# Patient Record
Sex: FEMALE | Race: WHITE | NOT HISPANIC OR LATINO | Employment: UNEMPLOYED | URBAN - METROPOLITAN AREA
[De-identification: names, ages, dates, MRNs, and addresses within clinical notes are randomized per-mention and may not be internally consistent; named-entity substitution may affect disease eponyms.]

---

## 2021-08-02 PROBLEM — U07.1 COVID-19 AFFECTING PREGNANCY IN THIRD TRIMESTER: Status: ACTIVE | Noted: 2021-08-02

## 2021-08-02 PROBLEM — O98.513 COVID-19 AFFECTING PREGNANCY IN THIRD TRIMESTER: Status: ACTIVE | Noted: 2021-08-02

## 2021-08-18 PROBLEM — O16.3 HTN COMPLICATING PERIPREGNANCY, ANTEPARTUM, THIRD TRIMESTER: Status: ACTIVE | Noted: 2021-08-18

## 2021-08-20 PROBLEM — O22.10: Status: ACTIVE | Noted: 2021-08-20

## 2021-08-20 PROBLEM — Z87.19 HX OF CROHN'S DISEASE: Status: ACTIVE | Noted: 2021-08-20

## 2021-09-08 ENCOUNTER — PATIENT MESSAGE (OUTPATIENT)
Dept: ADMINISTRATIVE | Facility: OTHER | Age: 21
End: 2021-09-08

## 2022-08-19 ENCOUNTER — TELEPHONE (OUTPATIENT)
Dept: GASTROENTEROLOGY | Facility: CLINIC | Age: 22
End: 2022-08-19
Payer: COMMERCIAL

## 2022-08-22 ENCOUNTER — PATIENT MESSAGE (OUTPATIENT)
Dept: GASTROENTEROLOGY | Facility: CLINIC | Age: 22
End: 2022-08-22
Payer: COMMERCIAL

## 2022-09-01 ENCOUNTER — LAB VISIT (OUTPATIENT)
Dept: LAB | Facility: HOSPITAL | Age: 22
End: 2022-09-01
Attending: INTERNAL MEDICINE
Payer: COMMERCIAL

## 2022-09-01 ENCOUNTER — OFFICE VISIT (OUTPATIENT)
Dept: GASTROENTEROLOGY | Facility: CLINIC | Age: 22
End: 2022-09-01
Payer: COMMERCIAL

## 2022-09-01 VITALS — HEIGHT: 67 IN | BODY MASS INDEX: 31.63 KG/M2 | WEIGHT: 201.5 LBS

## 2022-09-01 DIAGNOSIS — K50.818 CROHN'S DISEASE OF BOTH SMALL AND LARGE INTESTINE WITH OTHER COMPLICATION: Primary | ICD-10-CM

## 2022-09-01 DIAGNOSIS — K50.818 CROHN'S DISEASE OF BOTH SMALL AND LARGE INTESTINE WITH OTHER COMPLICATION: ICD-10-CM

## 2022-09-01 PROCEDURE — 86480 TB TEST CELL IMMUN MEASURE: CPT | Performed by: INTERNAL MEDICINE

## 2022-09-01 PROCEDURE — 99205 PR OFFICE/OUTPT VISIT, NEW, LEVL V, 60-74 MIN: ICD-10-PCS | Mod: S$GLB,,, | Performed by: INTERNAL MEDICINE

## 2022-09-01 PROCEDURE — 99205 OFFICE O/P NEW HI 60 MIN: CPT | Mod: S$GLB,,, | Performed by: INTERNAL MEDICINE

## 2022-09-01 PROCEDURE — 3008F PR BODY MASS INDEX (BMI) DOCUMENTED: ICD-10-PCS | Mod: CPTII,S$GLB,, | Performed by: INTERNAL MEDICINE

## 2022-09-01 PROCEDURE — 3008F BODY MASS INDEX DOCD: CPT | Mod: CPTII,S$GLB,, | Performed by: INTERNAL MEDICINE

## 2022-09-01 PROCEDURE — 99999 PR PBB SHADOW E&M-EST. PATIENT-LVL II: CPT | Mod: PBBFAC,,, | Performed by: INTERNAL MEDICINE

## 2022-09-01 PROCEDURE — 1159F PR MEDICATION LIST DOCUMENTED IN MEDICAL RECORD: ICD-10-PCS | Mod: CPTII,S$GLB,, | Performed by: INTERNAL MEDICINE

## 2022-09-01 PROCEDURE — 1159F MED LIST DOCD IN RCRD: CPT | Mod: CPTII,S$GLB,, | Performed by: INTERNAL MEDICINE

## 2022-09-01 PROCEDURE — 99999 PR PBB SHADOW E&M-EST. PATIENT-LVL II: ICD-10-PCS | Mod: PBBFAC,,, | Performed by: INTERNAL MEDICINE

## 2022-09-01 RX ORDER — USTEKINUMAB 90 MG/ML
90 INJECTION, SOLUTION SUBCUTANEOUS
Qty: 1 ML | Refills: 12 | Status: SHIPPED | OUTPATIENT
Start: 2022-09-01 | End: 2022-09-19 | Stop reason: SDUPTHER

## 2022-09-01 RX ORDER — SERTRALINE HYDROCHLORIDE 25 MG/1
25 TABLET, FILM COATED ORAL DAILY
COMMUNITY
Start: 2022-08-30 | End: 2023-03-23 | Stop reason: ALTCHOICE

## 2022-09-01 RX ORDER — PANTOPRAZOLE SODIUM 40 MG/1
40 TABLET, DELAYED RELEASE ORAL DAILY
Qty: 90 TABLET | Refills: 1 | Status: SHIPPED | OUTPATIENT
Start: 2022-09-01 | End: 2023-03-02

## 2022-09-01 RX ORDER — NORETHINDRONE ACETATE AND ETHINYL ESTRADIOL, AND FERROUS FUMARATE 1MG-20(24)
1 KIT ORAL DAILY PRN
COMMUNITY
Start: 2022-07-08 | End: 2023-03-23

## 2022-09-01 RX ORDER — USTEKINUMAB 90 MG/ML
INJECTION, SOLUTION SUBCUTANEOUS
COMMUNITY
Start: 2022-08-08 | End: 2022-09-01 | Stop reason: SDUPTHER

## 2022-09-01 RX ORDER — OMEPRAZOLE 20 MG/1
20 CAPSULE, DELAYED RELEASE ORAL
COMMUNITY
End: 2023-03-23

## 2022-09-01 RX ORDER — DICYCLOMINE HYDROCHLORIDE 10 MG/1
10 CAPSULE ORAL EVERY 6 HOURS PRN
COMMUNITY
Start: 2022-08-10 | End: 2024-03-28

## 2022-09-01 RX ORDER — ERGOCALCIFEROL (VITAMIN D2) 10 MCG
5000 TABLET ORAL
COMMUNITY
End: 2024-01-09

## 2022-09-01 RX ORDER — ERGOCALCIFEROL 1.25 MG/1
50000 CAPSULE ORAL
Qty: 12 CAPSULE | Refills: 3 | Status: SHIPPED | OUTPATIENT
Start: 2022-09-01 | End: 2022-11-30

## 2022-09-01 RX ORDER — CYANOCOBALAMIN (VITAMIN B-12) 2000 MCG
1 TABLET ORAL
COMMUNITY
Start: 2022-04-22 | End: 2023-04-22

## 2022-09-01 NOTE — PROGRESS NOTES
Chief Complaint: Crohn's disease     HPI: 22 y.o. female h/o upper GI (gastric and duodenal) and ileocolonic Crohn's disease, diagnosed 2013, complicated by ileal and ascending colon stricture, status post ileal resection and right hemicolectomy (May 2020) currently on Stelara 90 mg every 4 week monotherapy who presents as a referral from [facility] to establish care.    Harper Spivey has overall been doing very well from a GI perspective on her current regimen. She typically has regular bowel movements without any mucous in stool or blood in stool. She intermittently has episodes where she has abdominal cramping with diarrhea, nausea and vomiting. This is usually triggered by stress and has been considered to be related to irritable bowel syndrome. The next day symptoms improve. She last had endoscopy     Denies any active extraintestinal manifestations involving eyes, mouth, skin, joints, and perianal area.    She has been adherent with her Stelara 90 mg every 4 weeks     Medical records reviewed. Additional history supplemented by nursing.     Review of Systems:   General ROS: negative for chills, fever, or weight loss  Ophthalmic ROS: negative for blurry vision, photophobia, or eye pain  ENT ROS: negative for oral ulcers, sore throat or rhinorrhea  Respiratory ROS: no cough, shortness of breath, or wheezing  Cardiovascular ROS: no chest pain, palpitations, or dyspnea on exertion  Gastrointestinal ROS: per HPI  Musculoskeletal ROS: no arthralgias, myalgias, joint swelling or erythema  Dermatological ROS: negative for pruritis, rash, ulcers, nodules    Past Medical History:  Crohn's disease-Upper GI (duodenal and gastric Crohn's disease) and ileocolonic Crohn's disease     Past Surgical History: ileal resection and right hemicolectomy (May 2020)     Social History:  Tobacco use: none  NSAIDS use: none  Narcotic use: none  Alcohol use: none  Illicit drug use: none    Family  History:  "  There is no known family history of IBD, CRC, or celiac disease.     Allergies:   No known drug allergies.    Medications: reviewed    Physical Examination:  Ht 5' 7" (1.702 m)   Wt 91.4 kg (201 lb 8 oz)   LMP 08/29/2022   BMI 31.56 kg/m²   General appearance: alert, cooperative, no distress  HENT: Normocephalic, atraumatic, neck symmetrical  Eyes: conjunctivae clear  Lungs: No labored breathing  Skin: No jaundice  Neurologic: Alert and oriented X 3      Most recent Labs/Stool studies:  Lab Results   Component Value Date    WBC 12.96 (H) 08/19/2021    HGB 10.9 (L) 08/19/2021     Lab Results   Component Value Date    CREATININE 0.43 (L) 08/19/2021     Lab Results   Component Value Date    ALT 17 08/19/2021    AST 38 (H) 08/19/2021    ALKPHOS 116 08/19/2021    BILITOT 0.2 08/19/2021     Most recent Endoscopy:   EGD 10/2021 with LA Grade A esophagitis    Colonoscopy:10/2021: Large skin tags on perianal exam, evidence of prior side to side ileocolonic anastomosis in TC which was patent, two small ulcers on anastomotic lines, traversed. Teodoro-TI appeared normal Rutgeerts i0    Assessment and Plan: 22 y.o. female with  1. Upper GI (duodenal and gastric Crohn's disease) and ileocolonic Crohn's disease   2. Complicated by ileal and ascending colon stricture, status post ileal and right hemicolectomy (5/2020)  3. Currently on Stelara 90 mg every 4 weeks monotherapy (dose optimized following elevated fecal calprotectin and CRP)   4. Previously on Remicade and Humira (secondary loss of response)  5. Mucosal healing achieved on Stelara 90 mg every 4 weeks (10/2021)  6. Up to date with Prevnar 13, Pneumovax 23  7. Hepatitis B immune  8. Osteopenia (DEXA 2021)    Harper is doing well from a GI perspective while on current regimen and should continue Stelara 90 mg every 4 weeks.     I have reviewed records from [facility] and will scan in to chart the procedure notes from [facility] as well as [facility].    Will place " order to try to obtain authorization for dosing of Stelara. May require an additional IV dose if able to be approved by insurance given that the fecal calprotectin has been slowly increasing and she is symptomatic. Fcal remains under 200 (181). Will repeat again in 3 months. I discussed that her most recent colonoscopy showed mucosal healing in 10/2021. If Fcal continues to rise we will consider repeat colonoscopy.    Osteopenia noted on last DEXA scan. She will continue calcium and vitamin D supplementation. Started her on high dose vitamin D 50,000 weekly.     Prescribed protonix 40 mg daily due to symptoms of reflux.     She is up to date with most vaccinations except Shingrix. Will update hepatitis and TB serology.    Healthcare Maintenance:  -COVID: refused  Prevnar 13: 2/26/2020  -Pneumovax 23: 8/4/2020  -Flu Shot: 10/25/2020  -Shingrix: recommend  -Tdap: UTD  -Hepatitis B: UTD  -Cervical cancer screening: follows with Dr. Lundberg  -Skin cancer screening: recommend  -Colon cancer screening: dx 2013, last colonoscopy 2021  -TB/Hep B screening: UTD  -Bone density: DEXA w/ osteopenia  -Tobacco: avoids   -Should avoid NSAIDs and narcotics, use only Tylenol for pain relief.     RTC 6 months     60 minutes of total time spent on the encounter, which includes face to face time and non-face to face time preparing to see the patient (eg, review of tests), obtaining and/or reviewing separately obtained history, documenting clinical information in the electronic or other health record, Independently interpreting results (not separately reported) and communicating results to the patient/family/caregiver, or care coordination (not separately reported).            John Conklin MD  [facility]  [address] [place] [address]  Office: [phone]  Fax: [phone]

## 2022-09-03 LAB
GAMMA INTERFERON BACKGROUND BLD IA-ACNC: 0.08 IU/ML
M TB IFN-G CD4+ BCKGRND COR BLD-ACNC: 0 IU/ML
MITOGEN IGNF BCKGRD COR BLD-ACNC: 9.43 IU/ML
TB GOLD PLUS: NEGATIVE
TB2 - NIL: 0.02 IU/ML

## 2022-09-08 ENCOUNTER — SPECIALTY PHARMACY (OUTPATIENT)
Dept: PHARMACY | Facility: CLINIC | Age: 22
End: 2022-09-08
Payer: COMMERCIAL

## 2022-09-08 ENCOUNTER — PATIENT MESSAGE (OUTPATIENT)
Dept: GASTROENTEROLOGY | Facility: CLINIC | Age: 22
End: 2022-09-08
Payer: COMMERCIAL

## 2022-09-08 NOTE — TELEPHONE ENCOUNTER
Incoming call from pt regarding status of Stelara.  Welcome call completed. Explained [facility] services.  Pt states she is due to inject on 9/22.  Will route to team.    Beulah, this is Shannan Salmon with [facility].  We are working on your prescription that your doctor has sent us. We will be working with your insurance to get this approved for you. We will be calling you along the way with updates on your medication.  If you have any questions, you can reach us at [phone].    Welcome call outcome: Patient/caregiver reached

## 2022-09-09 ENCOUNTER — SPECIALTY PHARMACY (OUTPATIENT)
Dept: PHARMACY | Facility: CLINIC | Age: 22
End: 2022-09-09
Payer: COMMERCIAL

## 2022-09-09 ENCOUNTER — PATIENT MESSAGE (OUTPATIENT)
Dept: GASTROENTEROLOGY | Facility: CLINIC | Age: 22
End: 2022-09-09
Payer: COMMERCIAL

## 2022-09-09 DIAGNOSIS — Z87.19 HX OF CROHN'S DISEASE: Primary | ICD-10-CM

## 2022-09-09 NOTE — TELEPHONE ENCOUNTER
Spoke with patient, patient has been on every 4 week dosing for about a year. She got new insurance and therefore needs a new PA. Will finalize PA once chart notes are signed.

## 2022-09-09 NOTE — TELEPHONE ENCOUNTER
Elli Riojas, this is Heidy Lundberg with [facility].  We are working on your prescription that your doctor has sent us. We will be working with your insurance to get this approved for you. We will be calling you along the way with updates on your medication.  If you have any questions, you can reach us at [phone].    Welcome call outcome: Patient/caregiver reached      Spoke with patient, patient has been on every 4 week dosing for about a year. She got new insurance and therefore needs a new PA. Will finalize PA once chart notes are signed.

## 2022-09-10 PROBLEM — K50.818 CROHN'S DISEASE OF BOTH SMALL AND LARGE INTESTINE WITH OTHER COMPLICATION: Status: ACTIVE | Noted: 2022-09-10

## 2022-09-10 RX ORDER — EPINEPHRINE 1 MG/ML
0.3 INJECTION, SOLUTION, CONCENTRATE INTRAVENOUS
Status: CANCELLED | OUTPATIENT
Start: 2022-09-10

## 2022-09-10 RX ORDER — IPRATROPIUM BROMIDE AND ALBUTEROL SULFATE 2.5; .5 MG/3ML; MG/3ML
3 SOLUTION RESPIRATORY (INHALATION)
Status: CANCELLED | OUTPATIENT
Start: 2022-09-10

## 2022-09-10 RX ORDER — HEPARIN 100 UNIT/ML
500 SYRINGE INTRAVENOUS
Status: CANCELLED | OUTPATIENT
Start: 2022-09-10

## 2022-09-10 RX ORDER — DIPHENHYDRAMINE HYDROCHLORIDE 50 MG/ML
25 INJECTION INTRAMUSCULAR; INTRAVENOUS
Status: CANCELLED | OUTPATIENT
Start: 2022-09-10

## 2022-09-10 RX ORDER — ACETAMINOPHEN 325 MG/1
650 TABLET ORAL
Status: CANCELLED | OUTPATIENT
Start: 2022-09-10

## 2022-09-10 RX ORDER — SODIUM CHLORIDE 0.9 % (FLUSH) 0.9 %
10 SYRINGE (ML) INJECTION
Status: CANCELLED | OUTPATIENT
Start: 2022-09-10

## 2022-09-10 RX ORDER — METHYLPREDNISOLONE SOD SUCC 125 MG
40 VIAL (EA) INJECTION
Status: CANCELLED | OUTPATIENT
Start: 2022-09-10

## 2022-09-15 NOTE — TELEPHONE ENCOUNTER
Called to check status on the appeal, it is in expedited status with a 72 hour turn around, therefore a dcision should be made by end of the day Friday 9/16/22

## 2022-09-19 DIAGNOSIS — K50.818 CROHN'S DISEASE OF BOTH SMALL AND LARGE INTESTINE WITH OTHER COMPLICATION: Primary | ICD-10-CM

## 2022-09-19 RX ORDER — USTEKINUMAB 90 MG/ML
90 INJECTION, SOLUTION SUBCUTANEOUS
Qty: 1 ML | Refills: 12 | Status: SHIPPED | OUTPATIENT
Start: 2022-09-19 | End: 2022-09-20 | Stop reason: DRUGHIGH

## 2022-09-19 NOTE — TELEPHONE ENCOUNTER
1st level appeal denied, sent fax for 2nd level appeal, asked for urgency, can take up to 3 to 5 business days. As of now, insurance will pay for every 6 week dosing.    Provider sent a script for every 6 week dosing to [facility] so patient will not be late on her dose and gives [facility] more time to work on appeal. Patient aware.

## 2022-09-20 NOTE — TELEPHONE ENCOUNTER
-PA approved from 09/19/2022 to 09/19/2023  For every 4 week dosing     [facility] O[facility]. Patient must fill at [facility]     -Patient informed via telephone ([phone])  -Will transfer Rx and close out at [facility].

## 2022-09-23 ENCOUNTER — PATIENT MESSAGE (OUTPATIENT)
Dept: GASTROENTEROLOGY | Facility: CLINIC | Age: 22
End: 2022-09-23
Payer: COMMERCIAL

## 2022-09-23 ENCOUNTER — TELEPHONE (OUTPATIENT)
Dept: GASTROENTEROLOGY | Facility: CLINIC | Age: 22
End: 2022-09-23
Payer: COMMERCIAL

## 2022-09-23 NOTE — TELEPHONE ENCOUNTER
Patient states that there is an issue with her stelara rx that was sent to [facility] pharmacy. She says that they told her they have the rx, but it is not signed

## 2022-09-23 NOTE — TELEPHONE ENCOUNTER
----- Message from Jessica Monterroso sent at 9/23/2022  3:09 PM CDT -----  Contact: pt at [phone]  Type: Needs Medical Advice  Who Called:  pt   Best Call Back Number: [phone]    Additional Information: pt called in saying she has an important question to ask Nurse Chelly please call back to advise pt she says she has been calling for a while

## 2022-09-23 NOTE — TELEPHONE ENCOUNTER
----- Message from Arleen Monk sent at 9/23/2022  2:34 PM CDT -----  Contact: pt  Type: Needs Medical Advice  Who Called:  pt   Best Call Back Number: [phone]    Additional Information: calling the office to speak to the team.. please call and adv-

## 2022-09-27 ENCOUNTER — TELEPHONE (OUTPATIENT)
Dept: GASTROENTEROLOGY | Facility: CLINIC | Age: 22
End: 2022-09-27
Payer: COMMERCIAL

## 2022-09-27 ENCOUNTER — PATIENT MESSAGE (OUTPATIENT)
Dept: GASTROENTEROLOGY | Facility: CLINIC | Age: 22
End: 2022-09-27
Payer: COMMERCIAL

## 2022-09-27 NOTE — TELEPHONE ENCOUNTER
----- Message from Jessy Baltazar sent at 9/27/2022 11:37 AM CDT -----   Pt states she doesn't need the infusion she getting injection. She doesn't know why the Stelara was ordered.

## 2022-09-28 NOTE — TELEPHONE ENCOUNTER
John Conklin MD  You 14 hours ago (6:50 PM)     Because she is having symptoms and mildly elevated fecal calprotectin. It is documented in my note that I am going to try to get an additional IV dose for her to have to see if her fecal calprotectin improves.

## 2022-10-03 ENCOUNTER — PATIENT MESSAGE (OUTPATIENT)
Dept: GASTROENTEROLOGY | Facility: CLINIC | Age: 22
End: 2022-10-03
Payer: COMMERCIAL

## 2022-10-04 ENCOUNTER — PATIENT MESSAGE (OUTPATIENT)
Dept: GASTROENTEROLOGY | Facility: CLINIC | Age: 22
End: 2022-10-04
Payer: COMMERCIAL

## 2022-10-07 ENCOUNTER — INFUSION (OUTPATIENT)
Dept: INFUSION THERAPY | Facility: HOSPITAL | Age: 22
End: 2022-10-07
Attending: INTERNAL MEDICINE
Payer: COMMERCIAL

## 2022-10-07 VITALS
BODY MASS INDEX: 31.18 KG/M2 | SYSTOLIC BLOOD PRESSURE: 122 MMHG | DIASTOLIC BLOOD PRESSURE: 70 MMHG | RESPIRATION RATE: 18 BRPM | HEART RATE: 78 BPM | TEMPERATURE: 98 F | HEIGHT: 67 IN | WEIGHT: 198.63 LBS

## 2022-10-07 DIAGNOSIS — K50.818 CROHN'S DISEASE OF BOTH SMALL AND LARGE INTESTINE WITH OTHER COMPLICATION: Primary | ICD-10-CM

## 2022-10-07 PROCEDURE — 96365 THER/PROPH/DIAG IV INF INIT: CPT | Mod: PN

## 2022-10-07 PROCEDURE — 63600175 PHARM REV CODE 636 W HCPCS: Mod: JG,PN | Performed by: INTERNAL MEDICINE

## 2022-10-07 PROCEDURE — 25000003 PHARM REV CODE 250: Mod: PN | Performed by: INTERNAL MEDICINE

## 2022-10-07 RX ORDER — IPRATROPIUM BROMIDE AND ALBUTEROL SULFATE 2.5; .5 MG/3ML; MG/3ML
3 SOLUTION RESPIRATORY (INHALATION)
Status: CANCELLED | OUTPATIENT
Start: 2022-10-07

## 2022-10-07 RX ORDER — SODIUM CHLORIDE 0.9 % (FLUSH) 0.9 %
10 SYRINGE (ML) INJECTION
Status: DISCONTINUED | OUTPATIENT
Start: 2022-10-07 | End: 2022-10-07 | Stop reason: HOSPADM

## 2022-10-07 RX ORDER — SODIUM CHLORIDE 0.9 % (FLUSH) 0.9 %
10 SYRINGE (ML) INJECTION
Status: CANCELLED | OUTPATIENT
Start: 2022-10-07

## 2022-10-07 RX ORDER — HEPARIN 100 UNIT/ML
500 SYRINGE INTRAVENOUS
Status: CANCELLED | OUTPATIENT
Start: 2022-10-07

## 2022-10-07 RX ORDER — HEPARIN 100 UNIT/ML
500 SYRINGE INTRAVENOUS
Status: DISCONTINUED | OUTPATIENT
Start: 2022-10-07 | End: 2022-10-07 | Stop reason: HOSPADM

## 2022-10-07 RX ORDER — ACETAMINOPHEN 325 MG/1
650 TABLET ORAL
Status: CANCELLED | OUTPATIENT
Start: 2022-10-07

## 2022-10-07 RX ORDER — EPINEPHRINE 1 MG/ML
0.3 INJECTION, SOLUTION, CONCENTRATE INTRAVENOUS
Status: CANCELLED | OUTPATIENT
Start: 2022-10-07

## 2022-10-07 RX ORDER — METHYLPREDNISOLONE SOD SUCC 125 MG
40 VIAL (EA) INJECTION
Status: CANCELLED | OUTPATIENT
Start: 2022-10-07

## 2022-10-07 RX ORDER — DIPHENHYDRAMINE HYDROCHLORIDE 50 MG/ML
25 INJECTION INTRAMUSCULAR; INTRAVENOUS
Status: CANCELLED | OUTPATIENT
Start: 2022-10-07

## 2022-10-07 RX ADMIN — USTEKINUMAB 520 MG: 130 SOLUTION INTRAVENOUS at 03:10

## 2022-10-07 RX ADMIN — SODIUM CHLORIDE: 0.9 INJECTION, SOLUTION INTRAVENOUS at 02:10

## 2022-10-07 NOTE — PLAN OF CARE
Tolerated stelara well.  No reactions noted.  No questions or concerns at this time.  Ambulated off unit in NAD.

## 2023-01-05 ENCOUNTER — PATIENT MESSAGE (OUTPATIENT)
Dept: GASTROENTEROLOGY | Facility: CLINIC | Age: 23
End: 2023-01-05
Payer: COMMERCIAL

## 2023-01-06 ENCOUNTER — PATIENT MESSAGE (OUTPATIENT)
Dept: GASTROENTEROLOGY | Facility: CLINIC | Age: 23
End: 2023-01-06
Payer: COMMERCIAL

## 2023-03-23 ENCOUNTER — OFFICE VISIT (OUTPATIENT)
Dept: GASTROENTEROLOGY | Facility: CLINIC | Age: 23
End: 2023-03-23
Payer: COMMERCIAL

## 2023-03-23 ENCOUNTER — LAB VISIT (OUTPATIENT)
Dept: LAB | Facility: HOSPITAL | Age: 23
End: 2023-03-23
Attending: INTERNAL MEDICINE
Payer: COMMERCIAL

## 2023-03-23 VITALS — HEIGHT: 67 IN | WEIGHT: 192.44 LBS | BODY MASS INDEX: 30.2 KG/M2

## 2023-03-23 DIAGNOSIS — K50.818 CROHN'S DISEASE OF BOTH SMALL AND LARGE INTESTINE WITH OTHER COMPLICATION: Primary | ICD-10-CM

## 2023-03-23 DIAGNOSIS — K50.818 CROHN'S DISEASE OF BOTH SMALL AND LARGE INTESTINE WITH OTHER COMPLICATION: ICD-10-CM

## 2023-03-23 LAB
ALBUMIN SERPL BCP-MCNC: 3.8 G/DL (ref 3.5–5.2)
ALP SERPL-CCNC: 78 U/L (ref 55–135)
ALT SERPL W/O P-5'-P-CCNC: 22 U/L (ref 10–44)
ANION GAP SERPL CALC-SCNC: 11 MMOL/L (ref 8–16)
AST SERPL-CCNC: 17 U/L (ref 10–40)
BASOPHILS # BLD AUTO: 0.06 K/UL (ref 0–0.2)
BASOPHILS NFR BLD: 0.4 % (ref 0–1.9)
BILIRUB SERPL-MCNC: 0.3 MG/DL (ref 0.1–1)
BUN SERPL-MCNC: 7 MG/DL (ref 6–20)
CALCIUM SERPL-MCNC: 10 MG/DL (ref 8.7–10.5)
CHLORIDE SERPL-SCNC: 104 MMOL/L (ref 95–110)
CO2 SERPL-SCNC: 23 MMOL/L (ref 23–29)
CREAT SERPL-MCNC: 0.7 MG/DL (ref 0.5–1.4)
CRP SERPL-MCNC: 21.1 MG/L (ref 0–8.2)
DIFFERENTIAL METHOD: ABNORMAL
EOSINOPHIL # BLD AUTO: 0.1 K/UL (ref 0–0.5)
EOSINOPHIL NFR BLD: 1 % (ref 0–8)
ERYTHROCYTE [DISTWIDTH] IN BLOOD BY AUTOMATED COUNT: 15 % (ref 11.5–14.5)
EST. GFR  (NO RACE VARIABLE): >60 ML/MIN/1.73 M^2
FERRITIN SERPL-MCNC: 36 NG/ML (ref 20–300)
GLUCOSE SERPL-MCNC: 76 MG/DL (ref 70–110)
HCT VFR BLD AUTO: 38.7 % (ref 37–48.5)
HGB BLD-MCNC: 12.4 G/DL (ref 12–16)
IMM GRANULOCYTES # BLD AUTO: 0.05 K/UL (ref 0–0.04)
IMM GRANULOCYTES NFR BLD AUTO: 0.4 % (ref 0–0.5)
LYMPHOCYTES # BLD AUTO: 3.1 K/UL (ref 1–4.8)
LYMPHOCYTES NFR BLD: 22.6 % (ref 18–48)
MCH RBC QN AUTO: 27.1 PG (ref 27–31)
MCHC RBC AUTO-ENTMCNC: 32 G/DL (ref 32–36)
MCV RBC AUTO: 85 FL (ref 82–98)
MONOCYTES # BLD AUTO: 1.1 K/UL (ref 0.3–1)
MONOCYTES NFR BLD: 7.9 % (ref 4–15)
NEUTROPHILS # BLD AUTO: 9.3 K/UL (ref 1.8–7.7)
NEUTROPHILS NFR BLD: 67.7 % (ref 38–73)
NRBC BLD-RTO: 0 /100 WBC
PLATELET # BLD AUTO: 482 K/UL (ref 150–450)
PMV BLD AUTO: 10.3 FL (ref 9.2–12.9)
POTASSIUM SERPL-SCNC: 4.1 MMOL/L (ref 3.5–5.1)
PROT SERPL-MCNC: 8 G/DL (ref 6–8.4)
RBC # BLD AUTO: 4.57 M/UL (ref 4–5.4)
SODIUM SERPL-SCNC: 138 MMOL/L (ref 136–145)
WBC # BLD AUTO: 13.76 K/UL (ref 3.9–12.7)

## 2023-03-23 PROCEDURE — 99215 PR OFFICE/OUTPT VISIT, EST, LEVL V, 40-54 MIN: ICD-10-PCS | Mod: S$GLB,,, | Performed by: INTERNAL MEDICINE

## 2023-03-23 PROCEDURE — 82728 ASSAY OF FERRITIN: CPT | Performed by: INTERNAL MEDICINE

## 2023-03-23 PROCEDURE — 83993 ASSAY FOR CALPROTECTIN FECAL: CPT | Performed by: INTERNAL MEDICINE

## 2023-03-23 PROCEDURE — 80053 COMPREHEN METABOLIC PANEL: CPT | Performed by: INTERNAL MEDICINE

## 2023-03-23 PROCEDURE — 3008F PR BODY MASS INDEX (BMI) DOCUMENTED: ICD-10-PCS | Mod: CPTII,S$GLB,, | Performed by: INTERNAL MEDICINE

## 2023-03-23 PROCEDURE — 3008F BODY MASS INDEX DOCD: CPT | Mod: CPTII,S$GLB,, | Performed by: INTERNAL MEDICINE

## 2023-03-23 PROCEDURE — 1159F MED LIST DOCD IN RCRD: CPT | Mod: CPTII,S$GLB,, | Performed by: INTERNAL MEDICINE

## 2023-03-23 PROCEDURE — 82607 VITAMIN B-12: CPT | Performed by: INTERNAL MEDICINE

## 2023-03-23 PROCEDURE — 99999 PR PBB SHADOW E&M-EST. PATIENT-LVL III: CPT | Mod: PBBFAC,,, | Performed by: INTERNAL MEDICINE

## 2023-03-23 PROCEDURE — 1159F PR MEDICATION LIST DOCUMENTED IN MEDICAL RECORD: ICD-10-PCS | Mod: CPTII,S$GLB,, | Performed by: INTERNAL MEDICINE

## 2023-03-23 PROCEDURE — 99999 PR PBB SHADOW E&M-EST. PATIENT-LVL III: ICD-10-PCS | Mod: PBBFAC,,, | Performed by: INTERNAL MEDICINE

## 2023-03-23 PROCEDURE — 36415 COLL VENOUS BLD VENIPUNCTURE: CPT | Mod: PO | Performed by: INTERNAL MEDICINE

## 2023-03-23 PROCEDURE — 82306 VITAMIN D 25 HYDROXY: CPT | Performed by: INTERNAL MEDICINE

## 2023-03-23 PROCEDURE — 85025 COMPLETE CBC W/AUTO DIFF WBC: CPT | Performed by: INTERNAL MEDICINE

## 2023-03-23 PROCEDURE — 99215 OFFICE O/P EST HI 40 MIN: CPT | Mod: S$GLB,,, | Performed by: INTERNAL MEDICINE

## 2023-03-23 PROCEDURE — 86140 C-REACTIVE PROTEIN: CPT | Performed by: INTERNAL MEDICINE

## 2023-03-23 RX ORDER — ESCITALOPRAM OXALATE 20 MG/1
20 TABLET ORAL DAILY
COMMUNITY
End: 2024-01-09

## 2023-03-23 RX ORDER — ONDANSETRON 4 MG/1
4 TABLET, FILM COATED ORAL EVERY 6 HOURS PRN
Qty: 10 TABLET | Refills: 1 | Status: SHIPPED | OUTPATIENT
Start: 2023-03-23 | End: 2023-07-24 | Stop reason: SDUPTHER

## 2023-03-23 RX ORDER — METFORMIN HYDROCHLORIDE 500 MG/1
500 TABLET ORAL 2 TIMES DAILY WITH MEALS
COMMUNITY
End: 2024-01-09

## 2023-03-23 NOTE — PROGRESS NOTES
"Chief Complaint: Crohn's disease     HPI: 22 y.o. female h/o upper GI (gastric and duodenal) and ileocolonic Crohn's disease, diagnosed 2013, complicated by ileal and ascending colon stricture, status post ileal resection and right hemicolectomy (May 2020) currently on Stelara 90 mg every 4 week monotherapy here for follow up.    Reports that she usually has regular bowel movements without any mucous or blood in stool. She overall states she has been doing well. Denies any active extraintestinal manifestations involving eyes, mouth, skin, joints, and perianal area.    She has been adherent with her Stelara 90 mg every 4 weeks. Received one additional IV dose Stelara due to increased symptoms and increased fecal calprotectin.      IBD History:   IBD disease: Crohn's disease  Disease location: Upper GI (duodenal and gastric Crohn's disease) and ileocolonic Crohn's disease   Disease behavior: Inflammatory  Current therapy:  Stelara 90 mg every 4 weeks  Prior therapy: infliximab, adalimumab, budesonide, mercaptopurine, steroids  Surgeries: ileal resection and right hemicolectomy (May 2020)   Complications:none  Extraintestinal manifestations: none    Allergies and medications reviewed    Review of Systems:   General ROS: negative for chills, fever, or weight loss  Ophthalmic ROS: negative for blurry vision, photophobia, or eye pain  ENT ROS: negative for oral ulcers, sore throat or rhinorrhea  Respiratory ROS: no cough, shortness of breath, or wheezing  Cardiovascular ROS: no chest pain, palpitations, or dyspnea on exertion  Gastrointestinal ROS: per HPI  Musculoskeletal ROS: no arthralgias, myalgias, joint swelling or erythema  Dermatological ROS: negative for pruritis, rash, ulcers, nodules      Physical Examination:  Ht 5' 7" (1.702 m)   Wt 87.3 kg (192 lb 7.4 oz)   LMP 02/24/2023   BMI 30.14 kg/m²   General appearance: alert, cooperative, no distress  HENT: Normocephalic, atraumatic, neck symmetrical  Eyes: " conjunctivae clear  Lungs: No labored breathing  Skin: No jaundice  Neurologic: Alert and oriented X 3      Most recent Labs/Stool studies:  Lab Results   Component Value Date    WBC 13.76 (H) 03/23/2023    HGB 12.4 03/23/2023    HCT 38.7 03/23/2023    MCV 85 03/23/2023     (H) 03/23/2023       CMP  Sodium   Date Value Ref Range Status   03/23/2023 138 136 - 145 mmol/L Final     Potassium   Date Value Ref Range Status   03/23/2023 4.1 3.5 - 5.1 mmol/L Final     Chloride   Date Value Ref Range Status   03/23/2023 104 95 - 110 mmol/L Final     CO2   Date Value Ref Range Status   03/23/2023 23 23 - 29 mmol/L Final     Glucose   Date Value Ref Range Status   03/23/2023 76 70 - 110 mg/dL Final     BUN   Date Value Ref Range Status   03/23/2023 7 6 - 20 mg/dL Final     Creatinine   Date Value Ref Range Status   03/23/2023 0.7 0.5 - 1.4 mg/dL Final     Calcium   Date Value Ref Range Status   03/23/2023 10.0 8.7 - 10.5 mg/dL Final     Total Protein   Date Value Ref Range Status   03/23/2023 8.0 6.0 - 8.4 g/dL Final     Albumin   Date Value Ref Range Status   03/23/2023 3.8 3.5 - 5.2 g/dL Final     Total Bilirubin   Date Value Ref Range Status   03/23/2023 0.3 0.1 - 1.0 mg/dL Final     Comment:     For infants and newborns, interpretation of results should be based  on gestational age, weight and in agreement with clinical  observations.    Premature Infant recommended reference ranges:  Up to 24 hours.............<8.0 mg/dL  Up to 48 hours............<12.0 mg/dL  3-5 days..................<15.0 mg/dL  6-29 days.................<15.0 mg/dL       Alkaline Phosphatase   Date Value Ref Range Status   03/23/2023 78 55 - 135 U/L Final     AST   Date Value Ref Range Status   03/23/2023 17 10 - 40 U/L Final     ALT   Date Value Ref Range Status   03/23/2023 22 10 - 44 U/L Final     Anion Gap   Date Value Ref Range Status   03/23/2023 11 8 - 16 mmol/L Final     eGFR   Date Value Ref Range Status   03/23/2023 >60.0 >60  mL/min/1.73 m^2 Final       Most recent Endoscopy:   EGD 10/2021 with LA Grade A esophagitis    Colonoscopy:10/2021: Large skin tags on perianal exam, evidence of prior side to side ileocolonic anastomosis in TC which was patent, two small ulcers on anastomotic lines, traversed. Teodoro-TI appeared normal Rutgeerts i0    Assessment and Plan: 22 y.o. female with    Upper GI (duodenal and gastric Crohn's disease) and ileocolonic Crohn's disease   Complicated by ileal and ascending colon stricture, status post ileal and right hemicolectomy (5/2020)  Currently on Stelara 90 mg every 4 weeks monotherapy (dose optimized following elevated fecal calprotectin and CRP)   Previously on Remicade and Humira (secondary loss of response)  5. Mucosal healing achieved on Stelara 90 mg every 4 weeks (10/2021)  Health maintenance    Harper states she has been feeling fairly well on Stelara 90 mg every 4 weeks. She did receive one additional IV dose due to increased fecal calprotectin.    Will plan to repeat fecal yuliana again as well as CRP. If elevated will plan to repeat endoscopy.    Most recent colonoscopy showed mucosal healing in 10/2021.     Will check vitamin levels and replete as necessary.    Discussed vaccinations and routine health maintenance.    Follow up: RTC 3 months to discuss results of endoscopy    Healthcare Maintenance:  -COVID: refused  Prevnar 13: 2/26/2020  -Pneumovax 23: 8/4/2020  -Flu Shot: 10/25/2020  -Shingrix: recommend  -Tdap: UTD  -Hepatitis B: UTD  -Cervical cancer screening: follows with Dr. Lundberg  -Skin cancer screening: recommend  -Colon cancer screening: dx 2013, last colonoscopy 2021  -TB/Hep B screening: UTD  -Bone density: DEXA w/ osteopenia  -Tobacco: avoids   -Should avoid NSAIDs and narcotics, use only Tylenol for pain relief.       45 minutes of total time spent on the encounter, which includes face to face time and non-face to face time preparing to see the patient (eg, review of tests),  obtaining and/or reviewing separately obtained history, documenting clinical information in the electronic or other health record, Independently interpreting results (not separately reported) and communicating results to the patient/family/caregiver, or care coordination (not separately reported).          John Conklin MD  [facility]  [address] [place] [address]  Office: [phone]  Fax: [phone]

## 2023-03-24 LAB
25(OH)D3+25(OH)D2 SERPL-MCNC: 41 NG/ML (ref 30–96)
VIT B12 SERPL-MCNC: >2000 PG/ML (ref 210–950)

## 2023-03-29 LAB — CALPROTECTIN STL-MCNT: 242.8 MCG/G

## 2023-05-12 ENCOUNTER — ANESTHESIA (OUTPATIENT)
Dept: ENDOSCOPY | Facility: HOSPITAL | Age: 23
End: 2023-05-12
Payer: COMMERCIAL

## 2023-05-12 ENCOUNTER — ANESTHESIA EVENT (OUTPATIENT)
Dept: ENDOSCOPY | Facility: HOSPITAL | Age: 23
End: 2023-05-12
Payer: COMMERCIAL

## 2023-05-12 ENCOUNTER — HOSPITAL ENCOUNTER (OUTPATIENT)
Facility: HOSPITAL | Age: 23
Discharge: HOME | End: 2023-05-12
Attending: INTERNAL MEDICINE | Admitting: INTERNAL MEDICINE
Payer: COMMERCIAL

## 2023-05-12 VITALS
BODY MASS INDEX: 29.82 KG/M2 | OXYGEN SATURATION: 100 % | RESPIRATION RATE: 20 BRPM | SYSTOLIC BLOOD PRESSURE: 122 MMHG | WEIGHT: 190 LBS | DIASTOLIC BLOOD PRESSURE: 62 MMHG | HEART RATE: 90 BPM | HEIGHT: 67 IN | TEMPERATURE: 98 F

## 2023-05-12 DIAGNOSIS — K50.818 CROHN'S DISEASE OF BOTH SMALL AND LARGE INTESTINE WITH OTHER COMPLICATION: Primary | ICD-10-CM

## 2023-05-12 DIAGNOSIS — K50.80 CROHN'S DISEASE OF BOTH SMALL AND LARGE INTESTINE: ICD-10-CM

## 2023-05-12 LAB
B-HCG UR QL: NEGATIVE
CTP QC/QA: YES

## 2023-05-12 PROCEDURE — 43239 EGD BIOPSY SINGLE/MULTIPLE: CPT | Mod: PO | Performed by: INTERNAL MEDICINE

## 2023-05-12 PROCEDURE — 43239 EGD BIOPSY SINGLE/MULTIPLE: CPT | Mod: 51,,, | Performed by: INTERNAL MEDICINE

## 2023-05-12 PROCEDURE — 37000009 HC ANESTHESIA EA ADD 15 MINS: Mod: PO | Performed by: INTERNAL MEDICINE

## 2023-05-12 PROCEDURE — D9220A PRA ANESTHESIA: ICD-10-PCS | Mod: ANES,,, | Performed by: ANESTHESIOLOGY

## 2023-05-12 PROCEDURE — 45380 COLONOSCOPY AND BIOPSY: CPT | Mod: PO | Performed by: INTERNAL MEDICINE

## 2023-05-12 PROCEDURE — 88305 TISSUE EXAM BY PATHOLOGIST: CPT | Mod: PO | Performed by: PATHOLOGY

## 2023-05-12 PROCEDURE — 88305 TISSUE EXAM BY PATHOLOGIST: ICD-10-PCS | Mod: 26,,, | Performed by: PATHOLOGY

## 2023-05-12 PROCEDURE — 63600175 PHARM REV CODE 636 W HCPCS: Mod: PO | Performed by: INTERNAL MEDICINE

## 2023-05-12 PROCEDURE — 88312 SPECIAL STAINS GROUP 1: CPT | Mod: 59,PO | Performed by: PATHOLOGY

## 2023-05-12 PROCEDURE — 88342 IMHCHEM/IMCYTCHM 1ST ANTB: CPT | Mod: 26,,, | Performed by: PATHOLOGY

## 2023-05-12 PROCEDURE — 88342 CHG IMMUNOCYTOCHEMISTRY: ICD-10-PCS | Mod: 26,,, | Performed by: PATHOLOGY

## 2023-05-12 PROCEDURE — D9220A PRA ANESTHESIA: ICD-10-PCS | Mod: CRNA,,, | Performed by: NURSE ANESTHETIST, CERTIFIED REGISTERED

## 2023-05-12 PROCEDURE — 25000003 PHARM REV CODE 250: Mod: PO | Performed by: NURSE ANESTHETIST, CERTIFIED REGISTERED

## 2023-05-12 PROCEDURE — 81025 URINE PREGNANCY TEST: CPT | Mod: PO | Performed by: INTERNAL MEDICINE

## 2023-05-12 PROCEDURE — D9220A PRA ANESTHESIA: Mod: CRNA,,, | Performed by: NURSE ANESTHETIST, CERTIFIED REGISTERED

## 2023-05-12 PROCEDURE — 27201012 HC FORCEPS, HOT/COLD, DISP: Mod: PO | Performed by: INTERNAL MEDICINE

## 2023-05-12 PROCEDURE — 88312 PR  SPECIAL STAINS,GROUP I: ICD-10-PCS | Mod: 26,,, | Performed by: PATHOLOGY

## 2023-05-12 PROCEDURE — 63600175 PHARM REV CODE 636 W HCPCS: Mod: PO | Performed by: NURSE ANESTHETIST, CERTIFIED REGISTERED

## 2023-05-12 PROCEDURE — 37000008 HC ANESTHESIA 1ST 15 MINUTES: Mod: PO | Performed by: INTERNAL MEDICINE

## 2023-05-12 PROCEDURE — 88312 SPECIAL STAINS GROUP 1: CPT | Mod: 26,,, | Performed by: PATHOLOGY

## 2023-05-12 PROCEDURE — D9220A PRA ANESTHESIA: Mod: ANES,,, | Performed by: ANESTHESIOLOGY

## 2023-05-12 PROCEDURE — 88342 IMHCHEM/IMCYTCHM 1ST ANTB: CPT | Mod: 59,PO | Performed by: PATHOLOGY

## 2023-05-12 PROCEDURE — 43239 PR EGD, FLEX, W/BIOPSY, SGL/MULTI: ICD-10-PCS | Mod: 51,,, | Performed by: INTERNAL MEDICINE

## 2023-05-12 PROCEDURE — 45380 COLONOSCOPY AND BIOPSY: CPT | Mod: ,,, | Performed by: INTERNAL MEDICINE

## 2023-05-12 PROCEDURE — 88305 TISSUE EXAM BY PATHOLOGIST: CPT | Mod: 26,,, | Performed by: PATHOLOGY

## 2023-05-12 PROCEDURE — 45380 PR COLONOSCOPY,BIOPSY: ICD-10-PCS | Mod: ,,, | Performed by: INTERNAL MEDICINE

## 2023-05-12 RX ORDER — SODIUM CHLORIDE, SODIUM LACTATE, POTASSIUM CHLORIDE, CALCIUM CHLORIDE 600; 310; 30; 20 MG/100ML; MG/100ML; MG/100ML; MG/100ML
INJECTION, SOLUTION INTRAVENOUS CONTINUOUS
Status: DISCONTINUED | OUTPATIENT
Start: 2023-05-12 | End: 2023-05-12 | Stop reason: HOSPADM

## 2023-05-12 RX ORDER — LIDOCAINE HYDROCHLORIDE 20 MG/ML
INJECTION INTRAVENOUS
Status: DISCONTINUED | OUTPATIENT
Start: 2023-05-12 | End: 2023-05-12

## 2023-05-12 RX ORDER — SODIUM CHLORIDE 0.9 % (FLUSH) 0.9 %
10 SYRINGE (ML) INJECTION EVERY 6 HOURS PRN
Status: DISCONTINUED | OUTPATIENT
Start: 2023-05-12 | End: 2023-05-12 | Stop reason: HOSPADM

## 2023-05-12 RX ORDER — PROPOFOL 10 MG/ML
VIAL (ML) INTRAVENOUS
Status: DISCONTINUED | OUTPATIENT
Start: 2023-05-12 | End: 2023-05-12

## 2023-05-12 RX ADMIN — PROPOFOL 75 MG: 10 INJECTION, EMULSION INTRAVENOUS at 10:05

## 2023-05-12 RX ADMIN — PROPOFOL 50 MG: 10 INJECTION, EMULSION INTRAVENOUS at 10:05

## 2023-05-12 RX ADMIN — LIDOCAINE HYDROCHLORIDE 100 MG: 20 INJECTION INTRAVENOUS at 10:05

## 2023-05-12 RX ADMIN — SODIUM CHLORIDE, POTASSIUM CHLORIDE, SODIUM LACTATE AND CALCIUM CHLORIDE: 600; 310; 30; 20 INJECTION, SOLUTION INTRAVENOUS at 09:05

## 2023-05-12 RX ADMIN — PROPOFOL 150 MG: 10 INJECTION, EMULSION INTRAVENOUS at 10:05

## 2023-05-12 NOTE — ANESTHESIA PREPROCEDURE EVALUATION
05/12/2023  Harper CAMPA is a 22 y.o., female.      Pre-op Assessment    I have reviewed the NPO Status.   I have reviewed the Medications.     Review of Systems  Anesthesia Hx:  No problems with previous Anesthesia    Social:  Non-Smoker    Cardiovascular:   Exercise tolerance: good Hypertension    Pulmonary:  Pulmonary Normal    Neurological:  Neurology Normal    Endocrine:  Obesity / BMI > 30      Physical Exam  General: Well nourished    Airway:  Mallampati: II   Mouth Opening: Normal  TM Distance: Normal  Tongue: Normal  Neck ROM: Normal ROM    Dental:  Intact    Chest/Lungs:  Clear to auscultation, Normal Respiratory Rate        Anesthesia Plan  Type of Anesthesia, risks & benefits discussed:    Anesthesia Type: Gen Natural Airway  Intra-op Monitoring Plan: Standard ASA Monitors  Induction:  IV  Informed Consent: Informed consent signed with the Patient and all parties understand the risks and agree with anesthesia plan.  All questions answered. Patient consented to blood products? No  ASA Score: 2    Ready For Surgery From Anesthesia Perspective.     .

## 2023-05-12 NOTE — PROVATION PATIENT INSTRUCTIONS
Discharge Summary/Instructions after an Endoscopic Procedure  Patient Name: Harper Mallory  Patient MRN: [id number]  Patient YOB: 2000  Friday, May 12, 2023  John Conklin MD  Dear patient,  As a result of recent federal legislation (The Federal Cures Act), you may   receive lab or pathology results from your procedure in your [facility]   account before your physician is able to contact you. Your physician or   their representative will relay the results to you with their   recommendations at their soonest availability.  Thank you,  RESTRICTIONS:  During your procedure today, you received medications for sedation.  These   medications may affect your judgment, balance and coordination.  Therefore,   for 24 hours, you have the following restrictions:   - DO NOT drive a car, operate machinery, make legal/financial decisions,   sign important papers or drink alcohol.    ACTIVITY:  Today: no heavy lifting, straining or running due to procedural   sedation/anesthesia.  The following day: return to full activity including work.  DIET:  Eat and drink normally unless instructed otherwise.     TREATMENT FOR COMMON SIDE EFFECTS:  - Mild abdominal pain, nausea, belching, bloating or excessive gas:  rest,   eat lightly and use a heating pad.  - Sore Throat: treat with throat lozenges and/or gargle with warm salt   water.  - Because air was used during the procedure, expelling large amounts of air   from your rectum or belching is normal.  - If a bowel prep was taken, you may not have a bowel movement for 1-3 days.    This is normal.  SYMPTOMS TO WATCH FOR AND REPORT TO YOUR PHYSICIAN:  1. Abdominal pain or bloating, other than gas cramps.  2. Chest pain.  3. Back pain.  4. Signs of infection such as: chills or fever occurring within 24 hours   after the procedure.  5. Rectal bleeding, which would show as bright red, maroon, or black stools.   (A tablespoon of blood from the rectum is not serious, especially if    hemorrhoids are present.)  6. Vomiting.  7. Weakness or dizziness.  GO DIRECTLY TO THE NEAREST EMERGENCY ROOM IF YOU HAVE ANY OF THE FOLLOWING:      Difficulty breathing              Chills and/or fever over 101 F   Persistent vomiting and/or vomiting blood   Severe abdominal pain   Severe chest pain   Black, tarry stools   Bleeding- more than one tablespoon   Any other symptom or condition that you feel may need urgent attention  Your doctor recommends these additional instructions:  If any biopsies were taken, your doctors clinic will contact you in 1 to 2   weeks with any results.  You are being discharged to home.   Advance your diet as tolerated.   Continue your present medications.   We are waiting for your pathology results.  For questions, problems or results please call your physician - John Conklin MD at Work:  [phone].  EMERGENCY PHONE NUMBER: [phone], LAB RESULTS: [phone]  IF A COMPLICATION OR EMERGENCY SITUATION ARISES AND YOU ARE UNABLE TO REACH   YOUR PHYSICIAN - GO DIRECTLY TO THE EMERGENCY ROOM.  ___________________________________________  Nurse Signature  ___________________________________________  Patient/Designated Responsible Party Signature  John Conklin MD  5/12/2023 10:34:32 AM  This report has been verified and signed electronically.  Dear patient,  As a result of recent federal legislation (The Federal Cures Act), you may   receive lab or pathology results from your procedure in your [facility]   account before your physician is able to contact you. Your physician or   their representative will relay the results to you with their   recommendations at their soonest availability.  Thank you.  PROVATION

## 2023-05-12 NOTE — PROVATION PATIENT INSTRUCTIONS
Discharge Summary/Instructions after an Endoscopic Procedure  Patient Name: Harper Mallory  Patient MRN: [id number]  Patient YOB: 2000  Friday, May 12, 2023  John Conklin MD  Dear patient,  As a result of recent federal legislation (The Federal Cures Act), you may   receive lab or pathology results from your procedure in your [facility]   account before your physician is able to contact you. Your physician or   their representative will relay the results to you with their   recommendations at their soonest availability.  Thank you,  RESTRICTIONS:  During your procedure today, you received medications for sedation.  These   medications may affect your judgment, balance and coordination.  Therefore,   for 24 hours, you have the following restrictions:   - DO NOT drive a car, operate machinery, make legal/financial decisions,   sign important papers or drink alcohol.    ACTIVITY:  Today: no heavy lifting, straining or running due to procedural   sedation/anesthesia.  The following day: return to full activity including work.  DIET:  Eat and drink normally unless instructed otherwise.     TREATMENT FOR COMMON SIDE EFFECTS:  - Mild abdominal pain, nausea, belching, bloating or excessive gas:  rest,   eat lightly and use a heating pad.  - Sore Throat: treat with throat lozenges and/or gargle with warm salt   water.  - Because air was used during the procedure, expelling large amounts of air   from your rectum or belching is normal.  - If a bowel prep was taken, you may not have a bowel movement for 1-3 days.    This is normal.  SYMPTOMS TO WATCH FOR AND REPORT TO YOUR PHYSICIAN:  1. Abdominal pain or bloating, other than gas cramps.  2. Chest pain.  3. Back pain.  4. Signs of infection such as: chills or fever occurring within 24 hours   after the procedure.  5. Rectal bleeding, which would show as bright red, maroon, or black stools.   (A tablespoon of blood from the rectum is not serious, especially if    hemorrhoids are present.)  6. Vomiting.  7. Weakness or dizziness.  GO DIRECTLY TO THE NEAREST EMERGENCY ROOM IF YOU HAVE ANY OF THE FOLLOWING:      Difficulty breathing              Chills and/or fever over 101 F   Persistent vomiting and/or vomiting blood   Severe abdominal pain   Severe chest pain   Black, tarry stools   Bleeding- more than one tablespoon   Any other symptom or condition that you feel may need urgent attention  Your doctor recommends these additional instructions:  If any biopsies were taken, your doctors clinic will contact you in 1 to 2   weeks with any results.  You are being discharged to home.   Advance your diet as tolerated.   Continue your present medications.   We are waiting for your pathology results.   Your physician has recommended a repeat colonoscopy (date to be determined   after pending pathology results are reviewed) for surveillance based on   pathology results.   - Return to GI clinic to discuss switch of therapy  For questions, problems or results please call your physician - John Conklin MD at Work:  [phone].  EMERGENCY PHONE NUMBER: [phone], LAB RESULTS: [phone]  IF A COMPLICATION OR EMERGENCY SITUATION ARISES AND YOU ARE UNABLE TO REACH   YOUR PHYSICIAN - GO DIRECTLY TO THE EMERGENCY ROOM.  ___________________________________________  Nurse Signature  ___________________________________________  Patient/Designated Responsible Party Signature  John Conklin MD  5/12/2023 10:42:05 AM  This report has been verified and signed electronically.  Dear patient,  As a result of recent federal legislation (The Federal Cures Act), you may   receive lab or pathology results from your procedure in your [facility]   account before your physician is able to contact you. Your physician or   their representative will relay the results to you with their   recommendations at their soonest availability.  Thank you.  PROVATION

## 2023-05-12 NOTE — H&P
History & Physical - Short Stay  Gastroenterology    SUBJECTIVE:     Procedure: Colonoscopy and EGD    Chief Complaint/Indication for Procedure: upper GI (duodenal and gastric Crohn's disease) and ileocolonic Crohn's disease complicated by ileal and ascending colon stricture, status post ileal and right hemicolectomy (2020). She is currently on Stelara 90 mg every 4 weeks   PTA Medications   Medication Sig    dicyclomine (BENTYL) 10 MG capsule Take 10 mg by mouth every 6 (six) hours as needed.    ergocalciferol, vitamin D2, 10 mcg (400 unit) Tab Take 5,000 Units by mouth.    EScitalopram oxalate (LEXAPRO) 20 MG tablet Take 20 mg by mouth once daily.    metFORMIN (GLUCOPHAGE) 500 MG tablet Take 500 mg by mouth 2 (two) times daily with meals.    pantoprazole (PROTONIX) 40 MG tablet TAKE 1 TABLET BY MOUTH EVERY DAY    ustekinumab (STELARA) 90 mg/mL Syrg syringe Inject 1 mL (90 mg total) into the skin every 28 days.    ondansetron (ZOFRAN) 4 MG tablet Take 1 tablet (4 mg total) by mouth every 6 (six) hours as needed for Nausea (nausea).     Review of patient's allergies indicates:  No Known Allergies     Past Medical History:   Diagnosis Date    COVID-19     Crohn's colitis     Hypertension     Proteinuria      Past Surgical History:   Procedure Laterality Date    BOWEL RESECTION  2020    portion of large intestine removed     SECTION N/A 2021    Procedure:  SECTION;  Surgeon: Sonny Lundberg MD;  Location: [facility];  Service: OB/GYN;  Laterality: N/A;     History reviewed. No pertinent family history.  Social History     Tobacco Use    Smoking status: Never    Smokeless tobacco: Never   Substance Use Topics    Alcohol use: Never    Drug use: Never     OBJECTIVE:     Physical Exam:                                                       GENERAL:  Comfortable, in no acute distress.                                 HEENT EXAM:  Nonicteric.  No adenopathy.  Oropharynx is clear.               NECK:   Supple.                                                               LUNGS:  Clear.                                                               CARDIAC:  Regular rate and rhythm.  S1, S2.  No murmur.                      ABDOMEN:  Soft, positive bowel sounds, nontender.  No hepatosplenomegaly or masses.  No rebound or guarding.                                             EXTREMITIES:  No edema.     MENTAL STATUS:  Normal, alert and oriented.      ASSESSMENT/PLAN:     Assessment: upper GI (duodenal and gastric Crohn's disease) and ileocolonic Crohn's disease complicated by ileal and ascending colon stricture, status post ileal and right hemicolectomy (5/2020). She is currently on Stelara 90 mg every 4 weeks     Plan: Colonoscopy and EGD

## 2023-05-12 NOTE — ANESTHESIA POSTPROCEDURE EVALUATION
Anesthesia Post Evaluation    Patient: Harper CAMPA    Procedure(s) Performed: Procedure(s) (LRB):  EGD (ESOPHAGOGASTRODUODENOSCOPY) (N/A)  COLONOSCOPY (N/A)    Final Anesthesia Type: general      Patient location during evaluation: PACU  Patient participation: Yes- Able to Participate  Level of consciousness: awake and alert and oriented  Post-procedure vital signs: reviewed and stable  Pain management: adequate  Airway patency: patent    PONV status at discharge: No PONV  Anesthetic complications: no      Cardiovascular status: blood pressure returned to baseline  Respiratory status: unassisted, spontaneous ventilation and room air  Hydration status: euvolemic  Follow-up not needed.          Vitals Value Taken Time   /62 05/12/23 1057   Temp 36.4 °C (97.5 °F) 05/12/23 1057   Pulse 90 05/12/23 1057   Resp 20 05/12/23 1057   SpO2 100 % 05/12/23 1057         Event Time   Out of Recovery 10:59:00         Pain/Bhavik Score: Bhavik Score: 10 (5/12/2023 10:56 AM)

## 2023-05-12 NOTE — TRANSFER OF CARE
"Anesthesia Transfer of Care Note    Patient: Harper CAMPA    Procedure(s) Performed: Procedure(s) (LRB):  EGD (ESOPHAGOGASTRODUODENOSCOPY) (N/A)  COLONOSCOPY (N/A)    Patient location: PACU    Anesthesia Type: general    Transport from OR: Transported from OR on room air with adequate spontaneous ventilation    Post pain: adequate analgesia    Post assessment: no apparent anesthetic complications and tolerated procedure well    Post vital signs: stable    Level of consciousness: sedated and awake    Nausea/Vomiting: no nausea/vomiting    Complications: none    Transfer of care protocol was followed      Last vitals:   Visit Vitals  /80 (BP Location: Right arm, Patient Position: Sitting)   Pulse 88   Temp 36.5 °C (97.7 °F) (Skin)   Resp 16   Ht 5' 7" (1.702 m)   Wt 86.2 kg (190 lb)   SpO2 99%   Breastfeeding No   BMI 29.76 kg/m²     "

## 2023-05-18 LAB
FINAL PATHOLOGIC DIAGNOSIS: NORMAL
GROSS: NORMAL
Lab: NORMAL
MICROSCOPIC EXAM: NORMAL

## 2023-06-01 ENCOUNTER — PATIENT MESSAGE (OUTPATIENT)
Dept: GASTROENTEROLOGY | Facility: CLINIC | Age: 23
End: 2023-06-01
Payer: COMMERCIAL

## 2023-06-01 ENCOUNTER — TELEPHONE (OUTPATIENT)
Dept: GASTROENTEROLOGY | Facility: CLINIC | Age: 23
End: 2023-06-01
Payer: COMMERCIAL

## 2023-06-01 NOTE — TELEPHONE ENCOUNTER
----- Message from John Conklin MD sent at 6/1/2023  9:46 AM CDT -----  Please add on for an appt soon

## 2023-06-08 ENCOUNTER — OFFICE VISIT (OUTPATIENT)
Dept: GASTROENTEROLOGY | Facility: CLINIC | Age: 23
End: 2023-06-08
Payer: COMMERCIAL

## 2023-06-08 ENCOUNTER — PATIENT MESSAGE (OUTPATIENT)
Dept: GASTROENTEROLOGY | Facility: CLINIC | Age: 23
End: 2023-06-08
Payer: COMMERCIAL

## 2023-06-08 VITALS — WEIGHT: 196.88 LBS | BODY MASS INDEX: 30.9 KG/M2 | HEIGHT: 67 IN

## 2023-06-08 DIAGNOSIS — K50.818 CROHN'S DISEASE OF BOTH SMALL AND LARGE INTESTINE WITH OTHER COMPLICATION: Primary | ICD-10-CM

## 2023-06-08 PROCEDURE — 3008F BODY MASS INDEX DOCD: CPT | Mod: CPTII,S$GLB,, | Performed by: INTERNAL MEDICINE

## 2023-06-08 PROCEDURE — 99999 PR PBB SHADOW E&M-EST. PATIENT-LVL III: ICD-10-PCS | Mod: PBBFAC,,, | Performed by: INTERNAL MEDICINE

## 2023-06-08 PROCEDURE — 1159F MED LIST DOCD IN RCRD: CPT | Mod: CPTII,S$GLB,, | Performed by: INTERNAL MEDICINE

## 2023-06-08 PROCEDURE — 1159F PR MEDICATION LIST DOCUMENTED IN MEDICAL RECORD: ICD-10-PCS | Mod: CPTII,S$GLB,, | Performed by: INTERNAL MEDICINE

## 2023-06-08 PROCEDURE — 99215 PR OFFICE/OUTPT VISIT, EST, LEVL V, 40-54 MIN: ICD-10-PCS | Mod: S$GLB,,, | Performed by: INTERNAL MEDICINE

## 2023-06-08 PROCEDURE — 99999 PR PBB SHADOW E&M-EST. PATIENT-LVL III: CPT | Mod: PBBFAC,,, | Performed by: INTERNAL MEDICINE

## 2023-06-08 PROCEDURE — 99215 OFFICE O/P EST HI 40 MIN: CPT | Mod: S$GLB,,, | Performed by: INTERNAL MEDICINE

## 2023-06-08 PROCEDURE — 3008F PR BODY MASS INDEX (BMI) DOCUMENTED: ICD-10-PCS | Mod: CPTII,S$GLB,, | Performed by: INTERNAL MEDICINE

## 2023-06-08 RX ORDER — HEPARIN 100 UNIT/ML
500 SYRINGE INTRAVENOUS
Status: CANCELLED | OUTPATIENT
Start: 2023-06-08

## 2023-06-08 RX ORDER — IPRATROPIUM BROMIDE AND ALBUTEROL SULFATE 2.5; .5 MG/3ML; MG/3ML
3 SOLUTION RESPIRATORY (INHALATION)
Status: CANCELLED | OUTPATIENT
Start: 2023-06-08

## 2023-06-08 RX ORDER — ACETAMINOPHEN 325 MG/1
650 TABLET ORAL
Status: CANCELLED | OUTPATIENT
Start: 2023-06-08

## 2023-06-08 RX ORDER — BUPROPION HYDROCHLORIDE 150 MG/1
150 TABLET, EXTENDED RELEASE ORAL 2 TIMES DAILY
COMMUNITY
End: 2023-11-16

## 2023-06-08 RX ORDER — SODIUM CHLORIDE 0.9 % (FLUSH) 0.9 %
10 SYRINGE (ML) INJECTION
Status: CANCELLED | OUTPATIENT
Start: 2023-06-08

## 2023-06-08 RX ORDER — EPINEPHRINE 1 MG/ML
0.3 INJECTION, SOLUTION, CONCENTRATE INTRAVENOUS
Status: CANCELLED | OUTPATIENT
Start: 2023-06-08

## 2023-06-08 RX ORDER — METHYLPREDNISOLONE SOD SUCC 125 MG
40 VIAL (EA) INJECTION
Status: CANCELLED | OUTPATIENT
Start: 2023-06-08

## 2023-06-08 RX ORDER — DIPHENHYDRAMINE HYDROCHLORIDE 50 MG/ML
25 INJECTION INTRAMUSCULAR; INTRAVENOUS
Status: CANCELLED | OUTPATIENT
Start: 2023-06-08

## 2023-06-08 NOTE — PROGRESS NOTES
Chief Complaint: Crohn's disease     HPI: 22 y.o. female h/o upper GI (gastric and duodenal) and ileocolonic Crohn's disease, diagnosed 2013, complicated by ileal and ascending colon stricture, status post ileal resection and right hemicolectomy (May 2020) currently on Stelara 90 mg every 4 week monotherapy here for follow up.    She has been adherent with her Stelara 90 mg every 4 weeks. Received one additional IV dose Stelara due to increased symptoms and increased fecal calprotectin.    Unfortunately CRP was markedly elevated at 21. EGD with normal esophagus, gastritis, erythematous mucosa in antrum, normal duodenal bulb, first portion of the duodenum and second portion of the duodenum. Colonoscopy showed enhdoscopic disease activity with ulcerated mucosa in DC, erythematous, hemorrhagic, inflamed, nodular and ulcerated mucosa in the rectum and in the sigmoid colon. Biopsied. Path from EGD negative for h.pylori, positive for rare non-necrotizing granuloma in stomach. Path from colonoscopy with mild active colitis in DC, SC, rectum but no granulomas.     IBD History:   IBD disease: Crohn's disease  Disease location: Upper GI (duodenal and gastric Crohn's disease) and ileocolonic Crohn's disease   Disease behavior: Inflammatory  Current therapy:  Stelara 90 mg every 4 weeks  Prior therapy: infliximab, adalimumab, budesonide, mercaptopurine, steroids  Surgeries: ileal resection and right hemicolectomy (May 2020)   Complications:none  Extraintestinal manifestations: none    Allergies and medications reviewed    Review of Systems:   General ROS: negative for chills, fever, or weight loss  Ophthalmic ROS: negative for blurry vision, photophobia, or eye pain  ENT ROS: negative for oral ulcers, sore throat or rhinorrhea  Respiratory ROS: no cough, shortness of breath, or wheezing  Cardiovascular ROS: no chest pain, palpitations, or dyspnea on exertion  Gastrointestinal ROS: per HPI  Musculoskeletal ROS: no arthralgias,  "myalgias, joint swelling or erythema  Dermatological ROS: negative for pruritis, rash, ulcers, nodules      Physical Examination:  Ht 5' 7" (1.702 m)   Wt 89.3 kg (196 lb 13.9 oz)   LMP 06/05/2023   BMI 30.83 kg/m²   General appearance: alert, cooperative, no distress  HENT: Normocephalic, atraumatic, neck symmetrical  Eyes: conjunctivae clear  Lungs: No labored breathing  Skin: No jaundice  Neurologic: Alert and oriented X 3      Most recent Labs/Stool studies:  Lab Results   Component Value Date    WBC 13.76 (H) 03/23/2023    HGB 12.4 03/23/2023    HCT 38.7 03/23/2023    MCV 85 03/23/2023     (H) 03/23/2023     CMP  Sodium   Date Value Ref Range Status   03/23/2023 138 136 - 145 mmol/L Final     Potassium   Date Value Ref Range Status   03/23/2023 4.1 3.5 - 5.1 mmol/L Final     Chloride   Date Value Ref Range Status   03/23/2023 104 95 - 110 mmol/L Final     CO2   Date Value Ref Range Status   03/23/2023 23 23 - 29 mmol/L Final     Glucose   Date Value Ref Range Status   03/23/2023 76 70 - 110 mg/dL Final     BUN   Date Value Ref Range Status   03/23/2023 7 6 - 20 mg/dL Final     Creatinine   Date Value Ref Range Status   03/23/2023 0.7 0.5 - 1.4 mg/dL Final     Calcium   Date Value Ref Range Status   03/23/2023 10.0 8.7 - 10.5 mg/dL Final     Total Protein   Date Value Ref Range Status   03/23/2023 8.0 6.0 - 8.4 g/dL Final     Albumin   Date Value Ref Range Status   03/23/2023 3.8 3.5 - 5.2 g/dL Final     Total Bilirubin   Date Value Ref Range Status   03/23/2023 0.3 0.1 - 1.0 mg/dL Final     Comment:     For infants and newborns, interpretation of results should be based  on gestational age, weight and in agreement with clinical  observations.    Premature Infant recommended reference ranges:  Up to 24 hours.............<8.0 mg/dL  Up to 48 hours............<12.0 mg/dL  3-5 days..................<15.0 mg/dL  6-29 days.................<15.0 mg/dL       Alkaline Phosphatase   Date Value Ref Range Status "   03/23/2023 78 55 - 135 U/L Final     AST   Date Value Ref Range Status   03/23/2023 17 10 - 40 U/L Final     ALT   Date Value Ref Range Status   03/23/2023 22 10 - 44 U/L Final     Anion Gap   Date Value Ref Range Status   03/23/2023 11 8 - 16 mmol/L Final     eGFR   Date Value Ref Range Status   03/23/2023 >60.0 >60 mL/min/1.73 m^2 Final     Assessment and Plan: 22 y.o. female with    Upper GI (duodenal and gastric Crohn's disease) and ileocolonic Crohn's disease   Complicated by ileal and ascending colon stricture, status post ileal and right hemicolectomy (5/2020)  Previously on Remicade and Humira (secondary loss of response)  Currently on Stelara 90 mg every 4 weeks monotherapy (dose optimized following elevated fecal calprotectin and CRP)   Persistent endoscopic activity despite optimization of Stelara   Health maintenance    Harper states she has been feeling fairly well on Stelara 90 mg every 4 weeks. She did receive one additional IV dose due to increased fecal calprotectin. Unfortunately due to persistent endoscopic disease activity we will need to switch therapy. She is planning to get on Entyvio as next medication. She is looking to establish in [place] which is close to her home. Will plan to discuss with those providers.     Follow up: Will need follow up with [facility]     Healthcare Maintenance:  -COVID: refused  Prevnar 13: 2/26/2020  -Pneumovax 23: 8/4/2020  -Flu Shot: 10/25/2020  -Shingrix: recommend  -Tdap: UTD  -Hepatitis B: UTD  -Cervical cancer screening: follows with Dr. Lundberg  -Skin cancer screening: recommend  -Colon cancer screening: dx 2013, last colonoscopy 2021  -TB/Hep B screening: UTD  -Bone density: DEXA w/ osteopenia  -Tobacco: avoids   -Should avoid NSAIDs and narcotics, use only Tylenol for pain relief.       40 minutes of total time spent on the encounter, which includes face to face time and non-face to face time preparing to see the patient (eg, review of  tests), obtaining and/or reviewing separately obtained history, documenting clinical information in the electronic or other health record, Independently interpreting results (not separately reported) and communicating results to the patient/family/caregiver, or care coordination (not separately reported).          John Conklin MD  [facility]  [address] [facility] [place] [address]  Office: [phone]  Fax: [phone]

## 2023-06-21 ENCOUNTER — INFUSION (OUTPATIENT)
Dept: INFUSION THERAPY | Facility: HOSPITAL | Age: 23
End: 2023-06-21
Attending: INTERNAL MEDICINE
Payer: COMMERCIAL

## 2023-06-21 VITALS
HEIGHT: 67 IN | WEIGHT: 197.56 LBS | SYSTOLIC BLOOD PRESSURE: 113 MMHG | RESPIRATION RATE: 18 BRPM | DIASTOLIC BLOOD PRESSURE: 74 MMHG | BODY MASS INDEX: 31.01 KG/M2 | HEART RATE: 89 BPM | TEMPERATURE: 98 F

## 2023-06-21 DIAGNOSIS — K50.818 CROHN'S DISEASE OF BOTH SMALL AND LARGE INTESTINE WITH OTHER COMPLICATION: Primary | ICD-10-CM

## 2023-06-21 LAB
ALBUMIN SERPL BCP-MCNC: 3.9 G/DL (ref 3.5–5.2)
ALP SERPL-CCNC: 78 U/L (ref 55–135)
ALT SERPL W/O P-5'-P-CCNC: 21 U/L (ref 10–44)
ANION GAP SERPL CALC-SCNC: 12 MMOL/L (ref 8–16)
AST SERPL-CCNC: 15 U/L (ref 10–40)
BASOPHILS # BLD AUTO: 0.05 K/UL (ref 0–0.2)
BASOPHILS NFR BLD: 0.5 % (ref 0–1.9)
BILIRUB SERPL-MCNC: 0.2 MG/DL (ref 0.1–1)
BUN SERPL-MCNC: 8 MG/DL (ref 6–20)
CALCIUM SERPL-MCNC: 9.9 MG/DL (ref 8.7–10.5)
CHLORIDE SERPL-SCNC: 103 MMOL/L (ref 95–110)
CO2 SERPL-SCNC: 23 MMOL/L (ref 23–29)
CREAT SERPL-MCNC: 0.8 MG/DL (ref 0.5–1.4)
DIFFERENTIAL METHOD: ABNORMAL
EOSINOPHIL # BLD AUTO: 0.1 K/UL (ref 0–0.5)
EOSINOPHIL NFR BLD: 1 % (ref 0–8)
ERYTHROCYTE [DISTWIDTH] IN BLOOD BY AUTOMATED COUNT: 15.4 % (ref 11.5–14.5)
EST. GFR  (NO RACE VARIABLE): >60 ML/MIN/1.73 M^2
GLUCOSE SERPL-MCNC: 102 MG/DL (ref 70–110)
HCT VFR BLD AUTO: 37.3 % (ref 37–48.5)
HGB BLD-MCNC: 12.3 G/DL (ref 12–16)
IMM GRANULOCYTES # BLD AUTO: 0.05 K/UL (ref 0–0.04)
IMM GRANULOCYTES NFR BLD AUTO: 0.5 % (ref 0–0.5)
LYMPHOCYTES # BLD AUTO: 2.8 K/UL (ref 1–4.8)
LYMPHOCYTES NFR BLD: 26.7 % (ref 18–48)
MCH RBC QN AUTO: 27.9 PG (ref 27–31)
MCHC RBC AUTO-ENTMCNC: 33 G/DL (ref 32–36)
MCV RBC AUTO: 85 FL (ref 82–98)
MONOCYTES # BLD AUTO: 0.8 K/UL (ref 0.3–1)
MONOCYTES NFR BLD: 7.7 % (ref 4–15)
NEUTROPHILS # BLD AUTO: 6.7 K/UL (ref 1.8–7.7)
NEUTROPHILS NFR BLD: 63.6 % (ref 38–73)
NRBC BLD-RTO: 0 /100 WBC
PLATELET # BLD AUTO: 430 K/UL (ref 150–450)
PMV BLD AUTO: 9.8 FL (ref 9.2–12.9)
POTASSIUM SERPL-SCNC: 3.7 MMOL/L (ref 3.5–5.1)
PROT SERPL-MCNC: 7.9 G/DL (ref 6–8.4)
RBC # BLD AUTO: 4.41 M/UL (ref 4–5.4)
SODIUM SERPL-SCNC: 138 MMOL/L (ref 136–145)
WBC # BLD AUTO: 10.5 K/UL (ref 3.9–12.7)

## 2023-06-21 PROCEDURE — 25000003 PHARM REV CODE 250: Mod: PN | Performed by: INTERNAL MEDICINE

## 2023-06-21 PROCEDURE — 63600175 PHARM REV CODE 636 W HCPCS: Mod: JZ,JG,PN | Performed by: INTERNAL MEDICINE

## 2023-06-21 PROCEDURE — 85025 COMPLETE CBC W/AUTO DIFF WBC: CPT | Mod: PN | Performed by: INTERNAL MEDICINE

## 2023-06-21 PROCEDURE — 80053 COMPREHEN METABOLIC PANEL: CPT | Mod: PN | Performed by: INTERNAL MEDICINE

## 2023-06-21 PROCEDURE — 96365 THER/PROPH/DIAG IV INF INIT: CPT | Mod: PN

## 2023-06-21 RX ORDER — EPINEPHRINE 1 MG/ML
0.3 INJECTION, SOLUTION, CONCENTRATE INTRAVENOUS
Status: CANCELLED | OUTPATIENT
Start: 2023-08-10

## 2023-06-21 RX ORDER — DIPHENHYDRAMINE HYDROCHLORIDE 50 MG/ML
25 INJECTION INTRAMUSCULAR; INTRAVENOUS
Status: CANCELLED | OUTPATIENT
Start: 2023-08-10

## 2023-06-21 RX ORDER — HEPARIN 100 UNIT/ML
500 SYRINGE INTRAVENOUS
Status: DISCONTINUED | OUTPATIENT
Start: 2023-06-21 | End: 2023-06-21 | Stop reason: HOSPADM

## 2023-06-21 RX ORDER — SODIUM CHLORIDE 0.9 % (FLUSH) 0.9 %
10 SYRINGE (ML) INJECTION
Status: DISCONTINUED | OUTPATIENT
Start: 2023-06-21 | End: 2023-06-21 | Stop reason: HOSPADM

## 2023-06-21 RX ORDER — SODIUM CHLORIDE 0.9 % (FLUSH) 0.9 %
10 SYRINGE (ML) INJECTION
Status: CANCELLED | OUTPATIENT
Start: 2023-08-10

## 2023-06-21 RX ORDER — HEPARIN 100 UNIT/ML
500 SYRINGE INTRAVENOUS
Status: CANCELLED | OUTPATIENT
Start: 2023-08-10

## 2023-06-21 RX ORDER — IPRATROPIUM BROMIDE AND ALBUTEROL SULFATE 2.5; .5 MG/3ML; MG/3ML
3 SOLUTION RESPIRATORY (INHALATION)
Status: CANCELLED | OUTPATIENT
Start: 2023-08-10

## 2023-06-21 RX ORDER — ACETAMINOPHEN 325 MG/1
650 TABLET ORAL
Status: CANCELLED | OUTPATIENT
Start: 2023-08-10

## 2023-06-21 RX ORDER — METHYLPREDNISOLONE SOD SUCC 125 MG
40 VIAL (EA) INJECTION
Status: CANCELLED | OUTPATIENT
Start: 2023-08-10

## 2023-06-21 RX ADMIN — SODIUM CHLORIDE: 9 INJECTION, SOLUTION INTRAVENOUS at 02:06

## 2023-06-21 RX ADMIN — VEDOLIZUMAB 300 MG: 300 INJECTION, POWDER, LYOPHILIZED, FOR SOLUTION INTRAVENOUS at 02:06

## 2023-06-21 NOTE — PLAN OF CARE
Tolerated entyvio well.  No reactions noted.  No questions or concerns at this time.  Ambulated off unit in NAD.

## 2023-06-26 RX ORDER — PANTOPRAZOLE SODIUM 40 MG/1
TABLET, DELAYED RELEASE ORAL
Qty: 90 TABLET | Refills: 1 | Status: SHIPPED | OUTPATIENT
Start: 2023-06-26 | End: 2023-11-13 | Stop reason: SDUPTHER

## 2023-07-05 ENCOUNTER — INFUSION (OUTPATIENT)
Dept: INFUSION THERAPY | Facility: HOSPITAL | Age: 23
End: 2023-07-05
Attending: INTERNAL MEDICINE
Payer: COMMERCIAL

## 2023-07-05 VITALS
HEIGHT: 67 IN | TEMPERATURE: 99 F | DIASTOLIC BLOOD PRESSURE: 73 MMHG | HEART RATE: 85 BPM | BODY MASS INDEX: 31.01 KG/M2 | WEIGHT: 197.56 LBS | SYSTOLIC BLOOD PRESSURE: 123 MMHG | RESPIRATION RATE: 18 BRPM

## 2023-07-05 DIAGNOSIS — K50.818 CROHN'S DISEASE OF BOTH SMALL AND LARGE INTESTINE WITH OTHER COMPLICATION: Primary | ICD-10-CM

## 2023-07-05 LAB
ALBUMIN SERPL BCP-MCNC: 3.7 G/DL (ref 3.5–5.2)
ALP SERPL-CCNC: 76 U/L (ref 55–135)
ALT SERPL W/O P-5'-P-CCNC: 20 U/L (ref 10–44)
ANION GAP SERPL CALC-SCNC: 10 MMOL/L (ref 8–16)
AST SERPL-CCNC: 12 U/L (ref 10–40)
BASOPHILS # BLD AUTO: 0.03 K/UL (ref 0–0.2)
BASOPHILS NFR BLD: 0.3 % (ref 0–1.9)
BILIRUB SERPL-MCNC: 0.2 MG/DL (ref 0.1–1)
BUN SERPL-MCNC: 9 MG/DL (ref 6–20)
CALCIUM SERPL-MCNC: 9.1 MG/DL (ref 8.7–10.5)
CHLORIDE SERPL-SCNC: 104 MMOL/L (ref 95–110)
CO2 SERPL-SCNC: 23 MMOL/L (ref 23–29)
CREAT SERPL-MCNC: 0.7 MG/DL (ref 0.5–1.4)
DIFFERENTIAL METHOD: ABNORMAL
EOSINOPHIL # BLD AUTO: 0.1 K/UL (ref 0–0.5)
EOSINOPHIL NFR BLD: 0.8 % (ref 0–8)
ERYTHROCYTE [DISTWIDTH] IN BLOOD BY AUTOMATED COUNT: 15.1 % (ref 11.5–14.5)
EST. GFR  (NO RACE VARIABLE): >60 ML/MIN/1.73 M^2
GLUCOSE SERPL-MCNC: 87 MG/DL (ref 70–110)
HCT VFR BLD AUTO: 36.5 % (ref 37–48.5)
HGB BLD-MCNC: 11.8 G/DL (ref 12–16)
IMM GRANULOCYTES # BLD AUTO: 0.03 K/UL (ref 0–0.04)
IMM GRANULOCYTES NFR BLD AUTO: 0.3 % (ref 0–0.5)
LYMPHOCYTES # BLD AUTO: 2.9 K/UL (ref 1–4.8)
LYMPHOCYTES NFR BLD: 30.5 % (ref 18–48)
MCH RBC QN AUTO: 27.3 PG (ref 27–31)
MCHC RBC AUTO-ENTMCNC: 32.3 G/DL (ref 32–36)
MCV RBC AUTO: 84 FL (ref 82–98)
MONOCYTES # BLD AUTO: 0.7 K/UL (ref 0.3–1)
MONOCYTES NFR BLD: 7.8 % (ref 4–15)
NEUTROPHILS # BLD AUTO: 5.7 K/UL (ref 1.8–7.7)
NEUTROPHILS NFR BLD: 60.3 % (ref 38–73)
NRBC BLD-RTO: 0 /100 WBC
PLATELET # BLD AUTO: 416 K/UL (ref 150–450)
PMV BLD AUTO: 9.7 FL (ref 9.2–12.9)
POTASSIUM SERPL-SCNC: 4.2 MMOL/L (ref 3.5–5.1)
PROT SERPL-MCNC: 7.4 G/DL (ref 6–8.4)
RBC # BLD AUTO: 4.33 M/UL (ref 4–5.4)
SODIUM SERPL-SCNC: 137 MMOL/L (ref 136–145)
WBC # BLD AUTO: 9.44 K/UL (ref 3.9–12.7)

## 2023-07-05 PROCEDURE — 63600175 PHARM REV CODE 636 W HCPCS: Mod: JZ,JG,PN | Performed by: INTERNAL MEDICINE

## 2023-07-05 PROCEDURE — 96365 THER/PROPH/DIAG IV INF INIT: CPT | Mod: PN

## 2023-07-05 PROCEDURE — 85025 COMPLETE CBC W/AUTO DIFF WBC: CPT | Mod: PN | Performed by: INTERNAL MEDICINE

## 2023-07-05 PROCEDURE — 25000003 PHARM REV CODE 250: Mod: PN | Performed by: INTERNAL MEDICINE

## 2023-07-05 PROCEDURE — A4216 STERILE WATER/SALINE, 10 ML: HCPCS | Mod: PN | Performed by: INTERNAL MEDICINE

## 2023-07-05 PROCEDURE — 80053 COMPREHEN METABOLIC PANEL: CPT | Mod: PN | Performed by: INTERNAL MEDICINE

## 2023-07-05 RX ORDER — DIPHENHYDRAMINE HYDROCHLORIDE 50 MG/ML
25 INJECTION INTRAMUSCULAR; INTRAVENOUS
Status: DISCONTINUED | OUTPATIENT
Start: 2023-07-05 | End: 2023-07-05 | Stop reason: HOSPADM

## 2023-07-05 RX ORDER — DIPHENHYDRAMINE HYDROCHLORIDE 50 MG/ML
25 INJECTION INTRAMUSCULAR; INTRAVENOUS
Status: CANCELLED | OUTPATIENT
Start: 2023-08-10

## 2023-07-05 RX ORDER — EPINEPHRINE 1 MG/ML
0.3 INJECTION, SOLUTION, CONCENTRATE INTRAVENOUS
Status: DISPENSED | OUTPATIENT
Start: 2023-07-05 | End: 2023-07-05

## 2023-07-05 RX ORDER — IPRATROPIUM BROMIDE AND ALBUTEROL SULFATE 2.5; .5 MG/3ML; MG/3ML
3 SOLUTION RESPIRATORY (INHALATION)
Status: DISCONTINUED | OUTPATIENT
Start: 2023-07-05 | End: 2023-07-05

## 2023-07-05 RX ORDER — SODIUM CHLORIDE 0.9 % (FLUSH) 0.9 %
10 SYRINGE (ML) INJECTION
Status: CANCELLED | OUTPATIENT
Start: 2023-08-10

## 2023-07-05 RX ORDER — ACETAMINOPHEN 325 MG/1
650 TABLET ORAL
Status: CANCELLED | OUTPATIENT
Start: 2023-08-10

## 2023-07-05 RX ORDER — METHYLPREDNISOLONE SOD SUCC 125 MG
40 VIAL (EA) INJECTION
Status: CANCELLED | OUTPATIENT
Start: 2023-08-10

## 2023-07-05 RX ORDER — ACETAMINOPHEN 325 MG/1
650 TABLET ORAL
Status: ACTIVE | OUTPATIENT
Start: 2023-07-05 | End: 2023-07-05

## 2023-07-05 RX ORDER — SODIUM CHLORIDE 0.9 % (FLUSH) 0.9 %
10 SYRINGE (ML) INJECTION
Status: DISCONTINUED | OUTPATIENT
Start: 2023-07-05 | End: 2023-07-05 | Stop reason: HOSPADM

## 2023-07-05 RX ORDER — EPINEPHRINE 1 MG/ML
0.3 INJECTION, SOLUTION, CONCENTRATE INTRAVENOUS
Status: CANCELLED | OUTPATIENT
Start: 2023-08-10

## 2023-07-05 RX ORDER — IPRATROPIUM BROMIDE AND ALBUTEROL SULFATE 2.5; .5 MG/3ML; MG/3ML
3 SOLUTION RESPIRATORY (INHALATION)
Status: CANCELLED | OUTPATIENT
Start: 2023-08-10

## 2023-07-05 RX ORDER — HEPARIN 100 UNIT/ML
500 SYRINGE INTRAVENOUS
Status: CANCELLED | OUTPATIENT
Start: 2023-08-10

## 2023-07-05 RX ADMIN — SODIUM CHLORIDE: 9 INJECTION, SOLUTION INTRAVENOUS at 01:07

## 2023-07-05 RX ADMIN — VEDOLIZUMAB 300 MG: 300 INJECTION, POWDER, LYOPHILIZED, FOR SOLUTION INTRAVENOUS at 02:07

## 2023-07-05 RX ADMIN — Medication 10 ML: at 02:07

## 2023-07-05 NOTE — PLAN OF CARE
Problem: Adult Inpatient Plan of Care  Goal: Plan of Care Review  Outcome: Ongoing, Progressing  Flowsheets (Taken 7/5/2023 1423)  Plan of Care Reviewed With: patient  Goal: Patient-Specific Goal (Individualized)  Outcome: Ongoing, Progressing  Flowsheets (Taken 7/5/2023 1423)  Anxieties, Fears or Concerns: none  Individualized Care Needs: recliner, conversation  Goal: Absence of Hospital-Acquired Illness or Injury  Outcome: Ongoing, Progressing  Goal: Optimal Comfort and Wellbeing  Outcome: Ongoing, Progressing  Goal: Readiness for Transition of Care  Outcome: Ongoing, Progressing     Problem: Fatigue  Goal: Improved Activity Tolerance  Outcome: Ongoing, Progressing  Intervention: Promote Improved Energy  Flowsheets (Taken 7/5/2023 1423)  Fatigue Management:   activity schedule adjusted   paced activity encouraged   frequent rest breaks encouraged  Sleep/Rest Enhancement:   natural light exposure provided   noise level reduced   reading promoted   regular sleep/rest pattern promoted   room darkened  Activity Management:   Ambulated -L4   Ambulated to bathroom - L4   Ambulated in srinivasan - L4   Up in stretcher chair - L1   Pt tolerated Entyvio well today. NAD/no concerns voiced. Reviewed follow-up appointments. All questions were answered, ambulated independently at d/c.

## 2023-07-12 ENCOUNTER — PATIENT MESSAGE (OUTPATIENT)
Dept: GASTROENTEROLOGY | Facility: CLINIC | Age: 23
End: 2023-07-12
Payer: COMMERCIAL

## 2023-07-12 DIAGNOSIS — K50.80 CROHN'S DISEASE OF BOTH SMALL AND LARGE INTESTINE WITHOUT COMPLICATION: Primary | ICD-10-CM

## 2023-07-13 ENCOUNTER — LAB VISIT (OUTPATIENT)
Dept: LAB | Facility: HOSPITAL | Age: 23
End: 2023-07-13
Attending: INTERNAL MEDICINE
Payer: COMMERCIAL

## 2023-07-13 DIAGNOSIS — K50.80 CROHN'S DISEASE OF BOTH SMALL AND LARGE INTESTINE WITHOUT COMPLICATION: ICD-10-CM

## 2023-07-13 LAB
ALBUMIN SERPL BCP-MCNC: 3.8 G/DL (ref 3.5–5.2)
ALP SERPL-CCNC: 68 U/L (ref 55–135)
ALT SERPL W/O P-5'-P-CCNC: 12 U/L (ref 10–44)
ANION GAP SERPL CALC-SCNC: 9 MMOL/L (ref 8–16)
AST SERPL-CCNC: 14 U/L (ref 10–40)
BASOPHILS # BLD AUTO: 0.05 K/UL (ref 0–0.2)
BASOPHILS NFR BLD: 0.5 % (ref 0–1.9)
BILIRUB SERPL-MCNC: 0.2 MG/DL (ref 0.1–1)
BUN SERPL-MCNC: 9 MG/DL (ref 6–20)
CALCIUM SERPL-MCNC: 9.9 MG/DL (ref 8.7–10.5)
CHLORIDE SERPL-SCNC: 104 MMOL/L (ref 95–110)
CO2 SERPL-SCNC: 26 MMOL/L (ref 23–29)
CREAT SERPL-MCNC: 0.7 MG/DL (ref 0.5–1.4)
DIFFERENTIAL METHOD: ABNORMAL
EOSINOPHIL # BLD AUTO: 0.1 K/UL (ref 0–0.5)
EOSINOPHIL NFR BLD: 1 % (ref 0–8)
ERYTHROCYTE [DISTWIDTH] IN BLOOD BY AUTOMATED COUNT: 15.5 % (ref 11.5–14.5)
EST. GFR  (NO RACE VARIABLE): >60 ML/MIN/1.73 M^2
GLUCOSE SERPL-MCNC: 109 MG/DL (ref 70–110)
HCT VFR BLD AUTO: 38.9 % (ref 37–48.5)
HGB BLD-MCNC: 11.9 G/DL (ref 12–16)
IMM GRANULOCYTES # BLD AUTO: 0.03 K/UL (ref 0–0.04)
IMM GRANULOCYTES NFR BLD AUTO: 0.3 % (ref 0–0.5)
IRON SERPL-MCNC: 45 UG/DL (ref 30–160)
LYMPHOCYTES # BLD AUTO: 2.8 K/UL (ref 1–4.8)
LYMPHOCYTES NFR BLD: 31 % (ref 18–48)
MCH RBC QN AUTO: 27.2 PG (ref 27–31)
MCHC RBC AUTO-ENTMCNC: 30.6 G/DL (ref 32–36)
MCV RBC AUTO: 89 FL (ref 82–98)
MONOCYTES # BLD AUTO: 0.6 K/UL (ref 0.3–1)
MONOCYTES NFR BLD: 6.7 % (ref 4–15)
NEUTROPHILS # BLD AUTO: 5.5 K/UL (ref 1.8–7.7)
NEUTROPHILS NFR BLD: 60.5 % (ref 38–73)
NRBC BLD-RTO: 0 /100 WBC
PLATELET # BLD AUTO: 444 K/UL (ref 150–450)
PMV BLD AUTO: 9.8 FL (ref 9.2–12.9)
POTASSIUM SERPL-SCNC: 3.9 MMOL/L (ref 3.5–5.1)
PROT SERPL-MCNC: 7.6 G/DL (ref 6–8.4)
RBC # BLD AUTO: 4.37 M/UL (ref 4–5.4)
SATURATED IRON: 10 % (ref 20–50)
SODIUM SERPL-SCNC: 139 MMOL/L (ref 136–145)
TOTAL IRON BINDING CAPACITY: 448 UG/DL (ref 250–450)
TRANSFERRIN SERPL-MCNC: 303 MG/DL (ref 200–375)
WBC # BLD AUTO: 9.16 K/UL (ref 3.9–12.7)

## 2023-07-13 PROCEDURE — 84466 ASSAY OF TRANSFERRIN: CPT | Performed by: INTERNAL MEDICINE

## 2023-07-13 PROCEDURE — 82728 ASSAY OF FERRITIN: CPT | Performed by: INTERNAL MEDICINE

## 2023-07-13 PROCEDURE — 80053 COMPREHEN METABOLIC PANEL: CPT | Performed by: INTERNAL MEDICINE

## 2023-07-13 PROCEDURE — 85025 COMPLETE CBC W/AUTO DIFF WBC: CPT | Performed by: INTERNAL MEDICINE

## 2023-07-13 PROCEDURE — 36415 COLL VENOUS BLD VENIPUNCTURE: CPT | Mod: PO | Performed by: INTERNAL MEDICINE

## 2023-07-14 LAB — FERRITIN SERPL-MCNC: 21 NG/ML (ref 20–300)

## 2023-07-19 ENCOUNTER — LAB VISIT (OUTPATIENT)
Dept: LAB | Facility: HOSPITAL | Age: 23
End: 2023-07-19
Attending: INTERNAL MEDICINE
Payer: COMMERCIAL

## 2023-07-19 DIAGNOSIS — K50.80 CROHN'S DISEASE OF BOTH SMALL AND LARGE INTESTINE WITHOUT COMPLICATION: ICD-10-CM

## 2023-07-19 PROCEDURE — 83993 ASSAY FOR CALPROTECTIN FECAL: CPT | Performed by: INTERNAL MEDICINE

## 2023-07-24 ENCOUNTER — OFFICE VISIT (OUTPATIENT)
Dept: GASTROENTEROLOGY | Facility: CLINIC | Age: 23
End: 2023-07-24
Payer: COMMERCIAL

## 2023-07-24 VITALS
DIASTOLIC BLOOD PRESSURE: 74 MMHG | OXYGEN SATURATION: 98 % | HEIGHT: 67 IN | SYSTOLIC BLOOD PRESSURE: 110 MMHG | HEART RATE: 80 BPM | BODY MASS INDEX: 31.59 KG/M2 | WEIGHT: 201.25 LBS

## 2023-07-24 DIAGNOSIS — K50.818 CROHN'S DISEASE OF BOTH SMALL AND LARGE INTESTINE WITH OTHER COMPLICATION: Primary | ICD-10-CM

## 2023-07-24 DIAGNOSIS — D84.9 IMMUNOSUPPRESSED STATUS: ICD-10-CM

## 2023-07-24 PROCEDURE — 99215 OFFICE O/P EST HI 40 MIN: CPT | Mod: S$GLB,,, | Performed by: INTERNAL MEDICINE

## 2023-07-24 PROCEDURE — 99215 PR OFFICE/OUTPT VISIT, EST, LEVL V, 40-54 MIN: ICD-10-PCS | Mod: S$GLB,,, | Performed by: INTERNAL MEDICINE

## 2023-07-24 PROCEDURE — 3008F BODY MASS INDEX DOCD: CPT | Mod: CPTII,S$GLB,, | Performed by: INTERNAL MEDICINE

## 2023-07-24 PROCEDURE — 1159F PR MEDICATION LIST DOCUMENTED IN MEDICAL RECORD: ICD-10-PCS | Mod: CPTII,S$GLB,, | Performed by: INTERNAL MEDICINE

## 2023-07-24 PROCEDURE — 1159F MED LIST DOCD IN RCRD: CPT | Mod: CPTII,S$GLB,, | Performed by: INTERNAL MEDICINE

## 2023-07-24 PROCEDURE — 99999 PR PBB SHADOW E&M-EST. PATIENT-LVL III: CPT | Mod: PBBFAC,,, | Performed by: INTERNAL MEDICINE

## 2023-07-24 PROCEDURE — 3074F SYST BP LT 130 MM HG: CPT | Mod: CPTII,S$GLB,, | Performed by: INTERNAL MEDICINE

## 2023-07-24 PROCEDURE — 99999 PR PBB SHADOW E&M-EST. PATIENT-LVL III: ICD-10-PCS | Mod: PBBFAC,,, | Performed by: INTERNAL MEDICINE

## 2023-07-24 PROCEDURE — 3074F PR MOST RECENT SYSTOLIC BLOOD PRESSURE < 130 MM HG: ICD-10-PCS | Mod: CPTII,S$GLB,, | Performed by: INTERNAL MEDICINE

## 2023-07-24 PROCEDURE — 3008F PR BODY MASS INDEX (BMI) DOCUMENTED: ICD-10-PCS | Mod: CPTII,S$GLB,, | Performed by: INTERNAL MEDICINE

## 2023-07-24 PROCEDURE — 3078F PR MOST RECENT DIASTOLIC BLOOD PRESSURE < 80 MM HG: ICD-10-PCS | Mod: CPTII,S$GLB,, | Performed by: INTERNAL MEDICINE

## 2023-07-24 PROCEDURE — 3078F DIAST BP <80 MM HG: CPT | Mod: CPTII,S$GLB,, | Performed by: INTERNAL MEDICINE

## 2023-07-24 RX ORDER — ONDANSETRON 4 MG/1
4 TABLET, FILM COATED ORAL EVERY 6 HOURS PRN
Qty: 10 TABLET | Refills: 1 | Status: SHIPPED | OUTPATIENT
Start: 2023-07-24 | End: 2023-09-18

## 2023-07-24 NOTE — PROGRESS NOTES
Clinic Consult:  [facility] Consultation Note    Reason for Consult:  The primary encounter diagnosis was Crohn's disease of both small and large intestine with other complication. A diagnosis of Immunosuppressed status was also pertinent to this visit.    PCP: SLOAN Baxter       HPI:  This is a 22 y.o. female here for evaluation of CD    IBD History  - Type: crohn's disease  - Disease Location: small bowel and colon and stomach   - Phenotype: stricture   - Diagnosed: age 12; 2013   - Surgeries related to IBD:   2020 Ileocolonic resection (at [facility]) -- was on humira at that time  - Extra-intestinal Manifestations: oral aphthous ulcers; joint pain; erythema nodosum   - pregnancy 2020 (on stelara, did well with pregnancy and delivery); c section     Current Medications  Entyvio (still in induction)    Past Medications  Stelara (started 2020 - 2023): was bumped to every 4 weeks   Humira (she thinks antibodies)   Remicade (ineffective)   Prednisone (during initial years)    Drug and Antibody Levels   ?     Endoscopy Reports  5/2023: christy-terminal ileum normal, ulcerated anastomosis, ulceration in descending; patchy inflammation in sigmoid    Pertinent Imaging  none    Interval History  Had wisdom teeth in March 2023, was on NSAIDs twice a day for 7-10 days  Was scoped for follow up for dysplasia screening in spring 2023, ulcers in left colon and then switched to entyvio from stelara.  Says she is doing well now.  She had a successful pregnancy on stelara and did not want to switch to entyvio but did.  She is now wanting to get pregnant again.  Denies pain, nausea, vomiting, weight loss, change in appetite, diarrhea, frequency, urgency.      Preventative Medicine    Immunizations  - Influenza: utd  - Pnemococcal: PCV-13: 2020; PSV-23 2020  - Hepatitis A/B: 2020  - Herpes Zoster: not done  - COVID-19: not done     Cancer Prevention   - Date of last pap smear: Jan 2023   - Date of last skin cancer  screenin      Bone Health  - Vitamin D level: 41  - Date of last DEXA     Therapy Related Testing  - Date of last TB testing: ?  - TPMT status: ?    Miscellaneous  - Vitamin B 12 level (if ileal disease or resection):   - Smoking status: no  - NSAID use: no  - History of C. Diff: yes - prior to surgery; once, hospitalized at [facility] and treated   - Family planning: would like to try this year    Recent Labs  Lab Results   Component Value Date    WBC 9.16 2023    HGB 11.9 (L) 2023    HCT 38.9 2023     2023    ALT 12 2023    AST 14 2023    BUN 9 2023    CREATININE 0.7 2023     2023    K 3.9 2023     2023     Lab Results   Component Value Date    CRP 21.1 (H) 2023    CALPROTECTIN 145.0 (H) 2023     Lab Results   Component Value Date    HEPBSAG Non-reactive 2022     Lab Results   Component Value Date    MROLRQHI58ZO 41 2023    LRVWYVVP85 >2000 (H) 2023     Lab Results   Component Value Date    TBGOLDPLUS Negative 2022          ROS:  CONSTITUTIONAL: Denies weight change,  fatigue, fevers, chills, night sweats.  EYES: No changes in vision.   ENT: No oral lesions or sore throat.  HEMATOLOGICAL/Lymph: Denies bleeding tendency, bruising tendency. No swellings or enlarged lymph nodes.  CARDIOVASCULAR: Denies chest pain, shortness of breath, orthopnea and edema.  RESPIRATORY: Denies cough, hemoptysis, dyspnea, and wheezing.  GI: See HPI.  : Denies dysuria and hematuria  MUSCULOSKELETAL: Denies joint pain or swelling, back pain and muscle pain.  SKIN: Denies rashes.  NEUROLOGIC: Denies headaches, seizures and numbness.  PSYCHIATRIC: Denies depression or anxiety.  ENDOCRINE: Denies heat or cold intolerance and excessive thirst or urination.    Medical History:   Past Medical History:   Diagnosis Date    COVID-19     Crohn's colitis     Hypertension     Proteinuria        Surgical  History:  Past Surgical History:   Procedure Laterality Date    BOWEL RESECTION  2020    portion of large intestine removed     SECTION N/A 2021    Procedure:  SECTION;  Surgeon: Sonny Lundberg MD;  Location: [facility] L&D;  Service: OB/GYN;  Laterality: N/A;    COLONOSCOPY N/A 2023    Procedure: COLONOSCOPY;  Surgeon: John Conklin MD;  Location: [facility] ENDO;  Service: Endoscopy;  Laterality: N/A;    ESOPHAGOGASTRODUODENOSCOPY N/A 2023    Procedure: EGD (ESOPHAGOGASTRODUODENOSCOPY);  Surgeon: John Conklin MD;  Location: [facility] ENDO;  Service: Endoscopy;  Laterality: N/A;       Family History:   No family history on file.    Social History:   Social History     Tobacco Use    Smoking status: Never    Smokeless tobacco: Never   Substance Use Topics    Alcohol use: Never    Drug use: Never       Allergies: Reviewed    Home Medications:   Medication List with Changes/Refills   Current Medications    BUPROPION (WELLBUTRIN SR) 150 MG TBSR 12 HR TABLET    Take 150 mg by mouth 2 (two) times daily.    DICYCLOMINE (BENTYL) 10 MG CAPSULE    Take 10 mg by mouth every 6 (six) hours as needed.    ERGOCALCIFEROL, VITAMIN D2, 10 MCG (400 UNIT) TAB    Take 5,000 Units by mouth.    ESCITALOPRAM OXALATE (LEXAPRO) 20 MG TABLET    Take 20 mg by mouth once daily.    METFORMIN (GLUCOPHAGE) 500 MG TABLET    Take 500 mg by mouth 2 (two) times daily with meals.    PANTOPRAZOLE (PROTONIX) 40 MG TABLET    TAKE 1 TABLET BY MOUTH EVERY DAY   Changed and/or Refilled Medications    Modified Medication Previous Medication    ONDANSETRON (ZOFRAN) 4 MG TABLET ondansetron (ZOFRAN) 4 MG tablet       Take 1 tablet (4 mg total) by mouth every 6 (six) hours as needed for Nausea (nausea).    Take 1 tablet (4 mg total) by mouth every 6 (six) hours as needed for Nausea (nausea).   Discontinued Medications    USTEKINUMAB (STELARA) 90 MG/ML SYRG SYRINGE    Inject 1 mL (90 mg total) into the skin every 28 days.  "        Physical Exam:  Vital Signs:  /74   Pulse 80   Ht 5' 7" (1.702 m)   Wt 91.3 kg (201 lb 4.5 oz)   LMP 06/01/2023   SpO2 98%   BMI 31.52 kg/m²   Body mass index is 31.52 kg/m².      GENERAL: No acute distress, A&Ox3  EYES: Anicteric, no pallor noted.  ENT: OP clear  NECK: Supple, no masses, no thyromegally.  CHEST: Equal breath sounds bilaterally, no wheezing.  CARDIOVASCULAR: Regular rate and rhythm. Murmurs, rubs and gallops absent.  ABDOMEN: soft, non-tender, non-distended, normal bowel sounds, no hepatosplenomegaly   EXTREMITIES: No clubbing, cyanosis or edema.  SKIN: Without lesion or erythema.  LYMPH: No cervical, axillary lymphadenopathy palpable.   NEUROLOGICAL: Grossly normal, no asterixis present.    Labs: Pertinent labs reviewed.    Assessment and Plan:  Crohn's disease of both small and large intestine with other complication  Pt with h/o Crohn's s/p ileocolonic resection for stricture.  Was on stelara every 4 weeks until the spring when she had evidence of active disease on colonoscopy (although she says she was taking nsaids at the time). She was switched to entyvio at that time.  She is doing well clinically.  We discussed entyvio vs stelara.  We discussed repeat colonoscopy by the end of the year to determine if she is in remission on entyvio.  She is wanting to become pregnant -- we discuss how important it is to be in remission prior to that.  I recommend colonoscopy sooner if she is wishing to become pregnant at the end of the year.  Ideally, I would like her to have been on entyvio for 6 months.        Immunosuppressed status  Drug monitoring labs with infusion every 8 weeks     Other orders  -     ondansetron (ZOFRAN) 4 MG tablet; Take 1 tablet (4 mg total) by mouth every 6 (six) hours as needed for Nausea (nausea).  Dispense: 10 tablet; Refill: 1      60 minutes spent with both face to face time and record review.  Records from [facility] reviewed including notes, labs, colonoscopies " and procedures.  [facility].      Follow up in about 3 months (around 10/24/2023).        Thank you so much for allowing me to participate in the care of Harper Grier MD

## 2023-07-25 LAB — CALPROTECTIN STL-MCNT: 145 MCG/G

## 2023-08-02 ENCOUNTER — INFUSION (OUTPATIENT)
Dept: INFUSION THERAPY | Facility: HOSPITAL | Age: 23
End: 2023-08-02
Attending: INTERNAL MEDICINE
Payer: COMMERCIAL

## 2023-08-02 VITALS
DIASTOLIC BLOOD PRESSURE: 76 MMHG | SYSTOLIC BLOOD PRESSURE: 120 MMHG | WEIGHT: 203.94 LBS | HEART RATE: 82 BPM | RESPIRATION RATE: 18 BRPM | HEIGHT: 67 IN | BODY MASS INDEX: 32.01 KG/M2 | TEMPERATURE: 98 F

## 2023-08-02 DIAGNOSIS — K50.818 CROHN'S DISEASE OF BOTH SMALL AND LARGE INTESTINE WITH OTHER COMPLICATION: Primary | ICD-10-CM

## 2023-08-02 LAB
ALBUMIN SERPL BCP-MCNC: 3.8 G/DL (ref 3.5–5.2)
ALP SERPL-CCNC: 71 U/L (ref 55–135)
ALT SERPL W/O P-5'-P-CCNC: 16 U/L (ref 10–44)
ANION GAP SERPL CALC-SCNC: 8 MMOL/L (ref 8–16)
AST SERPL-CCNC: 11 U/L (ref 10–40)
BASOPHILS # BLD AUTO: 0.03 K/UL (ref 0–0.2)
BASOPHILS NFR BLD: 0.3 % (ref 0–1.9)
BILIRUB SERPL-MCNC: 0.1 MG/DL (ref 0.1–1)
BUN SERPL-MCNC: 9 MG/DL (ref 6–20)
CALCIUM SERPL-MCNC: 9.4 MG/DL (ref 8.7–10.5)
CHLORIDE SERPL-SCNC: 106 MMOL/L (ref 95–110)
CO2 SERPL-SCNC: 24 MMOL/L (ref 23–29)
CREAT SERPL-MCNC: 0.9 MG/DL (ref 0.5–1.4)
DIFFERENTIAL METHOD: ABNORMAL
EOSINOPHIL # BLD AUTO: 0.1 K/UL (ref 0–0.5)
EOSINOPHIL NFR BLD: 1.2 % (ref 0–8)
ERYTHROCYTE [DISTWIDTH] IN BLOOD BY AUTOMATED COUNT: 14.9 % (ref 11.5–14.5)
EST. GFR  (NO RACE VARIABLE): >60 ML/MIN/1.73 M^2
GLUCOSE SERPL-MCNC: 101 MG/DL (ref 70–110)
HCT VFR BLD AUTO: 37.4 % (ref 37–48.5)
HGB BLD-MCNC: 12.1 G/DL (ref 12–16)
IMM GRANULOCYTES # BLD AUTO: 0.04 K/UL (ref 0–0.04)
IMM GRANULOCYTES NFR BLD AUTO: 0.4 % (ref 0–0.5)
LYMPHOCYTES # BLD AUTO: 2.8 K/UL (ref 1–4.8)
LYMPHOCYTES NFR BLD: 29.5 % (ref 18–48)
MCH RBC QN AUTO: 27.8 PG (ref 27–31)
MCHC RBC AUTO-ENTMCNC: 32.4 G/DL (ref 32–36)
MCV RBC AUTO: 86 FL (ref 82–98)
MONOCYTES # BLD AUTO: 0.8 K/UL (ref 0.3–1)
MONOCYTES NFR BLD: 8 % (ref 4–15)
NEUTROPHILS # BLD AUTO: 5.8 K/UL (ref 1.8–7.7)
NEUTROPHILS NFR BLD: 60.6 % (ref 38–73)
NRBC BLD-RTO: 0 /100 WBC
PLATELET # BLD AUTO: 408 K/UL (ref 150–450)
PMV BLD AUTO: 9.6 FL (ref 9.2–12.9)
POTASSIUM SERPL-SCNC: 3.8 MMOL/L (ref 3.5–5.1)
PROT SERPL-MCNC: 7.6 G/DL (ref 6–8.4)
RBC # BLD AUTO: 4.36 M/UL (ref 4–5.4)
SODIUM SERPL-SCNC: 138 MMOL/L (ref 136–145)
WBC # BLD AUTO: 9.51 K/UL (ref 3.9–12.7)

## 2023-08-02 PROCEDURE — A4216 STERILE WATER/SALINE, 10 ML: HCPCS | Mod: PN | Performed by: INTERNAL MEDICINE

## 2023-08-02 PROCEDURE — 96365 THER/PROPH/DIAG IV INF INIT: CPT | Mod: PN

## 2023-08-02 PROCEDURE — 85025 COMPLETE CBC W/AUTO DIFF WBC: CPT | Mod: PN | Performed by: INTERNAL MEDICINE

## 2023-08-02 PROCEDURE — 63600175 PHARM REV CODE 636 W HCPCS: Mod: JZ,JG,PN | Performed by: INTERNAL MEDICINE

## 2023-08-02 PROCEDURE — 25000003 PHARM REV CODE 250: Mod: PN | Performed by: INTERNAL MEDICINE

## 2023-08-02 PROCEDURE — 80053 COMPREHEN METABOLIC PANEL: CPT | Mod: PN | Performed by: INTERNAL MEDICINE

## 2023-08-02 RX ORDER — IPRATROPIUM BROMIDE AND ALBUTEROL SULFATE 2.5; .5 MG/3ML; MG/3ML
3 SOLUTION RESPIRATORY (INHALATION)
OUTPATIENT
Start: 2023-08-10

## 2023-08-02 RX ORDER — DIPHENHYDRAMINE HYDROCHLORIDE 50 MG/ML
25 INJECTION INTRAMUSCULAR; INTRAVENOUS
OUTPATIENT
Start: 2023-08-10

## 2023-08-02 RX ORDER — EPINEPHRINE 1 MG/ML
0.3 INJECTION, SOLUTION, CONCENTRATE INTRAVENOUS
OUTPATIENT
Start: 2023-08-10

## 2023-08-02 RX ORDER — SODIUM CHLORIDE 0.9 % (FLUSH) 0.9 %
10 SYRINGE (ML) INJECTION
OUTPATIENT
Start: 2023-08-10

## 2023-08-02 RX ORDER — METHYLPREDNISOLONE SOD SUCC 125 MG
40 VIAL (EA) INJECTION
OUTPATIENT
Start: 2023-08-10

## 2023-08-02 RX ORDER — ACETAMINOPHEN 325 MG/1
650 TABLET ORAL
OUTPATIENT
Start: 2023-08-10

## 2023-08-02 RX ORDER — HEPARIN 100 UNIT/ML
500 SYRINGE INTRAVENOUS
OUTPATIENT
Start: 2023-08-10

## 2023-08-02 RX ORDER — SODIUM CHLORIDE 0.9 % (FLUSH) 0.9 %
10 SYRINGE (ML) INJECTION
Status: DISCONTINUED | OUTPATIENT
Start: 2023-08-02 | End: 2023-08-02 | Stop reason: HOSPADM

## 2023-08-02 RX ADMIN — VEDOLIZUMAB 300 MG: 300 INJECTION, POWDER, LYOPHILIZED, FOR SOLUTION INTRAVENOUS at 03:08

## 2023-08-02 RX ADMIN — Medication 10 ML: at 02:08

## 2023-08-02 RX ADMIN — SODIUM CHLORIDE: 9 INJECTION, SOLUTION INTRAVENOUS at 02:08

## 2023-08-02 NOTE — PLAN OF CARE
Problem: Adult Inpatient Plan of Care  Goal: Plan of Care Review  8/2/2023 1700 by Rehana Deutsch RN  Outcome: Ongoing, Progressing  Flowsheets (Taken 8/2/2023 1600)  Plan of Care Reviewed With: patient   Pt tolerated her Entyvio infusion well, NAD. No new c/o voiced. Pt given a schedule and reviewed, pt verbalized understanding. Pt ambulated out of the clinic without difficulty.

## 2023-08-02 NOTE — PLAN OF CARE
Problem: Fatigue  Goal: Improved Activity Tolerance  Intervention: Promote Improved Energy  Flowsheets (Taken 8/2/2023 1505)  Fatigue Management:   activity schedule adjusted   frequent rest breaks encouraged   paced activity encouraged   fatigue-related activity identified  Sleep/Rest Enhancement:   relaxation techniques promoted   natural light exposure provided   regular sleep/rest pattern promoted  Activity Management:   Ambulated -L4   Ambulated in srinivasan - L4   Up in stretcher chair - L1     Problem: Adult Inpatient Plan of Care  Goal: Patient-Specific Goal (Individualized)  Outcome: Ongoing, Progressing  Flowsheets (Taken 8/2/2023 1505)  Anxieties, Fears or Concerns: none  Individualized Care Needs: recliner, pillow, warm blanket, dim lights

## 2023-08-24 ENCOUNTER — TELEPHONE (OUTPATIENT)
Dept: GASTROENTEROLOGY | Facility: CLINIC | Age: 23
End: 2023-08-24
Payer: COMMERCIAL

## 2023-08-24 NOTE — TELEPHONE ENCOUNTER
[facility]. Included supporting clinical documentation and insurance information . Attached referral to chart via media .    Alicia Bethea, PharmD , [facility]  Clinical Pharmacist Gastroenterology  [facility]

## 2023-09-05 ENCOUNTER — TELEPHONE (OUTPATIENT)
Dept: GASTROENTEROLOGY | Facility: CLINIC | Age: 23
End: 2023-09-05
Payer: COMMERCIAL

## 2023-09-05 DIAGNOSIS — K50.818 CROHN'S DISEASE OF BOTH SMALL AND LARGE INTESTINE WITH OTHER COMPLICATION: Primary | ICD-10-CM

## 2023-09-05 NOTE — TELEPHONE ENCOUNTER
Spoke to patient. She states that insurance is denying her Entyvio at [facility]. She is needing an outpatient infusion center. I informed her we referred her to [facility]. She asked for a closer facility. She agreed to be referred to [facility] in [place]. Referral faxed. Fax confirmation received.

## 2023-09-07 ENCOUNTER — TELEPHONE (OUTPATIENT)
Dept: GASTROENTEROLOGY | Facility: CLINIC | Age: 23
End: 2023-09-07
Payer: COMMERCIAL

## 2023-09-07 NOTE — TELEPHONE ENCOUNTER
Ariana from [facility] needs prescriber orders signed and the latest TB test. I faxed the signed prescriber order and lab results. Fax confirmation received.

## 2023-09-07 NOTE — TELEPHONE ENCOUNTER
----- Message from Ileana Benson MA sent at 9/7/2023  8:14 AM CDT -----  Contact: [id number]    ----- Message -----  From: Preeti Shepard  Sent: 9/7/2023   8:09 AM CDT  To: Marvel Andrew Staff    Type:  Patient Returning Call    Who Called:[facility]  Who Left Message for Patient:hang  Does the patient know what this is regarding?:pt   Would the patient rather a call back to a response via [facility]? Call back   Best Call Back Number:[phone]  Additional Information:

## 2023-09-12 ENCOUNTER — TELEPHONE (OUTPATIENT)
Dept: GASTROENTEROLOGY | Facility: CLINIC | Age: 23
End: 2023-09-12
Payer: COMMERCIAL

## 2023-09-18 ENCOUNTER — TELEPHONE (OUTPATIENT)
Dept: GASTROENTEROLOGY | Facility: CLINIC | Age: 23
End: 2023-09-18
Payer: COMMERCIAL

## 2023-09-18 RX ORDER — ERGOCALCIFEROL 1.25 MG/1
50000 CAPSULE ORAL
Qty: 4 CAPSULE | Refills: 20 | Status: SHIPPED | OUTPATIENT
Start: 2023-09-18 | End: 2024-01-09 | Stop reason: SDUPTHER

## 2023-09-18 RX ORDER — ONDANSETRON 4 MG/1
4 TABLET, FILM COATED ORAL EVERY 6 HOURS PRN
Qty: 10 TABLET | Refills: 1 | Status: SHIPPED | OUTPATIENT
Start: 2023-09-18 | End: 2024-01-09 | Stop reason: SDUPTHER

## 2023-09-18 NOTE — TELEPHONE ENCOUNTER
"Spoke to patient. She states that she has a history of esophagitis. She is starting to feel like there is a "scratchy golf ball" in her throat. She denies sick symptoms such as sore throat, running nose, congestion, and cough. She states she has been on Protonix 40 mg daily for 2 years and is unsure if it is working anymore. I informed her we may need to see her in clinic and/or do EGD. She verbalized understanding  "

## 2023-09-27 ENCOUNTER — PATIENT MESSAGE (OUTPATIENT)
Dept: GASTROENTEROLOGY | Facility: CLINIC | Age: 23
End: 2023-09-27
Payer: COMMERCIAL

## 2023-10-02 ENCOUNTER — PATIENT MESSAGE (OUTPATIENT)
Dept: GASTROENTEROLOGY | Facility: CLINIC | Age: 23
End: 2023-10-02
Payer: COMMERCIAL

## 2023-10-02 DIAGNOSIS — K21.9 GASTROESOPHAGEAL REFLUX DISEASE, UNSPECIFIED WHETHER ESOPHAGITIS PRESENT: Primary | ICD-10-CM

## 2023-10-05 ENCOUNTER — PATIENT MESSAGE (OUTPATIENT)
Dept: ENDOSCOPY | Facility: HOSPITAL | Age: 23
End: 2023-10-05
Payer: COMMERCIAL

## 2023-10-05 ENCOUNTER — PATIENT MESSAGE (OUTPATIENT)
Dept: GASTROENTEROLOGY | Facility: CLINIC | Age: 23
End: 2023-10-05
Payer: COMMERCIAL

## 2023-10-05 DIAGNOSIS — K50.818 CROHN'S DISEASE OF BOTH SMALL AND LARGE INTESTINE WITH OTHER COMPLICATION: Primary | ICD-10-CM

## 2023-10-05 NOTE — PROGRESS NOTES
Pt sent message saying that she thinks she is pregnant.  She is currently on entyvio, started in June 2023 because of active disease while on stelara (started by previous GI).      Recommendations:  - follow up with OB for confirmation of pregnancy  - check calprotectin now and can use to assess response to entyvio  - continue entyvio  - follow up in GI after seeing OB       Lorrie Grier MD  Gastroenterology

## 2023-10-16 ENCOUNTER — PATIENT MESSAGE (OUTPATIENT)
Dept: GASTROENTEROLOGY | Facility: CLINIC | Age: 23
End: 2023-10-16
Payer: COMMERCIAL

## 2023-10-17 ENCOUNTER — PATIENT MESSAGE (OUTPATIENT)
Dept: GASTROENTEROLOGY | Facility: CLINIC | Age: 23
End: 2023-10-17
Payer: COMMERCIAL

## 2023-10-23 ENCOUNTER — PATIENT MESSAGE (OUTPATIENT)
Dept: GASTROENTEROLOGY | Facility: CLINIC | Age: 23
End: 2023-10-23
Payer: COMMERCIAL

## 2023-11-13 ENCOUNTER — OFFICE VISIT (OUTPATIENT)
Dept: GASTROENTEROLOGY | Facility: CLINIC | Age: 23
End: 2023-11-13
Payer: COMMERCIAL

## 2023-11-13 VITALS
DIASTOLIC BLOOD PRESSURE: 77 MMHG | SYSTOLIC BLOOD PRESSURE: 115 MMHG | HEART RATE: 101 BPM | BODY MASS INDEX: 31.63 KG/M2 | HEIGHT: 67 IN | WEIGHT: 201.5 LBS

## 2023-11-13 DIAGNOSIS — K50.818 CROHN'S DISEASE OF BOTH SMALL AND LARGE INTESTINE WITH OTHER COMPLICATION: Primary | ICD-10-CM

## 2023-11-13 DIAGNOSIS — Z3A.10 10 WEEKS GESTATION OF PREGNANCY: ICD-10-CM

## 2023-11-13 PROCEDURE — 3078F PR MOST RECENT DIASTOLIC BLOOD PRESSURE < 80 MM HG: ICD-10-PCS | Mod: CPTII,S$GLB,, | Performed by: INTERNAL MEDICINE

## 2023-11-13 PROCEDURE — 3008F BODY MASS INDEX DOCD: CPT | Mod: CPTII,S$GLB,, | Performed by: INTERNAL MEDICINE

## 2023-11-13 PROCEDURE — 99999 PR PBB SHADOW E&M-EST. PATIENT-LVL III: CPT | Mod: PBBFAC,,, | Performed by: INTERNAL MEDICINE

## 2023-11-13 PROCEDURE — 3074F PR MOST RECENT SYSTOLIC BLOOD PRESSURE < 130 MM HG: ICD-10-PCS | Mod: CPTII,S$GLB,, | Performed by: INTERNAL MEDICINE

## 2023-11-13 PROCEDURE — 3078F DIAST BP <80 MM HG: CPT | Mod: CPTII,S$GLB,, | Performed by: INTERNAL MEDICINE

## 2023-11-13 PROCEDURE — 99214 OFFICE O/P EST MOD 30 MIN: CPT | Mod: S$GLB,,, | Performed by: INTERNAL MEDICINE

## 2023-11-13 PROCEDURE — 3074F SYST BP LT 130 MM HG: CPT | Mod: CPTII,S$GLB,, | Performed by: INTERNAL MEDICINE

## 2023-11-13 PROCEDURE — 3008F PR BODY MASS INDEX (BMI) DOCUMENTED: ICD-10-PCS | Mod: CPTII,S$GLB,, | Performed by: INTERNAL MEDICINE

## 2023-11-13 PROCEDURE — 99214 PR OFFICE/OUTPT VISIT, EST, LEVL IV, 30-39 MIN: ICD-10-PCS | Mod: S$GLB,,, | Performed by: INTERNAL MEDICINE

## 2023-11-13 PROCEDURE — 99999 PR PBB SHADOW E&M-EST. PATIENT-LVL III: ICD-10-PCS | Mod: PBBFAC,,, | Performed by: INTERNAL MEDICINE

## 2023-11-13 RX ORDER — PANTOPRAZOLE SODIUM 40 MG/1
40 TABLET, DELAYED RELEASE ORAL 2 TIMES DAILY
Qty: 90 TABLET | Refills: 3 | Status: SHIPPED | OUTPATIENT
Start: 2023-11-13 | End: 2024-01-09 | Stop reason: SDUPTHER

## 2023-11-13 NOTE — PROGRESS NOTES
Clinic Consult:  [facility] Consultation Note    Reason for Consult:  The primary encounter diagnosis was Crohn's disease of both small and large intestine with other complication. A diagnosis of 10 weeks gestation of pregnancy was also pertinent to this visit.    PCP: JUAN Baxter       HPI:  This is a 23 y.o. female here for follow up.      IBD History  - Type: crohn's disease  - Disease Location: small bowel and colon and stomach   - Phenotype: stricture   - Diagnosed: age 12;    - Surgeries related to IBD:    Ileocolonic resection (at [facility]) -- was on humira at that time  - Extra-intestinal Manifestations: oral aphthous ulcers; joint pain; erythema nodosum   - pregnancy  (on stelara, did well with pregnancy and delivery); c section      Current Medications  Entyvio (2023)     Past Medications  Stelara (started  - ): was bumped to every 4 weeks   Humira (she thinks antibodies)   Remicade (ineffective)   Prednisone (during initial years)     Drug and Antibody Levels   ?      Endoscopy Reports  2023: christy-terminal ileum normal, ulcerated anastomosis, ulceration in descending; patchy inflammation in sigmoid (at this time her GI switched her from stelara to entyvio)     Pertinent Imaging  none     Interval History  Pregnant -- 10 weeks     Has between 2-4 bms, most of the time watery but sometimes soft  Endorses urgency  Denies nocturnal bms, hematochezia.    Denies abdominal pain, except had one episode of intense ruq that radiated to back.  That resolved.   Denies nausea and vomiting, reports good appetite.      Has had acid reflux, on ppi bid and stopped carbonation.  This has helped.     Preventative Medicine     Immunizations  - Influenza: utd  - Pnemococcal: PCV-13: ; PSV-23   - Hepatitis A/B:   - Herpes Zoster: not done  - COVID-19: not done      Cancer Prevention   - Date of last pap smear: 2023   - Date of last skin cancer screenin         Bone Health  - Vitamin D level: 41  - Date of last DEXA      Therapy Related Testing  - Date of last TB testing: ?  - TPMT status: ?     Miscellaneous  - Vitamin B 12 level (if ileal disease or resection):   - Smoking status: no  - NSAID use: no  - History of C. Diff: yes - prior to surgery; once, hospitalized at [facility] and treated   - Family planning: would like to try this year      ROS:  CONSTITUTIONAL: Denies weight change,  fatigue, fevers, chills, night sweats.  EYES: No changes in vision.   ENT: No oral lesions or sore throat.  HEMATOLOGICAL/Lymph: Denies bleeding tendency, bruising tendency. No swellings or enlarged lymph nodes.  CARDIOVASCULAR: Denies chest pain, shortness of breath, orthopnea and edema.  RESPIRATORY: Denies cough, hemoptysis, dyspnea, and wheezing.  GI: See HPI.  : Denies dysuria and hematuria  MUSCULOSKELETAL: Denies joint pain or swelling, back pain and muscle pain.  SKIN: Denies rashes.  NEUROLOGIC: Denies headaches, seizures and numbness.  PSYCHIATRIC: Denies depression or anxiety.  ENDOCRINE: Denies heat or cold intolerance and excessive thirst or urination.    Medical History:   Past Medical History:   Diagnosis Date    COVID-19     Crohn's colitis     Hypertension     Proteinuria        Surgical History:  Past Surgical History:   Procedure Laterality Date    BOWEL RESECTION  2020    portion of large intestine removed     SECTION N/A 2021    Procedure:  SECTION;  Surgeon: Sonny Lundberg MD;  Location: [facility] L&D;  Service: OB/GYN;  Laterality: N/A;    COLONOSCOPY N/A 2023    Procedure: COLONOSCOPY;  Surgeon: John Conklin MD;  Location: [facility];  Service: Endoscopy;  Laterality: N/A;    ESOPHAGOGASTRODUODENOSCOPY N/A 2023    Procedure: EGD (ESOPHAGOGASTRODUODENOSCOPY);  Surgeon: John Conklin MD;  Location: [facility];  Service: Endoscopy;  Laterality: N/A;       Family History:   No family history on file.    Social History:  "  Social History     Tobacco Use    Smoking status: Never    Smokeless tobacco: Never   Substance Use Topics    Alcohol use: Never    Drug use: Never       Allergies: Reviewed    Home Medications:   Medication List with Changes/Refills   Current Medications    BUPROPION (WELLBUTRIN SR) 150 MG TBSR 12 HR TABLET    Take 150 mg by mouth 2 (two) times daily.    DICYCLOMINE (BENTYL) 10 MG CAPSULE    Take 10 mg by mouth every 6 (six) hours as needed.    ERGOCALCIFEROL (ERGOCALCIFEROL) 50,000 UNIT CAP    TAKE 1 CAPSULE BY MOUTH ONE TIME PER WEEK    ERGOCALCIFEROL, VITAMIN D2, 10 MCG (400 UNIT) TAB    Take 5,000 Units by mouth.    ESCITALOPRAM OXALATE (LEXAPRO) 20 MG TABLET    Take 20 mg by mouth once daily.    METFORMIN (GLUCOPHAGE) 500 MG TABLET    Take 500 mg by mouth 2 (two) times daily with meals.    ONDANSETRON (ZOFRAN) 4 MG TABLET    TAKE 1 TABLET (4 MG TOTAL) BY MOUTH EVERY 6 (SIX) HOURS AS NEEDED FOR NAUSEA (NAUSEA).   Changed and/or Refilled Medications    Modified Medication Previous Medication    PANTOPRAZOLE (PROTONIX) 40 MG TABLET pantoprazole (PROTONIX) 40 MG tablet       Take 1 tablet (40 mg total) by mouth 2 (two) times daily.    TAKE 1 TABLET BY MOUTH EVERY DAY         Physical Exam:  Vital Signs:  /77 (BP Location: Left arm, Patient Position: Sitting, BP Method: Medium (Automatic))   Pulse 101   Ht 5' 7" (1.702 m)   Wt 91.4 kg (201 lb 8 oz)   BMI 31.56 kg/m²   Body mass index is 31.56 kg/m².      GENERAL: No acute distress, A&Ox3  EYES: Anicteric, no pallor noted.  ENT: OP clear  NECK: Supple, no masses, no thyromegally.  CHEST: Equal breath sounds bilaterally, no wheezing.  CARDIOVASCULAR: Regular rate and rhythm. Murmurs, rubs and gallops absent.  ABDOMEN: soft, non-tender, non-distended, normal bowel sounds, no hepatosplenomegaly   EXTREMITIES: No clubbing, cyanosis or edema.  SKIN: Without lesion or erythema.  LYMPH: No cervical, axillary lymphadenopathy palpable.   NEUROLOGICAL: Grossly " normal, no asterixis present.    Labs: Pertinent labs reviewed.    Assessment and Plan:  Crohn's disease of both small and large intestine with other complication  She reports feeling well on entyvio, however, calprotectin elevated to 700s.  She is pregnant now.  Had a prior successful term pregnancy on stelara.  We had a long discussion about the importance of remission during pregnancy.  She was switched to entyvio by prior GI based on inflammation in colon on scope.  Pt says that she was taking NSAIDs at that time and was concerned about switching to entyvio.  She has not been rescoped since switch to entyvio (which was started June 2023).  We discussed different options today given her pregnancy.  She is feeling well but does have the elevated calprotecin.  We will continue with entyio and check calprotectin now.  It may be reasonable to follow calprotectin during pregnancy if she remains fairly asymptomatic.      -     Calprotectin, Stool; Future; Expected date: 11/13/2023    10 weeks gestation of pregnancy  She is followed by M    Other orders  -     pantoprazole (PROTONIX) 40 MG tablet; Take 1 tablet (40 mg total) by mouth 2 (two) times daily.  Dispense: 90 tablet; Refill: 3        Thank you so much for allowing me to participate in the care of Harper Grier MD

## 2023-11-15 ENCOUNTER — TELEPHONE (OUTPATIENT)
Dept: GASTROENTEROLOGY | Facility: CLINIC | Age: 23
End: 2023-11-15
Payer: COMMERCIAL

## 2023-11-15 ENCOUNTER — LAB VISIT (OUTPATIENT)
Dept: LAB | Facility: HOSPITAL | Age: 23
End: 2023-11-15
Attending: INTERNAL MEDICINE
Payer: COMMERCIAL

## 2023-11-15 DIAGNOSIS — K50.818 CROHN'S DISEASE OF BOTH SMALL AND LARGE INTESTINE WITH OTHER COMPLICATION: ICD-10-CM

## 2023-11-15 PROCEDURE — 83993 ASSAY FOR CALPROTECTIN FECAL: CPT | Performed by: INTERNAL MEDICINE

## 2023-11-15 NOTE — TELEPHONE ENCOUNTER
----- Message from Alanis Hsu sent at 11/15/2023  1:07 PM CST -----  Regarding: Pt Advice  Contact: Pt [phone]  Missed Callback         Pt returning callback from missed call. Requesting to speak with somebody in   office. Please call [phone]

## 2023-11-15 NOTE — TELEPHONE ENCOUNTER
----- Message from Eugenia Skaggs RN sent at 11/14/2023  4:16 PM CST -----  Regarding: FW: Appt Access  Contact: [phone]  I called, no answer.     ----- Message -----  From: Kate Dueñas  Sent: 11/14/2023   2:07 PM CST  To: Ulises Alvarez Staff  Subject: Appt Access                                      SCHEDULING/REQUEST    Appt Type:  NP    Date/Time Preference: 01/2024    Treating Provider: Ulises    Caller Name:  CAMPA RIVERA [[id number]]    Contact Preference:   [phone]    Comments/Notes:  Dr Lorrie Grier is her current GI, but she's leaving and recommended Dr Montgomery to pt.  No appts avail.  Pt is pregnant and has Crohn's disease.  Pt is requesting an appt in 01/2024.

## 2023-11-21 LAB — CALPROTECTIN STL-MCNT: 293.6 MCG/G

## 2023-12-14 ENCOUNTER — TELEPHONE (OUTPATIENT)
Dept: GASTROENTEROLOGY | Facility: CLINIC | Age: 23
End: 2023-12-14
Payer: COMMERCIAL

## 2023-12-14 ENCOUNTER — PATIENT MESSAGE (OUTPATIENT)
Dept: GASTROENTEROLOGY | Facility: CLINIC | Age: 23
End: 2023-12-14
Payer: COMMERCIAL

## 2023-12-14 NOTE — TELEPHONE ENCOUNTER
Pt transferred to direct line  - States has a copy of missing medical records & will get this to our office via e-mail or fax  - States she is pregnant & likely needs Entyvio frequency optimized per 11/2023 appt w/ Dr. Grier  - States she is only open to seeing Dr. Montgomery & no other IBD provider; reviewed Dr. Montgomery's next availability & pt insists she cannot wait that long to be seen though still will only schedule w/ Dr. Montgomery  - Once records received will review w/ IBD MD to determine plan of care going forward

## 2023-12-14 NOTE — TELEPHONE ENCOUNTER
----- Message from Giovanni Jacobo sent at 12/14/2023  1:57 PM CST -----  Regarding: Pt advice  Contact: [phone]  Pt returning callback from missed call. Requesting to speak with Litzy in Dr. Ulises Alvarez office. Please call.

## 2023-12-21 ENCOUNTER — TELEPHONE (OUTPATIENT)
Dept: GASTROENTEROLOGY | Facility: CLINIC | Age: 23
End: 2023-12-21
Payer: COMMERCIAL

## 2024-01-08 PROBLEM — O98.513 COVID-19 AFFECTING PREGNANCY IN THIRD TRIMESTER: Status: RESOLVED | Noted: 2021-08-02 | Resolved: 2024-01-08

## 2024-01-08 PROBLEM — M07.60 ENTEROPATHIC ARTHROPATHY: Status: ACTIVE | Noted: 2024-01-08

## 2024-01-08 PROBLEM — L52 ERYTHEMA NODOSUM: Status: ACTIVE | Noted: 2024-01-08

## 2024-01-08 PROBLEM — U07.1 COVID-19 AFFECTING PREGNANCY IN THIRD TRIMESTER: Status: RESOLVED | Noted: 2021-08-02 | Resolved: 2024-01-08

## 2024-01-08 PROCEDURE — 99358 PROLONG SERVICE W/O CONTACT: CPT | Mod: S$GLB,,, | Performed by: INTERNAL MEDICINE

## 2024-01-09 ENCOUNTER — OFFICE VISIT (OUTPATIENT)
Dept: GASTROENTEROLOGY | Facility: CLINIC | Age: 24
End: 2024-01-09
Payer: COMMERCIAL

## 2024-01-09 ENCOUNTER — CLINICAL SUPPORT (OUTPATIENT)
Dept: GASTROENTEROLOGY | Facility: CLINIC | Age: 24
End: 2024-01-09
Payer: COMMERCIAL

## 2024-01-09 ENCOUNTER — LAB VISIT (OUTPATIENT)
Dept: LAB | Facility: HOSPITAL | Age: 24
End: 2024-01-09
Attending: INTERNAL MEDICINE
Payer: COMMERCIAL

## 2024-01-09 DIAGNOSIS — K12.1 ORAL ULCER: ICD-10-CM

## 2024-01-09 DIAGNOSIS — Z79.899 IMMUNODEFICIENCY DUE TO LONG TERM IMMUNOSUPPRESSIVE DRUG THERAPY: Primary | ICD-10-CM

## 2024-01-09 DIAGNOSIS — K50.818 CROHN'S DISEASE OF BOTH SMALL AND LARGE INTESTINE WITH OTHER COMPLICATION: Primary | ICD-10-CM

## 2024-01-09 DIAGNOSIS — M07.60 ENTEROPATHIC ARTHROPATHY: ICD-10-CM

## 2024-01-09 DIAGNOSIS — T45.1X5A IMMUNODEFICIENCY DUE TO LONG TERM IMMUNOSUPPRESSIVE DRUG THERAPY: Primary | ICD-10-CM

## 2024-01-09 DIAGNOSIS — T45.1X5A IMMUNODEFICIENCY DUE TO LONG TERM IMMUNOSUPPRESSIVE DRUG THERAPY: ICD-10-CM

## 2024-01-09 DIAGNOSIS — Z86.69 HISTORY OF EPISCLERITIS: ICD-10-CM

## 2024-01-09 DIAGNOSIS — D84.821 IMMUNODEFICIENCY DUE TO LONG TERM IMMUNOSUPPRESSIVE DRUG THERAPY: Primary | ICD-10-CM

## 2024-01-09 DIAGNOSIS — K50.818 CROHN'S DISEASE OF BOTH SMALL AND LARGE INTESTINE WITH OTHER COMPLICATION: ICD-10-CM

## 2024-01-09 DIAGNOSIS — D84.821 IMMUNODEFICIENCY DUE TO LONG TERM IMMUNOSUPPRESSIVE DRUG THERAPY: ICD-10-CM

## 2024-01-09 DIAGNOSIS — L52 ERYTHEMA NODOSUM: ICD-10-CM

## 2024-01-09 DIAGNOSIS — R19.7 DIARRHEA, UNSPECIFIED TYPE: ICD-10-CM

## 2024-01-09 DIAGNOSIS — Z79.899 IMMUNODEFICIENCY DUE TO LONG TERM IMMUNOSUPPRESSIVE DRUG THERAPY: ICD-10-CM

## 2024-01-09 DIAGNOSIS — Z3A.18 18 WEEKS GESTATION OF PREGNANCY: ICD-10-CM

## 2024-01-09 LAB
ALBUMIN SERPL BCP-MCNC: 2.9 G/DL (ref 3.5–5.2)
ALP SERPL-CCNC: 62 U/L (ref 55–135)
ALT SERPL W/O P-5'-P-CCNC: 9 U/L (ref 10–44)
ANION GAP SERPL CALC-SCNC: 9 MMOL/L (ref 8–16)
AST SERPL-CCNC: 9 U/L (ref 10–40)
BASOPHILS # BLD AUTO: 0.03 K/UL (ref 0–0.2)
BASOPHILS NFR BLD: 0.3 % (ref 0–1.9)
BILIRUB SERPL-MCNC: 0.2 MG/DL (ref 0.1–1)
BUN SERPL-MCNC: 5 MG/DL (ref 6–20)
CALCIUM SERPL-MCNC: 9.4 MG/DL (ref 8.7–10.5)
CHLORIDE SERPL-SCNC: 104 MMOL/L (ref 95–110)
CO2 SERPL-SCNC: 24 MMOL/L (ref 23–29)
CREAT SERPL-MCNC: 0.5 MG/DL (ref 0.5–1.4)
CRP SERPL-MCNC: 34.5 MG/L (ref 0–8.2)
DIFFERENTIAL METHOD BLD: ABNORMAL
EOSINOPHIL # BLD AUTO: 0.1 K/UL (ref 0–0.5)
EOSINOPHIL NFR BLD: 0.8 % (ref 0–8)
ERYTHROCYTE [DISTWIDTH] IN BLOOD BY AUTOMATED COUNT: 14.1 % (ref 11.5–14.5)
EST. GFR  (NO RACE VARIABLE): >60 ML/MIN/1.73 M^2
GLUCOSE SERPL-MCNC: 88 MG/DL (ref 70–110)
HAV IGG SER QL IA: NORMAL
HBV CORE AB SERPL QL IA: NORMAL
HBV SURFACE AG SERPL QL IA: NORMAL
HCT VFR BLD AUTO: 36.3 % (ref 37–48.5)
HCV AB SERPL QL IA: NORMAL
HGB BLD-MCNC: 12.2 G/DL (ref 12–16)
HIV 1+2 AB+HIV1 P24 AG SERPL QL IA: NORMAL
IMM GRANULOCYTES # BLD AUTO: 0.07 K/UL (ref 0–0.04)
IMM GRANULOCYTES NFR BLD AUTO: 0.6 % (ref 0–0.5)
LYMPHOCYTES # BLD AUTO: 2.3 K/UL (ref 1–4.8)
LYMPHOCYTES NFR BLD: 21.4 % (ref 18–48)
MCH RBC QN AUTO: 28.8 PG (ref 27–31)
MCHC RBC AUTO-ENTMCNC: 33.6 G/DL (ref 32–36)
MCV RBC AUTO: 86 FL (ref 82–98)
MONOCYTES # BLD AUTO: 0.7 K/UL (ref 0.3–1)
MONOCYTES NFR BLD: 6.1 % (ref 4–15)
NEUTROPHILS # BLD AUTO: 7.7 K/UL (ref 1.8–7.7)
NEUTROPHILS NFR BLD: 70.8 % (ref 38–73)
NRBC BLD-RTO: 0 /100 WBC
PLATELET # BLD AUTO: 323 K/UL (ref 150–450)
PMV BLD AUTO: 10.2 FL (ref 9.2–12.9)
POTASSIUM SERPL-SCNC: 3.9 MMOL/L (ref 3.5–5.1)
PROT SERPL-MCNC: 7.5 G/DL (ref 6–8.4)
RBC # BLD AUTO: 4.23 M/UL (ref 4–5.4)
SODIUM SERPL-SCNC: 137 MMOL/L (ref 136–145)
VIT B12 SERPL-MCNC: 311 PG/ML (ref 210–950)
WBC # BLD AUTO: 10.84 K/UL (ref 3.9–12.7)

## 2024-01-09 PROCEDURE — 86706 HEP B SURFACE ANTIBODY: CPT | Performed by: INTERNAL MEDICINE

## 2024-01-09 PROCEDURE — 99215 OFFICE O/P EST HI 40 MIN: CPT | Mod: S$GLB,,, | Performed by: INTERNAL MEDICINE

## 2024-01-09 PROCEDURE — 86765 RUBEOLA ANTIBODY: CPT | Performed by: INTERNAL MEDICINE

## 2024-01-09 PROCEDURE — 84433 ASY THIOPURIN S-MTHYLTRNSFRS: CPT | Performed by: INTERNAL MEDICINE

## 2024-01-09 PROCEDURE — 86790 VIRUS ANTIBODY NOS: CPT | Performed by: INTERNAL MEDICINE

## 2024-01-09 PROCEDURE — 86480 TB TEST CELL IMMUN MEASURE: CPT | Performed by: INTERNAL MEDICINE

## 2024-01-09 PROCEDURE — 82607 VITAMIN B-12: CPT | Performed by: INTERNAL MEDICINE

## 2024-01-09 PROCEDURE — 3008F BODY MASS INDEX DOCD: CPT | Mod: CPTII,S$GLB,, | Performed by: INTERNAL MEDICINE

## 2024-01-09 PROCEDURE — 85025 COMPLETE CBC W/AUTO DIFF WBC: CPT | Performed by: INTERNAL MEDICINE

## 2024-01-09 PROCEDURE — 87389 HIV-1 AG W/HIV-1&-2 AB AG IA: CPT | Performed by: INTERNAL MEDICINE

## 2024-01-09 PROCEDURE — 80053 COMPREHEN METABOLIC PANEL: CPT | Performed by: INTERNAL MEDICINE

## 2024-01-09 PROCEDURE — 86735 MUMPS ANTIBODY: CPT | Performed by: INTERNAL MEDICINE

## 2024-01-09 PROCEDURE — 36415 COLL VENOUS BLD VENIPUNCTURE: CPT | Performed by: INTERNAL MEDICINE

## 2024-01-09 PROCEDURE — 86140 C-REACTIVE PROTEIN: CPT | Performed by: INTERNAL MEDICINE

## 2024-01-09 PROCEDURE — 3074F SYST BP LT 130 MM HG: CPT | Mod: CPTII,S$GLB,, | Performed by: INTERNAL MEDICINE

## 2024-01-09 PROCEDURE — 86803 HEPATITIS C AB TEST: CPT | Performed by: INTERNAL MEDICINE

## 2024-01-09 PROCEDURE — 86787 VARICELLA-ZOSTER ANTIBODY: CPT | Performed by: INTERNAL MEDICINE

## 2024-01-09 PROCEDURE — 86762 RUBELLA ANTIBODY: CPT | Performed by: INTERNAL MEDICINE

## 2024-01-09 PROCEDURE — 86704 HEP B CORE ANTIBODY TOTAL: CPT | Performed by: INTERNAL MEDICINE

## 2024-01-09 PROCEDURE — 3078F DIAST BP <80 MM HG: CPT | Mod: CPTII,S$GLB,, | Performed by: INTERNAL MEDICINE

## 2024-01-09 PROCEDURE — 87340 HEPATITIS B SURFACE AG IA: CPT | Performed by: INTERNAL MEDICINE

## 2024-01-09 RX ORDER — BUDESONIDE 2 MG/1
2 AEROSOL, FOAM RECTAL 2 TIMES DAILY
Qty: 133.6 G | Refills: 1 | Status: SHIPPED | OUTPATIENT
Start: 2024-01-09 | End: 2024-02-22 | Stop reason: SDUPTHER

## 2024-01-09 RX ORDER — PANTOPRAZOLE SODIUM 40 MG/1
40 TABLET, DELAYED RELEASE ORAL DAILY
Qty: 90 TABLET | Refills: 1 | Status: SHIPPED | OUTPATIENT
Start: 2024-01-09

## 2024-01-09 RX ORDER — ONDANSETRON 4 MG/1
4 TABLET, FILM COATED ORAL EVERY 6 HOURS PRN
Qty: 10 TABLET | Refills: 1 | Status: SHIPPED | OUTPATIENT
Start: 2024-01-09

## 2024-01-09 RX ORDER — ERGOCALCIFEROL 1.25 MG/1
50000 CAPSULE ORAL
Qty: 4 CAPSULE | Refills: 5 | Status: SHIPPED | OUTPATIENT
Start: 2024-01-09

## 2024-01-09 RX ORDER — BUPROPION HYDROCHLORIDE 300 MG/1
300 TABLET ORAL DAILY
COMMUNITY
Start: 2023-06-13

## 2024-01-09 NOTE — Clinical Note
RN to check in with patient in 2 weeks to see how uceris foam twice a day is going and depending on when started likely will repeat stool calprotectin in 1 month of start

## 2024-01-09 NOTE — PROGRESS NOTES
"[facility]  Inflammatory Bowel Disease  Pharmacy Note    TODAY'S VISIT DATE:  1/9/2024    Reason for visit: New patient      IBD MD: Ulises       Pertinent History:  IBD phenotype: Crohn's disease with ileo-transverse anastomosis (gastroduodenal, ileum, pancolonic S/P ileocolonic resection due to stricture)     Dispensing pharmacy/infusion center:  [facility]   Current therapy: Entyvio 300mg every 8 weeks (started 6/21/2023, LD 11/22, ND 1/18)  Stool calprotectin elevated in 7/2023 (145), 10/2023 (719), 11/2023 (293).   18 weeks pregnant with DIAMOND 6/12/24   Infusions switched to [facility] in 11/2023, no insurance changes recently     Therapeutic Drug Monitoring Labs:  None     Prior IBD Therapies:  Remicade 3199-4308  6MP 2013-8/2020  Humira 40 mg SC weekly   Stelara 90 mg SC q 4 weeks    Vaccinations:  No results found for: "HEPBSAB"  No results found for: "HEPBSURFABQU"  No results found for: "HEPAIGG"  No results found for: "VARICELLAZOS", "VARICELLAINT"  No results found for: "MUMPSIGGSCRE", "MUMPSIGGINTE"  No results found for: "RUBEOLAIGGAN", "RUBEOLAINTER"  There is no immunization history for the selected administration types on file for this patient.    Influenza: recommended yearly. Discussed and recommended to get at local pharmacy    PCV 20: discussed and recommended 5 years after last pneumonia vaccine. 2025  Tetanus (TdaP): booster recommended every 10 years  HPV: unsure if pt received in the past   Meningococcal: UTD  Hepatitis B: check immunity    Hepatitis A:  check immunity   MMR (live vaccine): check immunity    Chickenpox status/Varicella (live vaccine): check immunity  Shingrix: discussed and recommended after pregnancy  Covid:  discussed and recommended       All Medical History/Surgical History/Family History/Social History/Allergies have been reviewed and updated in EMR    Review of patient's allergies indicates:  No Known Allergies      Current Medications: "   Outpatient Medications Marked as Taking for the 1/9/24 encounter (Clinical Support) with IBD PHARMACIST   Medication Sig Dispense Refill    buPROPion (WELLBUTRIN XL) 300 MG 24 hr tablet Take 300 mg by mouth once daily.      ergocalciferol (ERGOCALCIFEROL) 50,000 unit Cap TAKE 1 CAPSULE BY MOUTH ONE TIME PER WEEK 4 capsule 20    ondansetron (ZOFRAN) 4 MG tablet TAKE 1 TABLET (4 MG TOTAL) BY MOUTH EVERY 6 (SIX) HOURS AS NEEDED FOR NAUSEA (NAUSEA). 10 tablet 1    pantoprazole (PROTONIX) 40 MG tablet Take 1 tablet (40 mg total) by mouth 2 (two) times daily. (Patient taking differently: Take 40 mg by mouth once daily.) 90 tablet 3       Reviewed all current medications including OTC, herbals and supplements.     Labs:   Lab Results   Component Value Date    HEPBSAG Non-reactive 09/01/2022     Lab Results   Component Value Date    TBGOLDPLUS Negative 09/01/2022     Lab Results   Component Value Date    AKWOMWUY40PN 41 03/23/2023    DYCJLJRE07 >2000 (H) 03/23/2023     Lab Results   Component Value Date    WBC 10.84 01/09/2024    HGB 12.2 01/09/2024    HCT 36.3 (L) 01/09/2024    MCV 86 01/09/2024     01/09/2024     Lab Results   Component Value Date    CREATININE 0.9 08/02/2023    ALBUMIN 3.8 08/02/2023    BILITOT 0.1 08/02/2023    ALKPHOS 71 08/02/2023    AST 11 08/02/2023    ALT 16 08/02/2023         Assessment/Plan:  Harper CAMPA is a 23 y.o. female that  has a past medical history of COVID-19, Crohn's colitis, Crohn's disease with ileo-transverse anastomosis (gastroduodenal, pancolonic, ileum S/P ileal/right colon resection due to stricture), Hypertension, and Proteinuria. Patient presents for an initial visit with Dr. Montgomery to establish care with IBD clinic. She was a previous patient of Dr. Grier in  and is now switching her care to us. Currently 18 weeks pregnant. Most recent stool yuliana was elevated while on entyvio 300mg IV every 8 weeks. Reports no significant difference since starting entyvio. Since pt  currently pregnant no plans to change treatment at this time until we can restage her disease. She is starting uceris foam today and will be monitoring stool calprotectin. Due to insurance barriers her ent[facility]vio infusions were switched to [facility] last year and she continues to receive infusions through them. Referred to see IBD pharmacist to recapture the prescriptions under Dr. Montgomery.      # Recommendations:  - Educated patient on mechanism of action, frequency and route of administration, onset of action, and safety profile of Entyvio  - After next dose, plan to change provider on entyvio orders to [facility].  - Script for vit D, pantoprazole and zofran pended for provider.  - Encouraged pt to practice proper hand hygiene considering increased risk of infection with biologics. Pt to make us aware if she ever experiences s/sx of an infection including fever, chills, URI symptoms or UTI symptoms.   - Labs to be updated today: CBC/CMP every 6 months; TB; Hep B repeat every year.  - Plan for drug levels before next dose.  - Discussed importance of immunizations considering the increased risk of infections. Recommended flu and covid booster now. Shingles 2 dose series to be given after pregnancy.  - Pt to avoid live vaccines and uncooked/ raw seafood such as raw oysters or sushi.   - Advised pt to use sun protection and get annual skin checks considering possible increased risk of skin cancer   - Recommended against use of NSAIDs including motrin,ibuprofen, advil, aleve etc   - Patient verbalized understanding instructions     Charmaine Ruiz, PharmD  IBD Clinical Pharmacist

## 2024-01-09 NOTE — PATIENT INSTRUCTIONS
Vedolizumab (Entyvio)    Class: Integrin Blocker - Biologic product    Mechanism of Action: blocks alpha-4 beta-7 which plays a role in the inflammatory process for Inflammatory Bowel Disease (IBD) and works specifically in the gut.    Dosage/ Route/ Frequency: Intravenous (IV) Infusion that will be performed at an infusion center   - Loading Dose: 300 mg IV on week 0, 2, and 6  - Maintenance Dose: 300 mg IV every 8 weeks  - 30-minute infusion    Please call us immediately if you develop any of the below problems:  Please notify us if you are treated with antibiotics or experience signs of infection (ie. fevers, chills, sores, etc) given we may need to hold your medication during this time.     Side effects (>3% or 3 in 100 people):   Upper respiratory infections including nasopharyngitis (runny nose/sore throat), bronchitis, sinusitis, flu  Fatigue, headaches, back pain, cough, rash, fever, extremity/joint pain are rare    Serious side effects:     Infusion related reactions: These reactions might occur with the first or subsequent infusions. Includes rash, increased blood pressure and heart rate, swelling of your lips, shortness of breath, flushing etc.      Serious infections: viral/bacterial and fungal. Make sure to practice good hand hygiene and get the recommended vaccinations.     Liver Injury: This medication can increase your liver enzymes and is usually reversible with stopping the medication, your labs will be monitored regularly     PML (Progressive multifocal leukoencephalopathy): 1 case in a patient with multiple risk factors for PML (ie, HIV, CD4 ct 300, prolonged prior and concurrent immunosuppression).  Please be aware symptoms may include progressive weakness on one side of the body or clumsiness of limbs, disturbance of vision, and changes in thinking, memory, and orientation leading to confusion and personality changes.     Labs/Monitoring:  Prior to starting this new agent, we will be checking  labs to determine the safety of taking this medication including baseline blood counts, liver and kidney function tests, TB test and viral hepatitis testing.   Once started on the therapy we will monitor your blood count and your liver and kidney function tests as needed following evidence-based guidelines. TB test and viral hepatitis tests will be repeated every year. If you have any risk of exposure or travel to high risk area please notify us so we can do this testing sooner. If indicated your provider may also choose to check drug levels to monitor or optimize your response to the medication.     Vaccine counseling:   Avoid live vaccines which include:  Intranasal Influenza LAIV4 (FluMist Quadrivalent)  Measles, Mumps, Rubella (MMR)  Rotavirus (RotaTeq, Rotarix)  Typhoid oral capsule (Vivotif)  Varicella (Varivax)  Smallpox (Vaccinia)  Yellow Fever (YF-Vax)  Adenovirus  Cholera (Vaxchora)    Avoid consumption of raw seafood such as oysters/sushi.

## 2024-01-09 NOTE — PATIENT INSTRUCTIONS
Instructions:  - labs today  - stool studies  - entyvio levels on 1/17 before your next dose  - meet with pharmacist today    - colonoscopy 7/2024  - uceris foam- after turning stool calprotectin then start twice a day for 2 weeks  - RN to check in with patient in about 2 weeks   - Avoid all NSAIDs:  ibuprofen (Advil, Motrin), naprosyn (Aleve), aspirin, BC powder, Goodie's powder, diclofenac (Voltaren)  - Tylenol (acetaminophen) safe in IBD   - Pap smears- yearly   - Yearly Skin exam--wear sun block and hats  - Use antibiotics with caution and only take if necessary, please inform us of any infections or need for antibiotics   - No live vaccines if your are immunosuppressed   - Please avoid raw seafood (such as raw oysters or raw sushi) if you are immunosuppressed

## 2024-01-09 NOTE — PROGRESS NOTES
[facility]          Inflammatory Bowel Disease          New Patient Note         TODAY'S VISIT DATE:  1/12/2024    Reason for Consult:    Chief Complaint   Patient presents with    Crohn's Disease     PCP: JUAN Baxter      Referring MD:   Aaareferral Self    History of Present Illness:  Harper CAMPA who is a 23 y.o. female seen today at the [facility] for Crohn's disease with pertinent past medical history including oral ulcers, episcleritis (6/2016), erythema nodosum (1403-3239), PCOS, osteopenia.  Patient was doing well until 4/2013 when she had diarrhea, N/V, wt loss, oral ulcers and underwent EGD and colonoscopy with finding c/w Crohn's disease (gastroduodenal, pan-colonic, ileum).  She was initially placed on corticosteroids and remicade from 4757-6888 and was a secondary nonresponder due to developing high abs despite adding 6MP in 2015 (noral TPM 30). In approximately 2014 or 2015 she had erythema nodosum LE and recalls prednisone helped symptoms and she had no recurrence. Even on remicade she feels like there was not a period of deep remission though likely a partial response. She was hospitalized in 2/2016 and underwent EGD significant for endoscopically linear erythema and white plaques in the entire esophagus with bx c/w mild acute esophagitis throughout the esophagus, HP neg moderate chronic active gastritis.  Colonoscopy significant for multiple perianal skin tags and active inflammation throughout the entire colon, ICV and ileum. She was discharged home with steroids (tapered off within 2 mos) and enteral nutrition through [facility]. In approximately 2015 she was not compliant with 6MP because difficult to swallow pills due to painful LAD in neck.  She was then started on Humira and continued 6MP 50 mg/d in 3/2016. In 6/2016 she was diagnosed with episcleritis treated with steroid eye drops. In 9/2016 she was optimized to humira 40 mg  SC weekly..  In 9/2016 pt started on budesonide 9 mg/d.  By 12/2016 her 6MP was decreased to 25 mg/d due to elevated 6TG 430. In 4/2017 EGD significanat for some focal gastric metaplasia on duodenal biopsies with moderate inflammation rectum, sigmoid, transverse, right colon and mild inflammation left colon. In 1/2017 calprotectin 891 and 2/2017 calprotectin 390. In 2/2017 MRE with diffuse inflammation in cecum and TI.   In 2/2017 humira levels 11.8/Abs neg.  In 3/2017 her 6TG 248/6MMP 2309, humira levels 11.6. In 8/2017 6mP changed from 50 mg/d to alternating 75 mg and 50 mg/d.  In 11/2017 calprotectin >1250 and in 5/2018 1030. In 6/2018 6TG 174, 6MMP 1847 and in 1/2019 6TG 287/6MMP 6350.  8/2018 humira 17.8. Overall she felt humira worked better than remicade and overall seemed to be close to remission.  In 6/2019 EGD normal except for pancreatic rest in the prepyloric area and colonoscopy normal with localized 4 mm inflammatory stricture at the transition zone from ascending to cecum though normal cecal tissue through the stricture.  Unable to evaluate ICV or ileum. In 6/2019 stool calprotectin 179.  MRE 7/2019 significant for circumferential wall thickening with transmural enhancement of the TI and cecum extending to the mid right colon.  Due to stricture she was referred to pediatric surgeon, Dr. Monteiro but surgery was deferred.  In 1/2020 colonoscopy significant for perianal skin tags with pan-colonic inflammation (right colon worse than left colon), previous stricture in proximal ascending improved, ICV stricture with ulceration and friability though unable to pass colonoscopy through stricture with TI inflammation.  In 1/2020 EGD significant for spontaneous antral granularity with oozing and inflammation/erythema of the fundus with normal duodenum. She met Dr. Daniels 2/2020 at which time she had normal BMs with nausea and intermittent vomiting q 2 weeks.  Colonoscopy 3/2020 significant for ulcerated Crohn's  disease with moderate stricture in the ascending colon not traversed and tatoo placed with distal colon with mild erythema without any ulcers and normal sigmoid and rectum. MRE 3/2020 significant for wall thickening of the distal ileum at the ICV with narrowing  with second 2.6 cm ileum with narrowing located 2.4 cm proximal to this area. In 2020 patient underwent laparoscopic right hemicolectomy (resection of TI, ccume, right colon) with surgical path c/w mild chronic active ileitis and colitis with transmural inflammation, focal granuloma, perineural inflammation.  Postoperatively she continued on Humira 40 mg SC weekly and 6MP alternating 50 mg/75 mg/d.  In 2020 EGD normal and colonoscopy significant for mild and patchy apthous ulcers in sigmoid colon with remainder of the colon normal with side to side ileocolonic anastomosis in the transverse colon and anastomosis ulcerated with blind end with ulcer and neoterminal ileum with >5 apthous ulcers c/w Rutgeert's score i2.  Fecal calprotectin 263 and CRP elevated at 1.6.  Humira with 6MP discontinued 2020.  She started stelara 9/10/20. Due to low stelara levels her dose was optimized to 90 mg SC q 4 weeks by 2021.  In 2021 fecal calprotectin 50 and CRP 1.9. She delivered health term baby by  21 and per notes prior to delivery she had slight elevation in her fecal calprotectin.  In 10/2021 EGD significant for LA Grade A esophagitis and colonoscopy significant for large perianal skin tags with side to side ileocolonic anastomosis in the transverse colon that is patent and 2 small ulcers at the ileotransverse anastomosis and normal neoterminal ileum. Dr[facility]um at [facility] patient to  2022 at which time she continued on stelara 90 mg SC q 4 weeks though mention of slightly elevated stool calprotectin 181. On 10/7/22 patient received reinduction with stelara IV and then continued on stelara 90 mg SC q 4 weeks.   In 3/2023 stool calprotectin 242 and CRP 21.  In 3/2023 she had wisdom teeth removed and was on intermittent NSAIDs.  On 5/12/23 colonoscopy significant for perianal skin tags, normal ileum with ileocolonic anastomotic ulceration, and erythematous/hemorrhagic/inflammed nodular ulcerated mucosa in rectum and sigmoid colon (bx rectum/sigmoid/descending with mild active inflammation). EGD 5/2023 significant for mild gastric fundus and antrum with inflammation (bx HP neg moderate chronic gastritis, rare non-necrotizing granuloma) and normal duodenum. In 6/21/23 patient started on entyvio and subsequently established care with Dr. Grier at [facility] in 7/2023. Stool calprotectin elevated in 7/2023 (145), 10/2023 (719), 11/2023 (293).     Patient is currently 18 weeks pregnant with DIAMOND 6/12/24. Her OB is Sonny Lundberg.  She continues on entyvio every 8 weeks (LD 11/22, ND 1/18).  She has intermittent upper abdominal cramping. About 2 mos ago had RUQ pain woke her up in the middle of the night and radiated to right upper back and after 2 hours resolved.  She reports average of 2-3 BMs/d, loose to watery, mucous with streaks of blood and also blood on toilet paper.  Her perianal skin tags are irritated and doing sitz baths. Oral ulcers which were active 3 weeks and now resolved. No recurrent episcleritis and last eye exam 9/2022.  Had her last skin exam 8/2023 and last pap smear normal 1/2023.     Prior Pertinent Surgeries:   5/2020 laparoscopic right hemicolectomy (resection of TI, ccume, right colon) with surgical path c/w mild chronic active ileitis and colitis with transmural inflammation, focal granuloma, perineural inflammation    Last pertinent Endoscopy/Imaging:  3/12/20 MRE: wall thickening of the distal ileum at the level of the ileocecal valve with narrowing of the lumen, with the lumen measuring up to 3 mm. Approximately 2.4 cm proximal to this area is an additional area of circumferential wall thickening  "measuring approximately 2.6 cm in length with luminal narrowing measuring as narrow as 3 mm. Abnormal motion and lack of peristalsis is noted on the cine images within this area. Restricted diffusion is noted within the bowel wall within this area with minimal perienteric edema   5/12/23 colonoscopy:  perianal skin tags, normal ileum with ileocolonic anastomotic ulceration, and erythematous/hemorrhagic/inflammed nodular ulcerated mucosa in rectum and sigmoid colon (bx rectum/sigmoid/descending with mild active inflammation)  5/12/23 EGD:  mild gastric fundus and antrum with inflammation (bx HP neg moderate chronic gastritis, rare non-necrotizing granuloma) and normal duodenum.    Therapeutic Drug Monitoring Labs:  12/2016 6TG 430  2/2017 humira levels 11.8/Abs neg  3/2017 her 6TG 248/6MMP 2309, humira levels 11.6  6/2018 6TG 174, 6MMP 1847   1/2019 6TG 287/6MMP 6350  8/2018 humira 17.8.    Prior IBD Therapies:  Entocort- 2016  6MP 7408-1070 (12/2016 25 mg/d, 8/2017 alternating 50 mg and 75 mg/d, stopped 9/2020)  Remicade 9243-1244- secondary nonresponder due to antibodies  Humira 40 mg SC weekly (3/2016-9/2020, weekly 9/2016)- secondary nonresponder  Stelara 90 mg SC q 4 weeks (started 9/10/20, 90 mg SC q 4 weeks Feb/March 2021)- effective and possibly had inflammation on scope due to NSAIDs    Current IBD Therapies:  Entyvio 300 mg IV every 8 weeks (started 6/2023)    Vaccinations:  No results found for: "HEPBSAB"  Lab Results   Component Value Date    HEPBSURFABQU POSITIVE 01/09/2024    HEPBSURFABQU 68 01/09/2024     Lab Results   Component Value Date    HEPAIGG Non-reactive 01/09/2024     Lab Results   Component Value Date    VARICELLAZOS 77.38 01/09/2024    VARICELLAINT Negative 01/09/2024     There is no immunization history for the selected administration types on file for this patient.  Flu shot: recommended yearly   COVID vaccine/booster:  per CDC recommendations  Tetanus (Tdap):  1/10/2012  Prevnar 13:  " 20  Pneumovax 23:  2020  PPSV 20: due 2025  HPV:  not sure  Meningococcal: not sure  Hepatitis B: immune per labs 21, will check immunity     Hepatitis A:  will check immunity     MMR (live vaccine): will check immunity          Chickenpox status/Varicella (live vaccine):  will check immunity     Shingrix: recommend after delivery    Review of Systems   Constitutional:  Negative for chills, fever and weight loss.   HENT:          +oral ulcers   Eyes:  Negative for blurred vision, pain and redness.   Respiratory:  Negative for cough and shortness of breath.    Cardiovascular:  Negative for chest pain.   Gastrointestinal:  Positive for nausea. Negative for abdominal pain, heartburn and vomiting.   Genitourinary:  Negative for dysuria and hematuria.   Musculoskeletal:  Negative for back pain and joint pain.   Skin:  Negative for rash.   Psychiatric/Behavioral:  Negative for depression. The patient is not nervous/anxious and does not have insomnia.      Medical/Surgical History:   Past Medical History:   Diagnosis Date    Crohn's disease with ileo-transverse anastomosis (gastroduodenal, pancolonic, ileum S/P ileal/right colon resection due to stricture)     History of episcleritis 2024    History of Erythema nodosum     Hypertension     Immunodeficiency due to long term immunosuppressive drug therapy     Oral ulcer      Past Surgical History:   Procedure Laterality Date    BOWEL RESECTION  2020    portion of large intestine removed     SECTION N/A 2021    Procedure:  SECTION;  Surgeon: Sonny Lundberg MD;  Location: [facility] [place] [facility];  Service: OB/GYN;  Laterality: N/A;    COLONOSCOPY N/A 2023    Procedure: COLONOSCOPY;  Surgeon: John Conklin MD;  Location: [facility];  Service: Endoscopy;  Laterality: N/A;    ESOPHAGOGASTRODUODENOSCOPY N/A 2023    Procedure: EGD (ESOPHAGOGASTRODUODENOSCOPY);  Surgeon: John Conklin MD;  Location: [facility];  Service: Endoscopy;   "Laterality: N/A;     Family History:   family history includes Diabetes type II in her paternal aunt, paternal aunt, paternal grandfather, and paternal grandmother.     Social History:    reports that she has never smoked. She has never used smokeless tobacco. She reports that she does not drink alcohol and does not use drugs.     Review of patient's allergies indicates:  No Known Allergies    Current Medications:   Outpatient Medications Marked as Taking for the 1/9/24 encounter (Office Visit) with Antonio Montgomery MD   Medication Sig Dispense Refill    buPROPion (WELLBUTRIN XL) 300 MG 24 hr tablet Take 300 mg by mouth once daily.      dicyclomine (BENTYL) 10 MG capsule Take 10 mg by mouth every 6 (six) hours as needed.      [DISCONTINUED] ergocalciferol (ERGOCALCIFEROL) 50,000 unit Cap TAKE 1 CAPSULE BY MOUTH ONE TIME PER WEEK 4 capsule 20    [DISCONTINUED] ondansetron (ZOFRAN) 4 MG tablet TAKE 1 TABLET (4 MG TOTAL) BY MOUTH EVERY 6 (SIX) HOURS AS NEEDED FOR NAUSEA (NAUSEA). 10 tablet 1    [DISCONTINUED] pantoprazole (PROTONIX) 40 MG tablet Take 1 tablet (40 mg total) by mouth 2 (two) times daily. (Patient taking differently: Take 40 mg by mouth once daily.) 90 tablet 3      Vital Signs:  /74 (BP Location: Right arm, Patient Position: Sitting)   Pulse (!) 122   Temp 97 °F (36.1 °C)   Ht 5' 7" (1.702 m)   Wt 89.9 kg (198 lb 3.1 oz)   BMI 31.04 kg/m²    Physical Exam  Constitutional:       General: She is not in acute distress.  Cardiovascular:      Rate and Rhythm: Normal rate and regular rhythm.      Pulses: Normal pulses.      Heart sounds: Normal heart sounds. No murmur heard.  Pulmonary:      Effort: Pulmonary effort is normal.      Breath sounds: Normal breath sounds.   Abdominal:      General: Bowel sounds are normal. There is no distension.      Palpations: Abdomen is soft.      Tenderness: There is no abdominal tenderness.     Labs: Reviewed  Lab Results   Component Value Date    CRP 34.5 (H) " 01/09/2024    CALPROTECTIN 293.6 (H) 11/15/2023     Lab Results   Component Value Date    HEPBSAG Non-reactive 01/09/2024    HEPBCAB Non-reactive 01/09/2024     Lab Results   Component Value Date    TBGOLDPLUS Negative 01/09/2024     Lab Results   Component Value Date    YBLMRHLX61JJ 41 03/23/2023    MRXNDJCG16 311 01/09/2024     Lab Results   Component Value Date    WBC 10.84 01/09/2024    HGB 12.2 01/09/2024    HCT 36.3 (L) 01/09/2024    MCV 86 01/09/2024     01/09/2024     Lab Results   Component Value Date    CREATININE 0.5 01/09/2024    ALBUMIN 2.9 (L) 01/09/2024    BILITOT 0.2 01/09/2024    ALKPHOS 62 01/09/2024    AST 9 (L) 01/09/2024    ALT 9 (L) 01/09/2024     Assessment/Plan:  Harper CAMPA is a 23 y.o. female with Crohn's disease with ileo-transverse anastomosis (gastroduodenal, ileum, pancolonic S/P ileocolonic resection due to stricture) and history of EIMS including oral ulcers, erythema nodosum and episcleritis all of which are inactive. She is currently 18 weeks pregnant with DIAMOND 6/12/24.  She is currently having some loose/watery BMs so will proceed with stool studies. Once she turns in stool calprotectin while awaiting results will plan to start uceris foam.      # Crohn's disease with ileo-transverse anastomosis (gastroduodenal, ileum, pancolonic S/P ileocolonic resection due to stricture):  - I had a long discussion with patient regarding epidemiology, potential etiologies, associations and triggers (avoiding NSAIDS, using antibiotics with caution,stress and smoking effects on disease state), diagnosis, management goals and treatment options   - diarrhea- stool culture, C diff, calprotectin  - colonoscopy after delivery 7/2024  - continue entyvio q 8 weeks  - uceris foam- after turning stool calprotectin then start twice a day for 2 weeks  - pt to meet with pharmacist today to discuss  - smoking status: never smoked  - basic labs: CBC, CMP, CRP, HCV Ab, HIV, vitamin B12, TSH, TTG  IgA/total serum IgA  - drug monitoring labs: TPMT, TB quantiferon, Hep B testing (HBsAg, HBtotalcoreAb)  - TDM: trough VDZ levels/abs 24    # Pregnancy:  - no live vaccines for  for first 6 mos of life  - entyvio safe during pregnancy and during breast feeding  - will time entyvio accordingly with delivery date    # IBD specific health maintenance:  CRC risk-  Skin exam- recommend using sunblock/hats/sunprotective clothing and yearly skin exams  Risk for osteopenia/osteoporosis-  Pap smear- yearly   Vitamin D- normal 3/2023  Vaccines- no live vaccines, will check immunity to hep A, B, varicella and MMR    Follow up in about 4 months (around 2024) for in person.    Thank you again for sending Harper CAMPA to see Dr. Antonio Montgomery today at the [facility]. Please don't hesitate to contact Dr. Montgomery if there are any questions regarding this evaluation, or if you have any other patients with inflammatory bowel disease for whom you would like a consultation. You can reach Dr. Montgomery at [phone] or by email at [email or web address]    Antonio Montgomery MD  Department of Gastroenterology  Medical Director, Inflammatory Bowel Disease    Addendum  C diff positive. Started oral vancomycin for 10 days 24.  Took 3 doses of uceris foam but told to hold this for now until C diff treated    ERWIN Montgomery MD

## 2024-01-10 LAB
RUBV IGG SER-ACNC: 18.3 IU/ML
RUBV IGG SER-IMP: REACTIVE

## 2024-01-11 ENCOUNTER — LAB VISIT (OUTPATIENT)
Dept: LAB | Facility: HOSPITAL | Age: 24
End: 2024-01-11
Attending: INTERNAL MEDICINE
Payer: COMMERCIAL

## 2024-01-11 DIAGNOSIS — K50.818 CROHN'S DISEASE OF BOTH SMALL AND LARGE INTESTINE WITH OTHER COMPLICATION: ICD-10-CM

## 2024-01-11 DIAGNOSIS — T45.1X5A IMMUNODEFICIENCY DUE TO LONG TERM IMMUNOSUPPRESSIVE DRUG THERAPY: ICD-10-CM

## 2024-01-11 DIAGNOSIS — R19.7 DIARRHEA, UNSPECIFIED TYPE: ICD-10-CM

## 2024-01-11 DIAGNOSIS — D84.821 IMMUNODEFICIENCY DUE TO LONG TERM IMMUNOSUPPRESSIVE DRUG THERAPY: ICD-10-CM

## 2024-01-11 DIAGNOSIS — Z79.899 IMMUNODEFICIENCY DUE TO LONG TERM IMMUNOSUPPRESSIVE DRUG THERAPY: ICD-10-CM

## 2024-01-11 LAB
C DIFF GDH STL QL: POSITIVE
C DIFF TOX A+B STL QL IA: NEGATIVE
C DIFF TOX GENS STL QL NAA+PROBE: POSITIVE
GAMMA INTERFERON BACKGROUND BLD IA-ACNC: 0.42 IU/ML
HBV SURFACE AB SER QL IA: POSITIVE
HBV SURFACE AB SERPL IA-ACNC: 68 MIU/ML
M TB IFN-G CD4+ BCKGRND COR BLD-ACNC: -0.22 IU/ML
M TB IFN-G CD4+ BCKGRND COR BLD-ACNC: -0.26 IU/ML
MITOGEN IGNF BCKGRD COR BLD-ACNC: 9.58 IU/ML
TB GOLD PLUS: NEGATIVE

## 2024-01-11 PROCEDURE — 87427 SHIGA-LIKE TOXIN AG IA: CPT | Performed by: INTERNAL MEDICINE

## 2024-01-11 PROCEDURE — 87449 NOS EACH ORGANISM AG IA: CPT | Mod: 91 | Performed by: INTERNAL MEDICINE

## 2024-01-11 PROCEDURE — 87046 STOOL CULTR AEROBIC BACT EA: CPT | Performed by: INTERNAL MEDICINE

## 2024-01-11 PROCEDURE — 87449 NOS EACH ORGANISM AG IA: CPT | Performed by: INTERNAL MEDICINE

## 2024-01-11 PROCEDURE — 87045 FECES CULTURE AEROBIC BACT: CPT | Performed by: INTERNAL MEDICINE

## 2024-01-11 PROCEDURE — 83993 ASSAY FOR CALPROTECTIN FECAL: CPT | Mod: 91 | Performed by: INTERNAL MEDICINE

## 2024-01-11 PROCEDURE — 87493 C DIFF AMPLIFIED PROBE: CPT | Performed by: INTERNAL MEDICINE

## 2024-01-12 ENCOUNTER — TELEPHONE (OUTPATIENT)
Dept: GASTROENTEROLOGY | Facility: CLINIC | Age: 24
End: 2024-01-12
Payer: COMMERCIAL

## 2024-01-12 VITALS
SYSTOLIC BLOOD PRESSURE: 123 MMHG | TEMPERATURE: 97 F | DIASTOLIC BLOOD PRESSURE: 74 MMHG | BODY MASS INDEX: 31.11 KG/M2 | HEIGHT: 67 IN | HEART RATE: 122 BPM | WEIGHT: 198.19 LBS

## 2024-01-12 DIAGNOSIS — A04.72 C. DIFFICILE DIARRHEA: Primary | ICD-10-CM

## 2024-01-12 DIAGNOSIS — K50.818 CROHN'S DISEASE OF BOTH SMALL AND LARGE INTESTINE WITH OTHER COMPLICATION: ICD-10-CM

## 2024-01-12 PROBLEM — Z79.899 IMMUNODEFICIENCY DUE TO LONG TERM IMMUNOSUPPRESSIVE DRUG THERAPY: Status: ACTIVE | Noted: 2024-01-12

## 2024-01-12 PROBLEM — D84.821 IMMUNODEFICIENCY DUE TO LONG TERM IMMUNOSUPPRESSIVE DRUG THERAPY: Status: ACTIVE | Noted: 2024-01-12

## 2024-01-12 PROBLEM — Z79.60 IMMUNODEFICIENCY DUE TO LONG TERM IMMUNOSUPPRESSIVE DRUG THERAPY: Status: ACTIVE | Noted: 2024-01-12

## 2024-01-12 PROBLEM — Z86.69 HISTORY OF EPISCLERITIS: Status: ACTIVE | Noted: 2024-01-12

## 2024-01-12 PROBLEM — T45.1X5A IMMUNODEFICIENCY DUE TO LONG TERM IMMUNOSUPPRESSIVE DRUG THERAPY: Status: ACTIVE | Noted: 2024-01-12

## 2024-01-12 LAB
E COLI SXT1 STL QL IA: NEGATIVE
E COLI SXT2 STL QL IA: NEGATIVE
MUMPS IGG INTERPRETATION: POSITIVE
MUMPS IGG SCREEN: 63 AU/ML
RUBEOLA IGG ANTIBODY: >300 AU/ML
RUBEOLA INTERPRETATION: POSITIVE
VARICELLA INTERPRETATION: NEGATIVE
VARICELLA ZOSTER IGG: 77.38 AU/ML

## 2024-01-12 RX ORDER — VANCOMYCIN HYDROCHLORIDE 125 MG/1
125 CAPSULE ORAL 4 TIMES DAILY
Qty: 40 CAPSULE | Refills: 0 | Status: SHIPPED | OUTPATIENT
Start: 2024-01-12 | End: 2024-01-22

## 2024-01-12 NOTE — PROGRESS NOTES
I spent 35 minutes on 1/8/24 reviewing Harper CAMPA medical records prior to clinic visit on 1/9/24.

## 2024-01-12 NOTE — TELEPHONE ENCOUNTER
Spoke with Harper:  - per Dr. Montgomery - needs to postpone Entyvio until after antibiotic is complete. If continued diarrhea, need to recheck stool for C.dif. Must be cleared prior to next infusion. Advised to schedule 1/24 - 1/26.  - reviewed her current medical condition is a private matter and doesn't need to be disclosed to her work. She states she's very close with her manager and told her so she needs a letter stating she can be at work  - states understanding and has no further questions/concerns

## 2024-01-12 NOTE — TELEPHONE ENCOUNTER
Spoke with Harper:  IBD meds:  - Entyvio q8w, LD: 11/22/23, ND: 1/18/24  - Uceris foam BID x 2w then nightly x 4 w, started 1/9/24    - reviewed +C.dif, tx, and possible f/u testing after tx  - reviewed precautions - hand hygiene, showering, stop anti-diarreals (took 2 within the last month), not taking any anticholinergic medications  - encouraged her to monitor her  and any other household members for sxs r/t C.dif and contact their PCP if sxs develop

## 2024-01-12 NOTE — TELEPHONE ENCOUNTER
Spoke with Harper:  - advised ok to work as long as practicing good hand hygiene  - may need to postpone next Entyvio infusion d/t antibiotic therapy, will advise after discussing with Dr. Montgomery

## 2024-01-12 NOTE — LETTER
January 12, 2024    Harper CAMPA  [address]  [place] [address]             [facility]Inflammatory [facility] Disease  [address]  [facility] [address]  Phone: [phone]  Fax: [phone] Patient: Harper CAMPA   YOB: 2000  Date of Visit: 01/12/2024    To Whom It May Concern:    Mc CAMPA  was at [facility] on 01/11/2024. The patient may return to work on 01/12/2024 with no restrictions. If you have any questions or concerns, or if I can be of further assistance, please do not hesitate to contact me.    Sincerely,        Antonio Montgomery MD   Department of Gastroenterology  Medical Director, Inflammatory Bowel Disease

## 2024-01-12 NOTE — TELEPHONE ENCOUNTER
----- Message from Antonio Montgomery MD sent at 1/12/2024  7:54 AM CST -----  C diff positive  Call pt discuss precautions  She is pregnant  Vancomycin 125 mg po QID is safe during pregnancy and give 10 day course please

## 2024-01-13 LAB — BACTERIA STL CULT: NORMAL

## 2024-01-15 LAB
6-METHYLMERCAPTOPURINE RIBOSIDE: 9.83 NMOL/ML/H (ref 5.04–9.57)
6-METHYLMERCAPTOPURINE: 5.03 NMOL/ML/H (ref 3–6.66)
6-METHYLTHIOGUANINE RIBOSIDE: 3.13 NMOL/ML/H (ref 2.7–5.84)
TPMT INTERPRETATION: ABNORMAL
TPMT REVIEWED BY: ABNORMAL

## 2024-01-18 LAB — CALPROTECTIN STL-MCNT: 384.9 MCG/G

## 2024-01-22 ENCOUNTER — TELEPHONE (OUTPATIENT)
Dept: GASTROENTEROLOGY | Facility: CLINIC | Age: 24
End: 2024-01-22
Payer: COMMERCIAL

## 2024-01-22 DIAGNOSIS — K50.818 CROHN'S DISEASE OF BOTH SMALL AND LARGE INTESTINE WITH OTHER COMPLICATION: Primary | ICD-10-CM

## 2024-01-22 NOTE — TELEPHONE ENCOUNTER
"Spoke with Harper:  - last dose of Vancomycin tonight  - Entyvio is scheduled for 1/29, was due 1/18  - reports little to no change in bowels since prior to Vanco  - 1-2 BM/d, some formed, some loose  - states, "I didn't have any of the normal sxs of C.dif so I was surprised the test was positive."    Dr. Montgomery will be updated for recommendation.  "

## 2024-01-22 NOTE — TELEPHONE ENCOUNTER
Spoke with Harper:  - agrees to stool yuliana per Dr. Montgomery's request as this may have been influenced by the C.dif

## 2024-01-22 NOTE — TELEPHONE ENCOUNTER
----- Message from Eugenia Skaggs, RN sent at 1/12/2024 12:08 PM CST -----  C.dif, complete Vanco 1/22, Entyvio was due 1/18, assess for sxs - if continued diarrhea must repeat C.dif prior to Entyvio per Dr. Montgomery

## 2024-01-23 ENCOUNTER — LAB VISIT (OUTPATIENT)
Dept: LAB | Facility: HOSPITAL | Age: 24
End: 2024-01-23
Attending: INTERNAL MEDICINE
Payer: COMMERCIAL

## 2024-01-23 ENCOUNTER — TELEPHONE (OUTPATIENT)
Dept: GASTROENTEROLOGY | Facility: CLINIC | Age: 24
End: 2024-01-23
Payer: COMMERCIAL

## 2024-01-23 DIAGNOSIS — K50.818 CROHN'S DISEASE OF BOTH SMALL AND LARGE INTESTINE WITH OTHER COMPLICATION: Primary | ICD-10-CM

## 2024-01-23 DIAGNOSIS — K50.818 CROHN'S DISEASE OF BOTH SMALL AND LARGE INTESTINE WITH OTHER COMPLICATION: ICD-10-CM

## 2024-01-23 PROCEDURE — 83993 ASSAY FOR CALPROTECTIN FECAL: CPT | Performed by: INTERNAL MEDICINE

## 2024-01-23 NOTE — TELEPHONE ENCOUNTER
Spoke with Harper:  - she states she stopped Uceris foam when she was dxd w C.dif    Per Dr. Montgomery:  - start Uceris BID x 2 wks, then go to HS only  - stool yuliana in 2 wks

## 2024-01-29 ENCOUNTER — PATIENT MESSAGE (OUTPATIENT)
Dept: GASTROENTEROLOGY | Facility: CLINIC | Age: 24
End: 2024-01-29
Payer: COMMERCIAL

## 2024-01-29 LAB — CALPROTECTIN STL-MCNT: 379.2 MCG/G

## 2024-02-03 DIAGNOSIS — K50.818 CROHN'S DISEASE OF BOTH SMALL AND LARGE INTESTINE WITH OTHER COMPLICATION: Primary | ICD-10-CM

## 2024-02-06 ENCOUNTER — TELEPHONE (OUTPATIENT)
Dept: GASTROENTEROLOGY | Facility: CLINIC | Age: 24
End: 2024-02-06
Payer: COMMERCIAL

## 2024-02-06 NOTE — TELEPHONE ENCOUNTER
Spoke Shira   - 1/9/24 I saw her and she was having 2-3 BMs/d loose to watery with mucuous ans treaks of blood  1/11/24 C diff Ag positive, toxin, neg, pcr + and took 10 days of oral vancomycin and after completion of treatment she noticed stool rarely about 2-3 times/week is loos but otherwise soft daily.   Stool calprotectin:  11/15 293.6, 1/11/24 384, 1/23/24 379.2  Uceris foam BID started 1/23 and today is 2 weeks  Current symptoms 1-2 soft and rarely 3 BMs/d and 2-3 times/week has loose stool. Noticed blood streaks with mucus in all BMs and blood clots in toilet bowl at times she attributes to hemorrhoids  Pregnant DIAMOND 6/12/23, currently 22 weeks pregnant  Next appt 5/2024    Plan  - continue entyvio every 8 weeks  - discussed continuing uceris foam BID until return of next stool test  - will turn in stool calprotectin around 1 mo of uceris foam BID which will be around 2/20. Encouraged pt [facility]  [facility]

## 2024-02-06 NOTE — TELEPHONE ENCOUNTER
----- Message from Eugenia Skaggs RN sent at 1/23/2024  5:05 PM CST -----  Uceris foam x 2 wks, turn in stool yuliana (2/6), Uceris foam HS until results

## 2024-02-06 NOTE — TELEPHONE ENCOUNTER
"Spoke with Harper:  IBD meds:  - Entyvio q8w; LD: 1/29 (late from 1/18, +C.dif), ND: 3/14 (calculated from 1/18)  - Uceris foam daily (was BID until today)    2 wk Update - Uceris BID  - 1-2 BM/d, diarrhea to formed, + blood each stool for last wk, + mucous (no change), + false trips 2/w, abd pain - intermittent mid-upper abd, cramping (no trigger), 0-6/10, no alleviating/aggravating factors  - denies noc BM  - compared to 1 wk ago - feels the same, "weak" (pt feels this is r/t Crohn's and not pregnancy, unable to describe further)  - plan: turn in stool yuliana 2/20, continue Uceris daily until results    Dr. Montgomery will be updated for further recommendation.  "

## 2024-02-15 ENCOUNTER — PATIENT MESSAGE (OUTPATIENT)
Dept: GASTROENTEROLOGY | Facility: CLINIC | Age: 24
End: 2024-02-15
Payer: COMMERCIAL

## 2024-02-15 NOTE — LETTER
February 15, 2024    Harper CAMPA  [address]  [place] [address]             [facility]  [address]  [facility] [address]  Phone: [phone]  Fax: [phone] To Whom It May Concern:     Harper Campa?is unable to participate in jury duty due to her medical condition with Inflammatory Bowel disease. ?Any stress may increase her diarrhea and can potentially flare her disease.  Due to the nature of this lifelong disease future jury duty summons would not be appropriate.        If you have any questions or concerns, please feel free to call my office. Our number is [phone]      Sincerely,           Antonio Montgomery MD     Department of Gastroenterology  Medical Director, Inflammatory Bowel Disease

## 2024-02-20 ENCOUNTER — PATIENT MESSAGE (OUTPATIENT)
Dept: GASTROENTEROLOGY | Facility: CLINIC | Age: 24
End: 2024-02-20
Payer: COMMERCIAL

## 2024-02-20 NOTE — TELEPHONE ENCOUNTER
Spoke with Harper:  IBD meds:  - Entyvio q8w; LD: 1/29 (late from 1/18, +C.dif), ND: 3/14 (calculated from 1/18)  - Uceris foam BID    - Uceris foam BID started 1/23, plan repeat stool yuliana in 2 wks & reduce to once daily  - RN 2 wk check on 2/6, pt still symptomatic, it was decided to do another 2 wks of Uceris foam and then turn in stool yuliana after 1 mo of Uceris foam BID  - today is her last dose of Uceris as she has no more supply  - appt changed to 2/22 and will bring stool sample at that time    Current assessment:  - 1-2 BM/d, soft with some form at times, had diarrhea 2x in the last week, + mucous, + noc BM x1 in the last 2 wks, + false BR x 2 in last 2 wks  - mouth and throat sores are improving, today is the best she's felt in a while and is able to drink/eat with less pain, using Magic Mouthwash and [employer] Peroxyl  - denies blood and abd/rectal pain    Dr. Montgomery will be updated.

## 2024-02-22 ENCOUNTER — OFFICE VISIT (OUTPATIENT)
Dept: GASTROENTEROLOGY | Facility: CLINIC | Age: 24
End: 2024-02-22
Payer: COMMERCIAL

## 2024-02-22 ENCOUNTER — PATIENT MESSAGE (OUTPATIENT)
Dept: GASTROENTEROLOGY | Facility: CLINIC | Age: 24
End: 2024-02-22

## 2024-02-22 VITALS
SYSTOLIC BLOOD PRESSURE: 120 MMHG | BODY MASS INDEX: 30.63 KG/M2 | DIASTOLIC BLOOD PRESSURE: 79 MMHG | HEART RATE: 116 BPM | WEIGHT: 195.13 LBS | OXYGEN SATURATION: 98 % | HEIGHT: 67 IN | TEMPERATURE: 97 F

## 2024-02-22 DIAGNOSIS — Z3A.24 24 WEEKS GESTATION OF PREGNANCY: Primary | ICD-10-CM

## 2024-02-22 DIAGNOSIS — T45.1X5A IMMUNODEFICIENCY DUE TO LONG TERM IMMUNOSUPPRESSIVE DRUG THERAPY: ICD-10-CM

## 2024-02-22 DIAGNOSIS — K12.1 ORAL ULCER: ICD-10-CM

## 2024-02-22 DIAGNOSIS — D84.821 IMMUNODEFICIENCY DUE TO LONG TERM IMMUNOSUPPRESSIVE DRUG THERAPY: ICD-10-CM

## 2024-02-22 DIAGNOSIS — K50.818 CROHN'S DISEASE OF BOTH SMALL AND LARGE INTESTINE WITH OTHER COMPLICATION: ICD-10-CM

## 2024-02-22 DIAGNOSIS — Z86.69 HISTORY OF EPISCLERITIS: ICD-10-CM

## 2024-02-22 DIAGNOSIS — Z79.899 IMMUNODEFICIENCY DUE TO LONG TERM IMMUNOSUPPRESSIVE DRUG THERAPY: ICD-10-CM

## 2024-02-22 DIAGNOSIS — M07.60 ENTEROPATHIC ARTHROPATHY: ICD-10-CM

## 2024-02-22 DIAGNOSIS — L52 ERYTHEMA NODOSUM: ICD-10-CM

## 2024-02-22 PROCEDURE — 3078F DIAST BP <80 MM HG: CPT | Mod: CPTII,S$GLB,, | Performed by: INTERNAL MEDICINE

## 2024-02-22 PROCEDURE — 3008F BODY MASS INDEX DOCD: CPT | Mod: CPTII,S$GLB,, | Performed by: INTERNAL MEDICINE

## 2024-02-22 PROCEDURE — 3074F SYST BP LT 130 MM HG: CPT | Mod: CPTII,S$GLB,, | Performed by: INTERNAL MEDICINE

## 2024-02-22 PROCEDURE — G2211 COMPLEX E/M VISIT ADD ON: HCPCS | Mod: S$GLB,,, | Performed by: INTERNAL MEDICINE

## 2024-02-22 PROCEDURE — 99215 OFFICE O/P EST HI 40 MIN: CPT | Mod: S$GLB,,, | Performed by: INTERNAL MEDICINE

## 2024-02-22 PROCEDURE — 1160F RVW MEDS BY RX/DR IN RCRD: CPT | Mod: CPTII,S$GLB,, | Performed by: INTERNAL MEDICINE

## 2024-02-22 PROCEDURE — 1159F MED LIST DOCD IN RCRD: CPT | Mod: CPTII,S$GLB,, | Performed by: INTERNAL MEDICINE

## 2024-02-22 RX ORDER — VEDOLIZUMAB 300 MG/5ML
300 INJECTION, POWDER, LYOPHILIZED, FOR SOLUTION INTRAVENOUS
COMMUNITY

## 2024-02-22 RX ORDER — BUDESONIDE 2 MG/1
2 AEROSOL, FOAM RECTAL 2 TIMES DAILY
Qty: 133.6 G | Refills: 1 | Status: SHIPPED | OUTPATIENT
Start: 2024-02-22 | End: 2024-03-28

## 2024-02-22 NOTE — PROGRESS NOTES
[facility]             Inflammatory Bowel Disease   Follow-up  Note              TODAY'S VISIT DATE:  2/22/2024    Chief Complaint:   Chief Complaint   Patient presents with    Crohn's Disease     PCP: JUAN Baxter    Previous History:  Harper CAMPA is a 23 y.o. female with pertinent past medical history including oral ulcers, episcleritis (6/2016), erythema nodosum (8858-8443), PCOS, osteopenia.  Patient was doing well until 4/2013 when she had diarrhea, N/V, wt loss, oral ulcers and underwent EGD and colonoscopy with finding c/w Crohn's disease (gastroduodenal, pan-colonic, ileum).  She was initially placed on corticosteroids and remicade from 5494-7774 and was a secondary nonresponder due to developing high abs despite adding 6MP in 2015 (noral TPM 30). In approximately 2014 or 2015 she had erythema nodosum LE and recalls prednisone helped symptoms and she had no recurrence. Even on remicade she feels like there was not a period of deep remission though likely a partial response. She was hospitalized in 2/2016 and underwent EGD significant for endoscopically linear erythema and white plaques in the entire esophagus with bx c/w mild acute esophagitis throughout the esophagus, HP neg moderate chronic active gastritis.  Colonoscopy significant for multiple perianal skin tags and active inflammation throughout the entire colon, ICV and ileum. She was discharged home with steroids (tapered off within 2 mos) and enteral nutrition through NGT. In approximately 2015 she was not compliant with 6MP because difficult to swallow pills due to painful LAD in neck.  She was then started on Humira and continued 6MP 50 mg/d in 3/2016. In 6/2016 she was diagnosed with episcleritis treated with steroid eye drops. In 9/2016 she was optimized to humira 40 mg SC weekly..  In 9/2016 pt started on budesonide 9 mg/d.  By 12/2016 her 6MP was decreased to 25 mg/d due to elevated 6TG 430. In 4/2017 EGD  significanat for some focal gastric metaplasia on duodenal biopsies with moderate inflammation rectum, sigmoid, transverse, right colon and mild inflammation left colon. In 1/2017 calprotectin 891 and 2/2017 calprotectin 390. In 2/2017 MRE with diffuse inflammation in cecum and TI.   In 2/2017 humira levels 11.8/Abs neg.  In 3/2017 her 6TG 248/6MMP 2309, humira levels 11.6. In 8/2017 6mP changed from 50 mg/d to alternating 75 mg and 50 mg/d.  In 11/2017 calprotectin >1250 and in 5/2018 1030. In 6/2018 6TG 174, 6MMP 1847 and in 1/2019 6TG 287/6MMP 6350.  8/2018 humira 17.8. Overall she felt humira worked better than remicade and overall seemed to be close to remission.  In 6/2019 EGD normal except for pancreatic rest in the prepyloric area and colonoscopy normal with localized 4 mm inflammatory stricture at the transition zone from ascending to cecum though normal cecal tissue through the stricture.  Unable to evaluate ICV or ileum. In 6/2019 stool calprotectin 179.  MRE 7/2019 significant for circumferential wall thickening with transmural enhancement of the TI and cecum extending to the mid right colon.  Due to stricture she was referred to pediatric surgeon, Dr. Monteiro but surgery was deferred.  In 1/2020 colonoscopy significant for perianal skin tags with pan-colonic inflammation (right colon worse than left colon), previous stricture in proximal ascending improved, ICV stricture with ulceration and friability though unable to pass colonoscopy through stricture with TI inflammation.  In 1/2020 EGD significant for spontaneous antral granularity with oozing and inflammation/erythema of the fundus with normal duodenum. She met Dr. Daniels 2/2020 at which time she had normal BMs with nausea and intermittent vomiting q 2 weeks.  Colonoscopy 3/2020 significant for ulcerated Crohn's disease with moderate stricture in the ascending colon not traversed and tatoo placed with distal colon with mild erythema without any ulcers  and normal sigmoid and rectum. MRE 3/2020 significant for wall thickening of the distal ileum at the ICV with narrowing  with second 2.6 cm ileum with narrowing located 2.4 cm proximal to this area. In 2020 patient underwent laparoscopic right hemicolectomy (resection of TI, ccume, right colon) with surgical path c/w mild chronic active ileitis and colitis with transmural inflammation, focal granuloma, perineural inflammation.  Postoperatively she continued on Humira 40 mg SC weekly and 6MP alternating 50 mg/75 mg/d.  In 2020 EGD normal and colonoscopy significant for mild and patchy apthous ulcers in sigmoid colon with remainder of the colon normal with side to side ileocolonic anastomosis in the transverse colon and anastomosis ulcerated with blind end with ulcer and neoterminal ileum with >5 apthous ulcers c/w Rutgeert's score i2.  Fecal calprotectin 263 and CRP elevated at 1.6.  Humira with 6MP discontinued 2020.  She started stelara 9/10/20. Due to low stelara levels her dose was optimized to 90 mg SC q 4 weeks by 2021.  In 2021 fecal calprotectin 50 and CRP 1.9. She delivered health term baby by  21 and per notes prior to delivery she had slight elevation in her fecal calprotectin.  In 10/2021 EGD significant for LA Grade A esophagitis and colonoscopy significant for large perianal skin tags with side to side ileocolonic anastomosis in the transverse colon that is patent and 2 small ulcers at the ileotransverse anastomosis and normal neoterminal ileum. Dr. Marcum at [facility] referred patient to  2022 at which time she continued on stelara 90 mg SC q 4 weeks though mention of slightly elevated stool calprotectin 181. On 10/7/22 patient received reinduction with stelara IV and then continued on stelara 90 mg SC q 4 weeks.  In 3/2023 stool calprotectin 242 and CRP 21.  In 3/2023 she had wisdom teeth removed and was on intermittent NSAIDs.  On 23 colonoscopy  significant for perianal skin tags, normal ileum with ileocolonic anastomotic ulceration, and erythematous/hemorrhagic/inflammed nodular ulcerated mucosa in rectum and sigmoid colon (bx rectum/sigmoid/descending with mild active inflammation). EGD 5/2023 significant for mild gastric fundus and antrum with inflammation (bx HP neg moderate chronic gastritis, rare non-necrotizing granuloma) and normal duodenum. In 6/21/23 patient started on entyvio and subsequently established care with Dr. Grier at [facility] in 7/2023. Stool calprotectin elevated in 7/2023 (145), 10/2023 (719), 11/2023 (293). I met patient initially 1/2024 when she was in her 2nd trimester of pregnancy and she had continued on entyvio q 8 weeks and reported intermittent upper abdominal cramping, RUQ that woke her up in the middle of the night, 2-3 loose to watery BMS/d with mucous and streaks of blood on toilet paper with perianal skin tags, oral ulcers that had resolved but were active 12/2023.      Interval History:  - current IBD meds: Entyvio 300 mg IV q 8 weeks (started 6/2023, LD 1/18-late due to C diff, ND 3/14), uceris foam BID (started 1/23/24-2/20/24)  - 1-2 loose to formed BMs/d and 1 on occ noturnal BMs  - 24 weeks pregnant with C section scheduled 6/6/24  - 1/11/24- C diff Ag +/toxin neg/PCR +, took 10 day course of vancomycin 125 mg po QID with no change in symptoms   - weight loss- 6 pounds in 4 mos  - nausea few times a week, rare vomiting- last episode 2 weeks ago  - C section scheduled 6/6/24  - trouble  swallowing and mouth sores improved since doing magic mouth wash and colgate peroxyl, last used magic mouthwash ew days ago  - ongoing anxiety  - US with OB today   - RN 2 wk check on 2/6, pt still symptomatic, it was decided to do another 2 wks of Uceris foam and then turn in stool yuliana after 1 mo of Uceris foam BID  - today is her last dose of Uceris as she has no more supply  - heartburn  - back pain  - NSAID use: No  - Narcotic  use: No  - Alternative/complementary meds for IBD: No    Prior Pertinent Surgeries:   5/2020 laparoscopic right hemicolectomy (resection of TI, ccume, right colon) with surgical path c/w mild chronic active ileitis and colitis with transmural inflammation, focal granuloma, perineural inflammation    Last pertinent Endoscopy/Imaging:  3/12/20 MRE: wall thickening of the distal ileum at the level of the ileocecal valve with narrowing of the lumen, with the lumen measuring up to 3 mm. Approximately 2.4 cm proximal to this area is an additional area of circumferential wall thickening measuring approximately 2.6 cm in length with luminal narrowing measuring as narrow as 3 mm. Abnormal motion and lack of peristalsis is noted on the cine images within this area. Restricted diffusion is noted within the bowel wall within this area with minimal perienteric edema   5/12/23 colonoscopy:  perianal skin tags, normal ileum with ileocolonic anastomotic ulceration, and erythematous/hemorrhagic/inflammed nodular ulcerated mucosa in rectum and sigmoid colon (bx rectum/sigmoid/descending with mild active inflammation)  5/12/23 EGD:  mild gastric fundus and antrum with inflammation (bx HP neg moderate chronic gastritis, rare non-necrotizing granuloma) and normal duodenum.    Therapeutic Drug Monitoring Labs:  12/2016 6TG 430  2/2017 humira levels 11.8/Abs neg  3/2017 her 6TG 248/6MMP 2309, humira levels 11.6  6/2018 6TG 174, 6MMP 1847   1/2019 6TG 287/6MMP 6350  8/2018 humira 17.8.    Prior IBD Therapies:  Entocort- 2016  6MP 2960-7275 (12/2016 25 mg/d, 8/2017 alternating 50 mg and 75 mg/d, stopped 9/2020)  Remicade 3624-5167- secondary nonresponder due to antibodies  Humira 40 mg SC weekly (3/2016-9/2020, weekly 9/2016)- secondary nonresponder  Stelara 90 mg SC q 4 weeks (started 9/10/20, 90 mg SC q 4 weeks Feb/March 2021)- effective and possibly had inflammation on scope due to NSAIDs    Vaccinations:  No results found for:  ""HEPBSAB"  Lab Results   Component Value Date    HEPBSURFABQU POSITIVE 01/09/2024    HEPBSURFABQU 68 01/09/2024     Lab Results   Component Value Date    HEPAIGG Non-reactive 01/09/2024     Lab Results   Component Value Date    VARICELLAZOS 77.38 01/09/2024    VARICELLAINT Negative 01/09/2024     There is no immunization history for the selected administration types on file for this patient.  Flu shot: recommended yearly   COVID vaccine/booster:  per CDC recommendations  RSV: after age 61 yo  Tetanus (Tdap):  due 4/2032  PPSV 20: 8/2025  HPV: NA     Meningococcal: NA  Hepatitis A: will defer due to pregnancy  MMR (live vaccine): immune         Chickenpox status/Varicella (live vaccine):  immune  Shingrix: will defer due to pregnancy    Review of Systems   Constitutional:  Negative for chills, fever and weight loss.   HENT:          No oral ulcers, dysphagia, oral thrush   Eyes:  Negative for blurred vision, pain and redness.   Respiratory:  Negative for cough and shortness of breath.    Cardiovascular:  Negative for chest pain.   Gastrointestinal:  Positive for abdominal pain, heartburn, nausea and vomiting.   Genitourinary:  Negative for dysuria and hematuria.   Musculoskeletal:  Positive for back pain. Negative for joint pain.   Skin:  Negative for rash.   Psychiatric/Behavioral:  Negative for depression. The patient is nervous/anxious. The patient does not have insomnia.      All Medical History/Surgical History/Family History/Social History/Allergies have been reviewed and updated in EMR    Outpatient Medications Marked as Taking for the 2/22/24 encounter (Office Visit) with Antonio Montgomery MD   Medication Sig Dispense Refill    (Magic mouthwash) 1:1:1 diphenhydrAMINE(Benadryl) 12.5mg/5ml liq, aluminum & magnesium hydroxide-simethicone (Maalox), LIDOcaine viscous 2% Swish and spit 5 mLs every 4 (four) hours as needed. for mouth sores 900 mL 0    buPROPion (WELLBUTRIN XL) 300 MG 24 hr tablet Take 300 mg by " "mouth once daily.      ergocalciferol (ERGOCALCIFEROL) 50,000 unit Cap Take 1 capsule (50,000 Units total) by mouth every 7 days. 4 capsule 5    ondansetron (ZOFRAN) 4 MG tablet Take 1 tablet (4 mg total) by mouth every 6 (six) hours as needed for Nausea (nausea). 10 tablet 1    pantoprazole (PROTONIX) 40 MG tablet Take 1 tablet (40 mg total) by mouth once daily. (Patient taking differently: Take 40 mg by mouth 2 (two) times daily.) 90 tablet 1    prenatal 21/iron fu/folic acid (PRENATAL COMPLETE ORAL) Take 1 tablet by mouth once daily.      vedolizumab (ENTYVIO) 300 mg SolR injection Inject 300 mg into the vein every 8 weeks.       Vital Signs:  /79 (BP Location: Left arm, Patient Position: Sitting)   Pulse (!) 116   Temp 96.8 °F (36 °C)   Ht 5' 7" (1.702 m)   Wt 88.5 kg (195 lb 1.7 oz)   SpO2 98%   BMI 30.56 kg/m²      Physical Exam  Abdominal:      General: Bowel sounds are normal. There is no distension.      Palpations: Abdomen is soft.      Tenderness: There is no abdominal tenderness.     Labs:   Lab Results   Component Value Date    CRP 34.5 (H) 01/09/2024    CALPROTECTIN 379.2 (H) 01/23/2024     Lab Results   Component Value Date    HEPBSAG Non-reactive 01/09/2024    HEPBCAB Non-reactive 01/09/2024     Lab Results   Component Value Date    TBGOLDPLUS Negative 01/09/2024     Lab Results   Component Value Date    GUFTHATB78QL 41 03/23/2023    FENWZQSZ00 311 01/09/2024     Lab Results   Component Value Date    WBC 10.84 01/09/2024    HGB 12.2 01/09/2024    HCT 36.3 (L) 01/09/2024    MCV 86 01/09/2024     01/09/2024     Lab Results   Component Value Date    CREATININE 0.5 01/09/2024    ALBUMIN 2.9 (L) 01/09/2024    BILITOT 0.2 01/09/2024    ALKPHOS 62 01/09/2024    AST 9 (L) 01/09/2024    ALT 9 (L) 01/09/2024       Assessment/Plan:  Harper CAMPA is a 23 y.o. female with Crohn's disease with ileo-transverse anastomosis (gastroduodenal, ileum, pancolonic S/P ileocolonic resection due to " stricture) and history of EIMS including oral ulcers, erythema nodosum and episcleritis, history of C diff (2024- oral vancomycin).    I am seeing patient for urgent appt due to her concerns for weight loss during pregnancy and asking about restarting stelara instead of entyvio.  Since her initial visit with me she had stool + for C diff and took 1 month course of uceris foam BID with improvement of symptoms. She has oral ulcers though not sure if correlative with the active CD symptoms but this and her trouble swallowing have also improved (magic mouth wash and colgate peroxyl helped). She is now 24 weeks pregnant and C section scheduled 24.  Today we also discussed that her weight loss was recent but she is slowly gaining weight and this is likely multifactorial and not just related to active IBD but also nausea/vomiting and oral ulcers/trouble swallowing.  I would prefer not to change treatment during pregnancy and pt is interested in going back on stelara vs skyrizi since she seemed to do best on stelara q 4 weeks in the past.  We discussed considering this after delivery if possible may if we restart stelara given risk of immunogenicity due to prior exposure. For now she will continue on entyvio, start uceris foam qhs while we wait for her stool calprotectin to come back and may consider repeat stool calprotectin to closely monitor her symptoms. I will see her about 2 weeks after her  to be sure no wound infection and discuss timing of colonoscopy and treatment options.     # Crohn's disease with ileo-transverse anastomosis (gastroduodenal, ileum, pancolonic S/P ileocolonic resection due to stricture):  - uceris foam (completed BID course )- will restart qhs today and further recommendations after stool calprotectin but minimum will do for 1 month  - continue entyvio q 8 weeks  - stool calprotectin in process- will contact pt when results are back  - colonoscopy after delivery likely  2024  - drug monitoring labs: CBC/CMP q 6 mos (2024), TPMT, TB quantiferon (next due 2025), Hep B testing (next due 2025)  - TDM: trough VDZ levels/abs 2024 before infusion    # Pregnancy:  - US today with OB and pt will keep me informed of US results  - I will touch base with her OB Sonny Lundberg (office # [phone])  - no live vaccines for  for first 6 mos of life though may be considered given entyvio and pt will check with pediatrician  - entyvio safe during pregnancy and during breast feeding  - entyvio last dose prior to scheduled Csection will be   - scheduled  24    # Immunodeficiency due to long term immunosuppressive drug therapy and IBD specific health maintenance:  CRC risk- sx 2013, pancolonic, surveillance colonoscopy q 1-2 years   Skin exam- recommend using sunblock/hats/sunprotective clothing and yearly skin exams  Risk for osteopenia/osteoporosis- none  Pap smear- yearly- normal 2023  Vitamin D- normal 3/2023  Vaccines- no live vaccines,hep A and shingrix after delivery but pt will also make sure okay with pediatrician since she may be breastfeeding    Visit today is associated with current or anticipated ongoing medical care related to this patient's single serious condition/complex condition crohn's disease in setting of pregnancy.     I spent a total of 40 minutes on the day of the visit.This includes face to face time and on-face to face time preparing to see the patient (eg, review of tests, notes), obtaining and/or reviewing separately obtained history, documenting clinical information in the electronic or other health record, independently interpreting results and communicating results to the patient/family/caregiver, and coordinating care.     No follow-ups on file.    Antonio Montgomery MD  Department of Gastroenterology  Medical Director, Inflammatory Bowel Disease              Answers submitted by the patient for this visit:  Established Patient  Questionnaire  (Submitted on 2/22/2024)  mouth sores: Yes  trouble swallowing: Yes

## 2024-02-22 NOTE — PATIENT INSTRUCTIONS
- refill uceris foam and continue nightly until stool calprotectin is back-  on your way out  - continue entyvio as scheduled 3/14 and then   - trough entyvio drug levels- 24  - email me after US today  - will see you in clinic after delivery   - repeat stool calprotectin timing to be determined   - when you see a pediatrician ask if you can get hep A and shingrix when breastfeeding  - no live vaccines for  for first 6 mos- discuss pediatrician  - breastfeeding is safe with entyvio  - start boost 2 cans daily

## 2024-02-27 ENCOUNTER — PATIENT MESSAGE (OUTPATIENT)
Dept: GASTROENTEROLOGY | Facility: CLINIC | Age: 24
End: 2024-02-27
Payer: COMMERCIAL

## 2024-02-27 ENCOUNTER — TELEPHONE (OUTPATIENT)
Dept: GASTROENTEROLOGY | Facility: CLINIC | Age: 24
End: 2024-02-27
Payer: COMMERCIAL

## 2024-02-27 RX ORDER — METHYLPREDNISOLONE 4 MG/1
TABLET ORAL
Qty: 21 EACH | Refills: 0 | Status: SHIPPED | OUTPATIENT
Start: 2024-02-27 | End: 2024-03-19

## 2024-02-27 NOTE — TELEPHONE ENCOUNTER
Current IBD meds: Entyvio 300 mg IV q 8 weeks (started 6/2023, LD 1/18-late due to C diff, ND 3/14), uceris foam BID (started 1/23/24-2/20/24, restarted 3/22/24)  Called patient and reports she feels slightly better since restarting uceris foam BID again on 2/22/24  Stool calprotectin higher on 2/22  (stool calprotectin 579)    Plan  - medrol dose guy  - continue uceris foam BID  - repeat stool calprotectin 3/7 after 2 weeks of uceris foam BID and medrol dose guy- future email sent to pt to remind her and she had stool orders and kit already

## 2024-03-05 ENCOUNTER — TELEPHONE (OUTPATIENT)
Dept: ENDOSCOPY | Facility: HOSPITAL | Age: 24
End: 2024-03-05
Payer: COMMERCIAL

## 2024-03-05 DIAGNOSIS — K50.919 CROHN'S DISEASE WITH COMPLICATION, UNSPECIFIED GASTROINTESTINAL TRACT LOCATION: Primary | ICD-10-CM

## 2024-03-05 NOTE — TELEPHONE ENCOUNTER
Spoke to patient to schedule procedure(s) Colonoscopy       Physician to perform procedure(s) Dr. ERWIN Montgomery  Date of Procedure (s) 7/8/24  Arrival Time 6:50 AM  Time of Procedure(s) 7:50 AM   Location of Procedure(s) [facility] [address] [facility]  Type of Rx Prep sent to patient: Miralax  Instructions provided to patient via [facility]    Patient was informed on the following information and verbalized understanding. Screening questionnaire reviewed with patient and complete. If procedure requires anesthesia, a responsible adult needs to be present to accompany the patient home, patient cannot drive after receiving anesthesia. Appointment details are tentative, especially check-in time. Patient will receive a prep-op call 7 days prior to confirm check-in time for procedure. If applicable the patient should contact their pharmacy to verify Rx for procedure prep is ready for pick-up. Patient was advised to call the scheduling department at [phone] if pharmacy states no Rx is available. Patient was advised to call the endoscopy scheduling department if any questions or concerns arise.      [facility] Scheduling Department

## 2024-03-05 NOTE — TELEPHONE ENCOUNTER
"----- Message from Marcie Rachel sent at 3/4/2024  9:00 AM CST -----    ----- Message -----  From: Eugenia Skaggs RN  Sent: 3/4/2024  12:00 AM CST  To: [facility] Staff; #    Procedure: Colonoscopy    Diagnosis: Crohn's disease    Procedure Timin-12 weeks; 2024    #If within 4 weeks selected, please lenin as high priority#    #If greater than 12 weeks, please select "5-12 weeks" and delay sending until 2 months prior to requested date#     Provider: ADRIANA Montgomery    Location: [address]    Additional Scheduling Information: No scheduling concerns    Prep Specifications:Standard prep; Miralax/Gatorade    Is the patient taking a GLP-1 Agonist:no    Have you attached a patient to this message: yes          "

## 2024-03-07 ENCOUNTER — LAB VISIT (OUTPATIENT)
Dept: LAB | Facility: HOSPITAL | Age: 24
End: 2024-03-07
Attending: INTERNAL MEDICINE
Payer: COMMERCIAL

## 2024-03-07 DIAGNOSIS — K50.818 CROHN'S DISEASE OF BOTH SMALL AND LARGE INTESTINE WITH OTHER COMPLICATION: ICD-10-CM

## 2024-03-07 PROCEDURE — 83993 ASSAY FOR CALPROTECTIN FECAL: CPT | Performed by: INTERNAL MEDICINE

## 2024-03-11 ENCOUNTER — TELEPHONE (OUTPATIENT)
Dept: GASTROENTEROLOGY | Facility: CLINIC | Age: 24
End: 2024-03-11
Payer: COMMERCIAL

## 2024-03-11 LAB — CALPROTECTIN STL-MCNT: 690 MCG/G

## 2024-03-11 NOTE — TELEPHONE ENCOUNTER
- Current IBD meds: Entyvio q 8 weeks (LD: 1/8- delayed d/t C. Diff, ND: 3/14), Uceris foam BID (started 2/22/24)  - Medrol dose pack (completed 3/4 or 3/5)  - Reviewed stool calprotectin higher  - Overall doesn't feel bad  - Fatigue though unsure if related to pregnancy vs crohn's disease  - 1-2 loose-formed BM/ QD; 1 nocturnal BM/week  - Blood 3x/ weekly- mixed in stool & on toilet paper; notes hemorrhoids   - Feels she like possible weight gain due to recent medrol dose pack use though has not weighed herself  - Occasional nausea; no emesis  - Trouble swallowing & mouth sores shortly after 2/22 f/u appt though now resolved w/o intervention  - Upper abdominal pain 4-5/10 1-2x/ weekly; usually at night & sleep helps  - 26 weeks 5 days pregnant with C section scheduled 6/6/24  - Will discuss plan of care with Dr. Montgomery

## 2024-03-14 ENCOUNTER — TELEPHONE (OUTPATIENT)
Dept: GASTROENTEROLOGY | Facility: CLINIC | Age: 24
End: 2024-03-14
Payer: COMMERCIAL

## 2024-03-14 NOTE — TELEPHONE ENCOUNTER
Current IBD meds: Entyvio 300 mg IV q 8 weeks (started 6/2023, LD 1/18-late due to C diff, ND 3/14)     - Confirmed with [facility] that pt received infusion on 3/14  - will proceed with ND on 5/9

## 2024-04-11 ENCOUNTER — PATIENT MESSAGE (OUTPATIENT)
Dept: GASTROENTEROLOGY | Facility: CLINIC | Age: 24
End: 2024-04-11
Payer: COMMERCIAL

## 2024-04-11 RX ORDER — BUDESONIDE 3 MG/1
CAPSULE, COATED PELLETS ORAL
Qty: 126 CAPSULE | Refills: 0 | Status: SHIPPED | OUTPATIENT
Start: 2024-04-11 | End: 2024-05-06 | Stop reason: SDUPTHER

## 2024-04-11 NOTE — TELEPHONE ENCOUNTER
- Crohn's disease with ileo-transverse anastomosis (gastroduodenal, ileum, pancolonic S/P ileocolonic resection due to stricture)   - Entyvio q 8 weeks (started 2023; LD: 3/14/24, ND: 24)  - 23 EGD- gastritis & erythematous mucosa in antrum; colonoscopy- mild active colitis: descending & sigmoid colon & rectum, ileo-colonic anastomotic ulcer    - Pregnant: 31w1d w/  scheduled for 24  - Reports worsening symptoms x2 weeks after self discontinuing Uceris foam BID  - Reports 1-2 formed BM/ QD w/ loose stools 1-2x/ wk & blood dripping in the toilet bowl & on the toilet paper w/ all BM; 1-2 nocturnal BM/ wk  - Upper abdominal pain -7/10 occurring 3x/ wk & lasting 1-2 hours; no alleviating or aggravating factors   - Occasional nausea w/o emesis  - Oral ulcers to mouth & throat making eating painful, uses Orajel prn; has magic mouthwash though not using & not helpful for throat ulcerations   - Has IBD f/u 24 & colonoscopy scheduled for 24  - Will discuss w/ Dr. Frederick

## 2024-04-11 NOTE — TELEPHONE ENCOUNTER
Called & spoke to pt  - Informed of plan to begin Entocort 9 mg/ QD & to let us know if any ins auth issues  - All questions answered  - Will update Dr. Montgomery

## 2024-04-15 NOTE — TELEPHONE ENCOUNTER
Called & spoke to pt  - Started Entocort 9 mg/ QD 4/11/24  - Denies oral/ throat ulcers as of today, 4/15/24  - No pain or nocturnal bowel movements since starting Entocort  - BM stable: 1-2 formed BM/ QD w/ occasional loose stools & blood dripping in the toilet bowl & on the toilet paper w/ all BM  - Has OB appt tomorrow & will keep us posted regarding outcome of visit  - Informed on list for sooner f/u appt w/ Dr. Montgomery   - Pt expressed understanding

## 2024-04-16 ENCOUNTER — PATIENT MESSAGE (OUTPATIENT)
Dept: GASTROENTEROLOGY | Facility: CLINIC | Age: 24
End: 2024-04-16
Payer: COMMERCIAL

## 2024-04-19 ENCOUNTER — OFFICE VISIT (OUTPATIENT)
Dept: GASTROENTEROLOGY | Facility: CLINIC | Age: 24
End: 2024-04-19
Payer: COMMERCIAL

## 2024-04-19 ENCOUNTER — TELEPHONE (OUTPATIENT)
Dept: GASTROENTEROLOGY | Facility: CLINIC | Age: 24
End: 2024-04-19

## 2024-04-19 ENCOUNTER — LAB VISIT (OUTPATIENT)
Dept: LAB | Facility: HOSPITAL | Age: 24
End: 2024-04-19
Attending: INTERNAL MEDICINE
Payer: COMMERCIAL

## 2024-04-19 ENCOUNTER — TELEPHONE (OUTPATIENT)
Dept: ENDOSCOPY | Facility: HOSPITAL | Age: 24
End: 2024-04-19
Payer: COMMERCIAL

## 2024-04-19 VITALS
DIASTOLIC BLOOD PRESSURE: 80 MMHG | HEIGHT: 67 IN | BODY MASS INDEX: 32.08 KG/M2 | RESPIRATION RATE: 18 BRPM | WEIGHT: 204.38 LBS | OXYGEN SATURATION: 97 % | SYSTOLIC BLOOD PRESSURE: 125 MMHG | HEART RATE: 112 BPM | TEMPERATURE: 98 F

## 2024-04-19 DIAGNOSIS — K50.818 CROHN'S DISEASE OF BOTH SMALL AND LARGE INTESTINE WITH OTHER COMPLICATION: Primary | ICD-10-CM

## 2024-04-19 DIAGNOSIS — K50.818 CROHN'S DISEASE OF BOTH SMALL AND LARGE INTESTINE WITH OTHER COMPLICATION: ICD-10-CM

## 2024-04-19 LAB
FERRITIN SERPL-MCNC: 7 NG/ML (ref 20–300)
IRON SERPL-MCNC: 24 UG/DL (ref 30–160)
SATURATED IRON: 4 % (ref 20–50)
TOTAL IRON BINDING CAPACITY: 586 UG/DL (ref 250–450)
TRANSFERRIN SERPL-MCNC: 396 MG/DL (ref 200–375)
VIT B12 SERPL-MCNC: 237 PG/ML (ref 210–950)

## 2024-04-19 PROCEDURE — 82728 ASSAY OF FERRITIN: CPT | Performed by: INTERNAL MEDICINE

## 2024-04-19 PROCEDURE — 3008F BODY MASS INDEX DOCD: CPT | Mod: CPTII,S$GLB,, | Performed by: INTERNAL MEDICINE

## 2024-04-19 PROCEDURE — 99215 OFFICE O/P EST HI 40 MIN: CPT | Mod: S$GLB,,, | Performed by: INTERNAL MEDICINE

## 2024-04-19 PROCEDURE — 82607 VITAMIN B-12: CPT | Performed by: INTERNAL MEDICINE

## 2024-04-19 PROCEDURE — G2211 COMPLEX E/M VISIT ADD ON: HCPCS | Mod: S$GLB,,, | Performed by: INTERNAL MEDICINE

## 2024-04-19 PROCEDURE — 3074F SYST BP LT 130 MM HG: CPT | Mod: CPTII,S$GLB,, | Performed by: INTERNAL MEDICINE

## 2024-04-19 PROCEDURE — 1159F MED LIST DOCD IN RCRD: CPT | Mod: CPTII,S$GLB,, | Performed by: INTERNAL MEDICINE

## 2024-04-19 PROCEDURE — 3079F DIAST BP 80-89 MM HG: CPT | Mod: CPTII,S$GLB,, | Performed by: INTERNAL MEDICINE

## 2024-04-19 PROCEDURE — 36415 COLL VENOUS BLD VENIPUNCTURE: CPT | Performed by: INTERNAL MEDICINE

## 2024-04-19 PROCEDURE — 83540 ASSAY OF IRON: CPT | Performed by: INTERNAL MEDICINE

## 2024-04-19 NOTE — PATIENT INSTRUCTIONS
- we will add EGD to your scope coming up  - take magic mouth wash as needed for oral ulcers  - try to increase protein in your diet- consider protein shakes   - continue entyvio as scheduled on 5/9  - stop entocort 9 mg/d with last dose 6/5/24

## 2024-04-19 NOTE — PROGRESS NOTES
IBD PATIENT INTAKE:    Confirm current PCP: JUAN Baxter  If no PCP-  number given to establish [phone]:  N/A    IBD THERAPY (name, dose/frequency):  Entyvio q 8 weeks & Entocort 9 mg/ QD  Last dose:  3/14    Next dose:  5/9    Antibiotics (past 30 Days):  No  If yes   Indication:  Name of antibiotic:  Completion date:

## 2024-04-19 NOTE — TELEPHONE ENCOUNTER
----- Message from Antonio Montgomery MD sent at 4/19/2024  4:36 PM CDT -----  Please schedule pt for MRE. I will place orders    SS

## 2024-04-19 NOTE — TELEPHONE ENCOUNTER
EGD added to 7/8 case. Also informed patient it was added.    add EGD to colonoscopy- indication is oral ulcers, dysphagia, GERD- scope already scheduled 7/8 - per Dr. Montgomery

## 2024-04-19 NOTE — PROGRESS NOTES
[facility]             Inflammatory Bowel Disease   Follow-up  Note              TODAY'S VISIT DATE:  4/19/2024    Chief Complaint:   Chief Complaint   Patient presents with    Crohn's Disease     PCP: JUAN Baxter    Previous History:  Harper CAMPA is a 23 y.o. female with pertinent past medical history including oral ulcers, episcleritis (6/2016), erythema nodosum (1703-2295), PCOS, osteopenia.  Patient was doing well until 4/2013 when she had diarrhea, N/V, wt loss, oral ulcers and underwent EGD and colonoscopy with finding c/w Crohn's disease (gastroduodenal, pan-colonic, ileum).  She was initially placed on corticosteroids and remicade from 9963-6121 and was a secondary nonresponder due to developing high abs despite adding 6MP in 2015 (noral TPM 30). In approximately 2014 or 2015 she had erythema nodosum LE and recalls prednisone helped symptoms and she had no recurrence. Even on remicade she feels like there was not a period of deep remission though likely a partial response. She was hospitalized in 2/2016 and underwent EGD significant for endoscopically linear erythema and white plaques in the entire esophagus with bx c/w mild acute esophagitis throughout the esophagus, HP neg moderate chronic active gastritis.  Colonoscopy significant for multiple perianal skin tags and active inflammation throughout the entire colon, ICV and ileum. She was discharged home with steroids (tapered off within 2 mos) and enteral nutrition through NGT. In approximately 2015 she was not compliant with 6MP because difficult to swallow pills due to painful LAD in neck.  She was then started on Humira and continued 6MP 50 mg/d in 3/2016. In 6/2016 she was diagnosed with episcleritis treated with steroid eye drops. In 9/2016 she was optimized to humira 40 mg SC weekly..  In 9/2016 pt started on budesonide 9 mg/d.  By 12/2016 her 6MP was decreased to 25 mg/d due to elevated 6TG 430. In 4/2017 EGD  significanat for some focal gastric metaplasia on duodenal biopsies with moderate inflammation rectum, sigmoid, transverse, right colon and mild inflammation left colon. In 1/2017 calprotectin 891 and 2/2017 calprotectin 390. In 2/2017 MRE with diffuse inflammation in cecum and TI.   In 2/2017 humira levels 11.8/Abs neg.  In 3/2017 her 6TG 248/6MMP 2309, humira levels 11.6. In 8/2017 6mP changed from 50 mg/d to alternating 75 mg and 50 mg/d.  In 11/2017 calprotectin >1250 and in 5/2018 1030. In 6/2018 6TG 174, 6MMP 1847 and in 1/2019 6TG 287/6MMP 6350.  8/2018 humira 17.8. Overall she felt humira worked better than remicade and overall seemed to be close to remission.  In 6/2019 EGD normal except for pancreatic rest in the prepyloric area and colonoscopy normal with localized 4 mm inflammatory stricture at the transition zone from ascending to cecum though normal cecal tissue through the stricture.  Unable to evaluate ICV or ileum. In 6/2019 stool calprotectin 179.  MRE 7/2019 significant for circumferential wall thickening with transmural enhancement of the TI and cecum extending to the mid right colon.  Due to stricture she was referred to pediatric surgeon, Dr. Monteiro but surgery was deferred.  In 1/2020 colonoscopy significant for perianal skin tags with pan-colonic inflammation (right colon worse than left colon), previous stricture in proximal ascending improved, ICV stricture with ulceration and friability though unable to pass colonoscopy through stricture with TI inflammation.  In 1/2020 EGD significant for spontaneous antral granularity with oozing and inflammation/erythema of the fundus with normal duodenum. She met Dr. Daniels 2/2020 at which time she had normal BMs with nausea and intermittent vomiting q 2 weeks.  Colonoscopy 3/2020 significant for ulcerated Crohn's disease with moderate stricture in the ascending colon not traversed and tatoo placed with distal colon with mild erythema without any ulcers  and normal sigmoid and rectum. MRE 3/2020 significant for wall thickening of the distal ileum at the ICV with narrowing  with second 2.6 cm ileum with narrowing located 2.4 cm proximal to this area. In 2020 patient underwent laparoscopic right hemicolectomy (resection of TI, ccume, right colon) with surgical path c/w mild chronic active ileitis and colitis with transmural inflammation, focal granuloma, perineural inflammation.  Postoperatively she continued on Humira 40 mg SC weekly and 6MP alternating 50 mg/75 mg/d.  In 2020 EGD normal and colonoscopy significant for mild and patchy apthous ulcers in sigmoid colon with remainder of the colon normal with side to side ileocolonic anastomosis in the transverse colon and anastomosis ulcerated with blind end with ulcer and neoterminal ileum with >5 apthous ulcers c/w Rutgeert's score i2.  Fecal calprotectin 263 and CRP elevated at 1.6.  Humira with 6MP discontinued 2020.  She started stelara 9/10/20. Due to low stelara levels her dose was optimized to 90 mg SC q 4 weeks by 2021.  In 2021 fecal calprotectin 50 and CRP 1.9. She delivered health term baby by  21 and per notes prior to delivery she had slight elevation in her fecal calprotectin.  In 10/2021 EGD significant for LA Grade A esophagitis and colonoscopy significant for large perianal skin tags with side to side ileocolonic anastomosis in the transverse colon that is patent and 2 small ulcers at the ileotransverse anastomosis and normal neoterminal ileum. Dr[facility] Marcum at [facility] referred patient to  2022 at which time she continued on stelara 90 mg SC q 4 weeks though mention of slightly elevated stool calprotectin 181. On 10/7/22 patient received reinduction with stelara IV and then continued on stelara 90 mg SC q 4 weeks.  In 3/2023 stool calprotectin 242 and CRP 21.  In 3/2023 she had wisdom teeth removed and was on intermittent NSAIDs.  On 23 colonoscopy  significant for perianal skin tags, normal ileum with ileocolonic anastomotic ulceration, and erythematous/hemorrhagic/inflammed nodular ulcerated mucosa in rectum and sigmoid colon (bx rectum/sigmoid/descending with mild active inflammation). EGD 5/2023 significant for mild gastric fundus and antrum with inflammation (bx HP neg moderate chronic gastritis, rare non-necrotizing granuloma) and normal duodenum. In 6/21/23 patient started on entyvio and subsequently established care with Dr. Grier at [facility] in 7/2023. Stool calprotectin elevated in 7/2023 (145), 10/2023 (719), 11/2023 (293). I met patient initially 1/2024 when she was in her 2nd trimester of pregnancy and she had continued on entyvio q 8 weeks and reported intermittent upper abdominal cramping, RUQ that woke her up in the middle of the night, 2-3 loose to watery BMS/d with mucous and streaks of blood on toilet paper with perianal skin tags, oral ulcers that had resolved but were active 12/2023.  In 1/2024 C diff neg/PCR pos and took 10 day course of oral vancomycin w./o change in symptoms. She had trouble swallowing and mouth sores with had some benefit with magic mouthwash and colgate peroxyl.  Due to symptoms of loose stool, occ notruanl BMS we proceeded with uceris foam (1/23/24-2/20/24) and we planned to check stool calprotectin after course of uceris foam.     Interval History:  - current IBD meds: Entyvio 300 mg IV q 8 weeks (started 6/2023, LD 3/14, ND 5/9), entocort 9 mg daily (started 4/11/24)  - recent IBD meds:  uceris foam BID (started 1/23/24-2/20/24, 3/11-approximately 3/25/24), medrol dose guy 2/27/24-3/3/24  - 1-2 formed BMs/d, 1 nocturnal BMs/week, 25% of bowel movements with urgency, no incontinence, BRB  dripping in toilet and on TP and sometimes and less frequent  - abdominal pain- upper, 3 times/week, lasts for 1/2-2 hours, no radiating, no aggravating or relieving factors and resolves with no intervention  - 32 weeks pregnant  "with C section scheduled 6/6/24- US about 2 weeks ago and baby is growing well with no issues, gained 9 pounds since our last visit in past 2 mos  - Last seen in clinic on 2/22/24 at which time we proceeded with stool calprotectin which on 2/22/24 was 579 and restarted uceris foam qhs due to stool calprotectin 579 on 2/22/24. This was then followed by medrol dose guy up from approximately 2/27/24-3/3/24.  After results of the stool calprotectin checked in with patient and on 3/11/24 she reported 1-2 loose to formed BMs/d with 1 nocturnal BM once a twice a week and 3 times/week blood mixed in stool with trouble swallowing and recurrent oral ulcers.  On 3/11/24 she increased uceris foam back up to BID at that time.  She then took uceris foam BID for approximately 2 weeks and self discontinued because she ran out and felt that her symptoms were stable and manageable. On 4/11/24 she called due to 2 weeks of worsening symptoms with 1-2 loose to formed BMs/d with 1-2 nocturnal BMs each week, blood dripping in the toilet.  Upper abd pain 6-7/10 2 times/week lasting 1-2 hours with occ nausea but no aggravating or relieving factors.  At that time Dr. Frederick prescribed entocort 9 mg/d.  Ongoing oral ulcers intermittent and sometimes magic mouthwash.   - weakness, no dizziness  - mouth sores- some but not bad today and this makes it difficult to eat  - back pain  - feels that her HR is racing and sometimes "hard to breathe"  - NSAID use: No  - Narcotic use: No  - Alternative/complementary meds for IBD: No    Prior Pertinent Surgeries:   5/2020 laparoscopic right hemicolectomy (resection of TI, ccume, right colon) with surgical path c/w mild chronic active ileitis and colitis with transmural inflammation, focal granuloma, perineural inflammation    Last pertinent Endoscopy/Imaging:  3/12/20 MRE: wall thickening of the distal ileum at the level of the ileocecal valve with narrowing of the lumen, with the lumen measuring up to 3 " mm. Approximately 2.4 cm proximal to this area is an additional area of circumferential wall thickening measuring approximately 2.6 cm in length with luminal narrowing measuring as narrow as 3 mm. Abnormal motion and lack of peristalsis is noted on the cine images within this area. Restricted diffusion is noted within the bowel wall within this area with minimal perienteric edema   5/12/23 colonoscopy:  perianal skin tags, normal ileum with ileocolonic anastomotic ulceration, and erythematous/hemorrhagic/inflammed nodular ulcerated mucosa in rectum and sigmoid colon (bx rectum/sigmoid/descending with mild active inflammation)  5/12/23 EGD:  mild gastric fundus and antrum with inflammation (bx HP neg moderate chronic gastritis, rare non-necrotizing granuloma) and normal duodenum.    Therapeutic Drug Monitoring Labs:  12/2016 6TG 430  2/2017 humira levels 11.8/Abs neg  3/2017 her 6TG 248/6MMP 2309, humira levels 11.6  6/2018 6TG 174, 6MMP 1847   1/2019 6TG 287/6MMP 6350  8/2018 humira 17.8.    Prior IBD Therapies:  Entocort- 2016  6MP 0709-8937 (12/2016 25 mg/d, 8/2017 alternating 50 mg and 75 mg/d, stopped 9/2020)  Remicade 7541-4328- secondary nonresponder due to antibodies  Humira 40 mg SC weekly (3/2016-9/2020, weekly 9/2016)- secondary nonresponder  Stelara 90 mg SC q 4 weeks (started 9/10/20, 90 mg SC q 4 weeks Feb/March 2021)- effective and possibly had inflammation on scope due to NSAIDs    Vaccinations:  Lab Results   Component Value Date    HEPBSURFABQU POSITIVE 01/09/2024    HEPBSURFABQU 68 01/09/2024     Lab Results   Component Value Date    HEPAIGG Non-reactive 01/09/2024     Lab Results   Component Value Date    VARICELLAZOS 77.38 01/09/2024    VARICELLAINT Negative 01/09/2024       Lab Results   Component Value Date    MUMPSIGGSCRE 63.00 01/09/2024    MUMPSIGGINTE Positive 01/09/2024      Lab Results   Component Value Date    RUBEOLAIGGAN >300.00 01/09/2024    RUBEOLAINTER Positive 01/09/2024     There  is no immunization history for the selected administration types on file for this patient.  Flu shot: recommended yearly   COVID vaccine/booster:  per CDC recommendations  RSV: after age 59 yo  Tetanus (Tdap):  4/2032  PPSV 20: 8/2025  HPV: NA     Meningococcal: NA  Hepatitis A: will defer due to pregnancy  Shingrix: will defer due to pregnancy    Review of Systems   Constitutional:  Negative for chills, fever and weight loss.   HENT:          No oral ulcers, dysphagia, oral thrush   Eyes:  Negative for blurred vision, pain and redness.   Respiratory:  Positive for shortness of breath. Negative for cough.    Cardiovascular:  Negative for chest pain.   Gastrointestinal:  Positive for abdominal pain and nausea. Negative for heartburn and vomiting.   Genitourinary:  Negative for dysuria and hematuria.   Musculoskeletal:  Positive for back pain. Negative for joint pain.   Skin:  Negative for rash.   Psychiatric/Behavioral:  Negative for depression. The patient is not nervous/anxious and does not have insomnia.      All Medical History/Surgical History/Family History/Social History/Allergies have been reviewed and updated in EMR    Current Outpatient Medications   Medication Sig Dispense Refill    budesonide (ENTOCORT EC) 3 mg capsule Take 3 capsules (9 mg total) by mouth once daily for 28 days, THEN 2 capsules (6 mg total) once daily for 14 days, THEN 1 capsule (3 mg total) once daily for 14 days. 126 capsule 0    buPROPion (WELLBUTRIN XL) 300 MG 24 hr tablet Take 300 mg by mouth once daily.      ergocalciferol (ERGOCALCIFEROL) 50,000 unit Cap Take 1 capsule (50,000 Units total) by mouth every 7 days. 4 capsule 5    ondansetron (ZOFRAN) 4 MG tablet Take 1 tablet (4 mg total) by mouth every 6 (six) hours as needed for Nausea (nausea). 10 tablet 1    pantoprazole (PROTONIX) 40 MG tablet Take 1 tablet (40 mg total) by mouth once daily. (Patient taking differently: Take 40 mg by mouth 2 (two) times daily.) 90 tablet 1     "prenatal 21/iron fu/folic acid (PRENATAL COMPLETE ORAL) Take 1 tablet by mouth once daily.      vedolizumab (ENTYVIO) 300 mg SolR injection Inject 300 mg into the vein every 8 weeks.       No current facility-administered medications for this visit.     Vital Signs:  /80 (BP Location: Right arm, Patient Position: Sitting)   Pulse (!) 112   Temp 98.4 °F (36.9 °C) (Temporal)   Resp 18   Ht 5' 7" (1.702 m)   Wt 92.7 kg (204 lb 5.9 oz)   SpO2 97%   BMI 32.01 kg/m²      Physical Exam  Abdominal:      General: Bowel sounds are normal. There is no distension.      Palpations: Abdomen is soft.      Tenderness: There is no abdominal tenderness.     Labs:   Lab Results   Component Value Date    CRP 34.5 (H) 01/09/2024    CALPROTECTIN 690.0 (H) 03/07/2024     Lab Results   Component Value Date    HEPBSAG Non-reactive 01/09/2024    HEPBCAB Non-reactive 01/09/2024     Lab Results   Component Value Date    TBGOLDPLUS Negative 01/09/2024     Lab Results   Component Value Date    SWYUKTDW92QR 41 03/23/2023    RMRPJBHR85 311 01/09/2024     Lab Results   Component Value Date    WBC 11.74 03/28/2024    HGB 10.7 (L) 03/28/2024    HCT 33.1 (L) 03/28/2024    MCV 86 03/28/2024     03/28/2024     Lab Results   Component Value Date    CREATININE 0.35 (L) 03/28/2024    ALBUMIN 3.3 (L) 03/28/2024    BILITOT 0.1 (L) 03/28/2024    ALKPHOS 96 03/28/2024    AST 23 03/28/2024    ALT 20 03/28/2024       Assessment/Plan:  Harper CAMPA is a 23 y.o. female with Crohn's disease with ileo-transverse anastomosis (gastroduodenal, ileum, pancolonic S/P ileocolonic resection due to stricture) and history of EIMS including oral ulcers, erythema nodosum and episcleritis, history of C diff (1/2024- oral vancomycin).    Patient is feeling better recently and in the past 2 mos has gained weight and progressing well with her pregnancy.  She recently had a set back shortly after discontinuation of uceris foam and started entocort 9 mg daily " while continuing on entyvio q 8 weeks.  She will complete 8 week course of entocort and we will plan on EGD/colonoscopy 2024 after she delivers and has scheduled  24.  At this time she does have some rectal bleeding I am attributing to hemorrhoids based on symptoms.     # Crohn's disease with ileo-transverse anastomosis (gastroduodenal, ileum, pancolonic S/P ileocolonic resection due to stricture):  - continue entocort 9 mg daily and will complete 8 week course 24  - continue entyvio q 8 weeks  - stool calprotectin repeat to be determined   - colonoscopy 2024  - drug monitoring labs: CBC/CMP q 6 mos (2024), TPMT, TB quantiferon (2025), Hep B testing (2025)  - TDM: trough VDZ levels/abs 2024 before infusion    # Anemia  - iron studies and B12 today  - ongoing but stable H/H being monitored closely by OB    # Pregnancy- 32 weeks pregnant:  - OB Sonny Lundberg (office # [phone])  - no live vaccines for  for first 6 mos of life though may be considered given entyvio and pt will check with pediatrician  - entyvio safe during pregnancy and during breast feeding  - entyvio last dose prior to scheduled Csection will be   - scheduled  24    # Immunodeficiency due to long term immunosuppressive drug therapy and IBD specific health maintenance:  CRC risk- sx 2013, pancolonic, surveillance colonoscopy q 1-2 years   Skin exam- yearly   Risk for osteopenia/osteoporosis- none  Pap smear- yearly- defer to OB/GYN  Vitamin D- normal 3/2023  Vaccines- no live vaccines, hep A and shingrix after delivery but pt will also make sure okay with pediatrician since she may be breastfeeding    Visit today is associated with current or anticipated ongoing medical care related to this patient's single serious condition/complex condition crohn's disease in setting of pregnancy.     I spent a total of 40 minutes on the day of the visit.This includes face to face time and on-face to face  time preparing to see the patient (eg, review of tests, notes), obtaining and/or reviewing separately obtained history, documenting clinical information in the electronic or other health record, independently interpreting results and communicating results to the patient/family/caregiver, and coordinating care.     Follow up in about 1 month (around 5/19/2024) for in person.    Antonio Montgomery MD  Department of Gastroenterology  Medical Director, Inflammatory Bowel Disease              Answers submitted by the patient for this visit:  Established Patient Questionnaire  (Submitted on 2/22/2024)  mouth sores: Yes  trouble swallowing: Yes      Answers submitted by the patient for this visit:  Established Patient Questionnaire  (Submitted on 4/19/2024)  mouth sores: Yes  Joint pain? : Yes

## 2024-05-06 ENCOUNTER — PATIENT MESSAGE (OUTPATIENT)
Dept: GASTROENTEROLOGY | Facility: CLINIC | Age: 24
End: 2024-05-06
Payer: COMMERCIAL

## 2024-05-06 DIAGNOSIS — K50.818 CROHN'S DISEASE OF BOTH SMALL AND LARGE INTESTINE WITH OTHER COMPLICATION: Primary | ICD-10-CM

## 2024-05-06 RX ORDER — BUDESONIDE 3 MG/1
9 CAPSULE, COATED PELLETS ORAL DAILY
Qty: 96 CAPSULE | Refills: 0 | Status: SHIPPED | OUTPATIENT
Start: 2024-05-06

## 2024-05-06 NOTE — TELEPHONE ENCOUNTER
Allergies reviewed  RX pended for approval   Original RX included instructions for taper, but per recent clinic note will complete 8 week course of 9 mg/ QD

## 2024-05-09 ENCOUNTER — LAB VISIT (OUTPATIENT)
Dept: LAB | Facility: HOSPITAL | Age: 24
End: 2024-05-09
Attending: INTERNAL MEDICINE
Payer: COMMERCIAL

## 2024-05-09 DIAGNOSIS — K50.818 CROHN'S DISEASE OF BOTH SMALL AND LARGE INTESTINE WITH OTHER COMPLICATION: ICD-10-CM

## 2024-05-09 PROCEDURE — 82397 CHEMILUMINESCENT ASSAY: CPT | Performed by: INTERNAL MEDICINE

## 2024-05-09 PROCEDURE — 36415 COLL VENOUS BLD VENIPUNCTURE: CPT | Mod: PO | Performed by: INTERNAL MEDICINE

## 2024-05-15 ENCOUNTER — TELEPHONE (OUTPATIENT)
Dept: GASTROENTEROLOGY | Facility: CLINIC | Age: 24
End: 2024-05-15
Payer: COMMERCIAL

## 2024-05-15 NOTE — TELEPHONE ENCOUNTER
----- Message from Micheline Hsu sent at 5/15/2024  3:21 PM CDT -----  Regarding: pt advice  Contact: pt [phone]  Pt returning call from missed call, pls call

## 2024-05-15 NOTE — TELEPHONE ENCOUNTER
Called & spoke to pt  - Unable to r/s f/u appt to 5/20 d/t OB appt scheduled that day  - Has concerns about MRI Enterography scheduled fo 5/17 given 36 weeks pregnant & may not be able to lay still for exam  - Will discuss timing of MRI w/ Dr. Montgomery

## 2024-05-16 ENCOUNTER — OFFICE VISIT (OUTPATIENT)
Dept: GASTROENTEROLOGY | Facility: CLINIC | Age: 24
End: 2024-05-16
Payer: COMMERCIAL

## 2024-05-16 VITALS
SYSTOLIC BLOOD PRESSURE: 132 MMHG | OXYGEN SATURATION: 99 % | DIASTOLIC BLOOD PRESSURE: 86 MMHG | WEIGHT: 207.44 LBS | TEMPERATURE: 98 F | BODY MASS INDEX: 32.56 KG/M2 | HEART RATE: 127 BPM | HEIGHT: 67 IN

## 2024-05-16 DIAGNOSIS — Z79.899 IMMUNODEFICIENCY DUE TO LONG TERM IMMUNOSUPPRESSIVE DRUG THERAPY: Primary | ICD-10-CM

## 2024-05-16 DIAGNOSIS — K50.818 CROHN'S DISEASE OF BOTH SMALL AND LARGE INTESTINE WITH OTHER COMPLICATION: ICD-10-CM

## 2024-05-16 DIAGNOSIS — T45.1X5A IMMUNODEFICIENCY DUE TO LONG TERM IMMUNOSUPPRESSIVE DRUG THERAPY: Primary | ICD-10-CM

## 2024-05-16 DIAGNOSIS — D84.821 IMMUNODEFICIENCY DUE TO LONG TERM IMMUNOSUPPRESSIVE DRUG THERAPY: Primary | ICD-10-CM

## 2024-05-16 PROCEDURE — 3075F SYST BP GE 130 - 139MM HG: CPT | Mod: CPTII,S$GLB,, | Performed by: INTERNAL MEDICINE

## 2024-05-16 PROCEDURE — 1159F MED LIST DOCD IN RCRD: CPT | Mod: CPTII,S$GLB,, | Performed by: INTERNAL MEDICINE

## 2024-05-16 PROCEDURE — 3079F DIAST BP 80-89 MM HG: CPT | Mod: CPTII,S$GLB,, | Performed by: INTERNAL MEDICINE

## 2024-05-16 PROCEDURE — 3008F BODY MASS INDEX DOCD: CPT | Mod: CPTII,S$GLB,, | Performed by: INTERNAL MEDICINE

## 2024-05-16 PROCEDURE — G2211 COMPLEX E/M VISIT ADD ON: HCPCS | Mod: S$GLB,,, | Performed by: INTERNAL MEDICINE

## 2024-05-16 PROCEDURE — 1160F RVW MEDS BY RX/DR IN RCRD: CPT | Mod: CPTII,S$GLB,, | Performed by: INTERNAL MEDICINE

## 2024-05-16 PROCEDURE — 99215 OFFICE O/P EST HI 40 MIN: CPT | Mod: S$GLB,,, | Performed by: INTERNAL MEDICINE

## 2024-05-16 RX ORDER — FERROUS SULFATE 325(65) MG
1 TABLET ORAL DAILY
COMMUNITY

## 2024-05-16 RX ORDER — LANOLIN ALCOHOL/MO/W.PET/CERES
100 CREAM (GRAM) TOPICAL EVERY OTHER DAY
COMMUNITY

## 2024-05-16 NOTE — PROGRESS NOTES
[facility]             Inflammatory Bowel Disease   Follow-up  Note              TODAY'S VISIT DATE:  5/16/2024    Chief Complaint:   Chief Complaint   Patient presents with    Crohn's Disease     PCP: JUAN Baxter    Previous History:  Harper CAMPA is a 23 y.o. female with pertinent past medical history including oral ulcers, episcleritis (6/2016), erythema nodosum (5247-3057), PCOS, osteopenia.  Patient was doing well until 4/2013 when she had diarrhea, N/V, wt loss, oral ulcers and underwent EGD and colonoscopy with finding c/w Crohn's disease (gastroduodenal, pan-colonic, ileum).  She was initially placed on corticosteroids and remicade from 5654-8128 and was a secondary nonresponder due to developing high abs despite adding 6MP in 2015 (noral TPM 30). In approximately 2014 or 2015 she had erythema nodosum LE and recalls prednisone helped symptoms and she had no recurrence. Even on remicade she feels like there was not a period of deep remission though likely a partial response. She was hospitalized in 2/2016 and underwent EGD significant for endoscopically linear erythema and white plaques in the entire esophagus with bx c/w mild acute esophagitis throughout the esophagus, HP neg moderate chronic active gastritis.  Colonoscopy significant for multiple perianal skin tags and active inflammation throughout the entire colon, ICV and ileum. She was discharged home with steroids (tapered off within 2 mos) and enteral nutrition through NGT. In approximately 2015 she was not compliant with 6MP because difficult to swallow pills due to painful LAD in neck.  She was then started on Humira and continued 6MP 50 mg/d in 3/2016. In 6/2016 she was diagnosed with episcleritis treated with steroid eye drops. In 9/2016 she was optimized to humira 40 mg SC weekly..  In 9/2016 pt started on budesonide 9 mg/d.  By 12/2016 her 6MP was decreased to 25 mg/d due to elevated 6TG 430. In 4/2017  EGD significanat for some focal gastric metaplasia on duodenal biopsies with moderate inflammation rectum, sigmoid, transverse, right colon and mild inflammation left colon. In 1/2017 calprotectin 891 and 2/2017 calprotectin 390. In 2/2017 MRE with diffuse inflammation in cecum and TI.   In 2/2017 humira levels 11.8/Abs neg.  In 3/2017 her 6TG 248/6MMP 2309, humira levels 11.6. In 8/2017 6mP changed from 50 mg/d to alternating 75 mg and 50 mg/d.  In 11/2017 calprotectin >1250 and in 5/2018 1030. In 6/2018 6TG 174, 6MMP 1847 and in 1/2019 6TG 287/6MMP 6350.  8/2018 humira 17.8. Overall she felt humira worked better than remicade and overall seemed to be close to remission.  In 6/2019 EGD normal except for pancreatic rest in the prepyloric area and colonoscopy normal with localized 4 mm inflammatory stricture at the transition zone from ascending to cecum though normal cecal tissue through the stricture.  Unable to evaluate ICV or ileum. In 6/2019 stool calprotectin 179.  MRE 7/2019 significant for circumferential wall thickening with transmural enhancement of the TI and cecum extending to the mid right colon.  Due to stricture she was referred to pediatric surgeon, Dr. Monteiro but surgery was deferred.  In 1/2020 colonoscopy significant for perianal skin tags with pan-colonic inflammation (right colon worse than left colon), previous stricture in proximal ascending improved, ICV stricture with ulceration and friability though unable to pass colonoscopy through stricture with TI inflammation.  In 1/2020 EGD significant for spontaneous antral granularity with oozing and inflammation/erythema of the fundus with normal duodenum. She met Dr. Daniels 2/2020 at which time she had normal BMs with nausea and intermittent vomiting q 2 weeks.  Colonoscopy 3/2020 significant for ulcerated Crohn's disease with moderate stricture in the ascending colon not traversed and tatoo placed with distal colon with mild erythema without any  ulcers and normal sigmoid and rectum. MRE 3/2020 significant for wall thickening of the distal ileum at the ICV with narrowing  with second 2.6 cm ileum with narrowing located 2.4 cm proximal to this area. In 2020 patient underwent laparoscopic right hemicolectomy (resection of TI, ccume, right colon) with surgical path c/w mild chronic active ileitis and colitis with transmural inflammation, focal granuloma, perineural inflammation.  Postoperatively she continued on Humira 40 mg SC weekly and 6MP alternating 50 mg/75 mg/d.  In 2020 EGD normal and colonoscopy significant for mild and patchy apthous ulcers in sigmoid colon with remainder of the colon normal with side to side ileocolonic anastomosis in the transverse colon and anastomosis ulcerated with blind end with ulcer and neoterminal ileum with >5 apthous ulcers c/w Rutgeert's score i2.  Fecal calprotectin 263 and CRP elevated at 1.6.  Humira with 6MP discontinued 2020.  She started stelara 9/10/20. Due to low stelara levels her dose was optimized to 90 mg SC q 4 weeks by 2021.  In 2021 fecal calprotectin 50 and CRP 1.9. She delivered health term baby by  21 and per notes prior to delivery she had slight elevation in her fecal calprotectin.  In 10/2021 EGD significant for LA Grade A esophagitis and colonoscopy significant for large perianal skin tags with side to side ileocolonic anastomosis in the transverse colon that is patent and 2 small ulcers at the ileotransverse anastomosis and normal neoterminal ileum. Dr. Marcum at [facility] referred patient to  2022 at which time she continued on stelara 90 mg SC q 4 weeks though mention of slightly elevated stool calprotectin 181. On 10/7/22 patient received reinduction with stelara IV and then continued on stelara 90 mg SC q 4 weeks.  In 3/2023 stool calprotectin 242 and CRP 21.  In 3/2023 she had wisdom teeth removed and was on intermittent NSAIDs.  On 23  colonoscopy significant for perianal skin tags, normal ileum with ileocolonic anastomotic ulceration, and erythematous/hemorrhagic/inflammed nodular ulcerated mucosa in rectum and sigmoid colon (bx rectum/sigmoid/descending with mild active inflammation). EGD 5/2023 significant for mild gastric fundus and antrum with inflammation (bx HP neg moderate chronic gastritis, rare non-necrotizing granuloma) and normal duodenum. In 6/21/23 patient started on entyvio and subsequently established care with Dr. Grier at [facility] in 7/2023. Stool calprotectin elevated in 7/2023 (145), 10/2023 (719), 11/2023 (293). I met patient initially 1/2024 when she was in her 2nd trimester of pregnancy and she had continued on entyvio q 8 weeks and reported intermittent upper abdominal cramping, RUQ that woke her up in the middle of the night, 2-3 loose to watery BMS/d with mucous and streaks of blood on toilet paper with perianal skin tags, oral ulcers that had resolved but were active 12/2023.  In 1/2024 C diff neg/PCR pos and took 10 day course of oral vancomycin w./o change in symptoms. She had trouble swallowing and mouth sores with had some benefit with magic mouthwash and colgate peroxyl.  Due to symptoms of loose stool, occ notruanl BMS we proceeded with uceris foam (1/23/24-2/20/24) and we planned to check stool calprotectin after course of uceris foam. On 2/22/24 stool calprotectin 579 and pt started on uceris foam qhs followed by medrol dose guy approximately 2/27/24-3/3/24 and repeat stool calprotectin 3/7/24 690. On 3/11/24 she reported dysphagia due to recurrent oral ulcers and 1-2 loose to formed BMs/d with 1 nocturnal BM once a twice/wk and due to this uceris foam increased to BID for 2 weeks but then she self discontinued due to running out and sx stable.  On 4/11/24 she called due to 2 weeks of worsening symptoms with 1-2 loose to formed BMs/d with 1-2 nocturnal BMs each week, blood dripping in the toilet, upper  "abdominal pain, occ nausea and ongoing oral and throat ulcers.  She then started entocort 9 mg/d 4/11/24. With symptomatic improvement.     Interval History:  - current IBD meds: Entyvio 300 mg IV q 8 weeks (started 6/2023, LD 5/9, ND 7/1), entocort 9 mg daily (started 4/11/24)  - recent IBD meds:  uceris foam BID (started 1/23/24-2/20/24, 3/11-approximately 3/25/24), medrol dose guy 2/27/24-3/3/24  - 1-2 loose to formed BM/d (90% formed), 1 nocturnal BMs/week, 25% of bowel movements with urgency, no incontinence, BRB dripping in toilet and on TP (overall less frequent since starting entocort with slight increase in in the last week)  - denies abdominal pain (resolved since last visit)  - mouth sores- flared 5/10/24.  Overall worse compared to last visit but improved today compared to 5/10/24. makes it difficult to eat.  - 36 weeks pregnant with C section scheduled 6/6/24- Last US 4/30/24- baby is growing well with no issues, gained 3 pounds since our last visit in last month  - still has weakness sometimes- unchanged since last visit  - back pain  - feels that her HR is racing and sometimes "hard to breathe"  - 4/19/24: Vitamin B12 level 237- Patient advised to start vit B12 1000 mcg sublingual every other day  - 4/19/24: Iron studies obtained and sent to OB     - 4/26/24: started ferrous sulfate 325 mg daily per OB  - 4/30/24 MFM visit with Dr Millcient Velasco: The pregnancy is progressing well at this time with reassuring maternal and fetal statuses.  Given that she has required steroids for extended periods throughout the pregnancy, recommend stress dosed steroids during active labor and for 24h after delivery to prevent adrenal crisis. No modifications to mode or timing of delivery are recommended.   - 5/9/24: trough VDZ levels- in process  - NSAID use: No  - Narcotic use: No  - Alternative/complementary meds for IBD: No    Prior Pertinent Surgeries:   5/2020 laparoscopic right hemicolectomy (resection of TI, " ccume, right colon) with surgical path c/w mild chronic active ileitis and colitis with transmural inflammation, focal granuloma, perineural inflammation    Last pertinent Endoscopy/Imaging:  3/12/20 MRE: wall thickening of the distal ileum at the level of the ileocecal valve with narrowing of the lumen, with the lumen measuring up to 3 mm. Approximately 2.4 cm proximal to this area is an additional area of circumferential wall thickening measuring approximately 2.6 cm in length with luminal narrowing measuring as narrow as 3 mm. Abnormal motion and lack of peristalsis is noted on the cine images within this area. Restricted diffusion is noted within the bowel wall within this area with minimal perienteric edema   5/12/23 colonoscopy:  perianal skin tags, normal ileum with ileocolonic anastomotic ulceration, and erythematous/hemorrhagic/inflammed nodular ulcerated mucosa in rectum and sigmoid colon (bx rectum/sigmoid/descending with mild active inflammation)  5/12/23 EGD:  mild gastric fundus and antrum with inflammation (bx HP neg moderate chronic gastritis, rare non-necrotizing granuloma) and normal duodenum.    Therapeutic Drug Monitoring Labs:  12/2016 6TG 430  2/2017 humira levels 11.8/Abs neg  3/2017 her 6TG 248/6MMP 2309, humira levels 11.6  6/2018 6TG 174, 6MMP 1847   1/2019 6TG 287/6MMP 6350  8/2018 humira 17.8.    Prior IBD Therapies:  Entocort- 2016  6MP 3744-6076 (12/2016 25 mg/d, 8/2017 alternating 50 mg and 75 mg/d, stopped 9/2020)  Remicade 6959-3945- secondary nonresponder due to antibodies  Humira 40 mg SC weekly (3/2016-9/2020, weekly 9/2016)- secondary nonresponder  Stelara 90 mg SC q 4 weeks (started 9/10/20, 90 mg SC q 4 weeks Feb/March 2021)- effective and possibly had inflammation on scope due to NSAIDs    Vaccinations:  Lab Results   Component Value Date    HEPBSURFABQU POSITIVE 01/09/2024    HEPBSURFABQU 68 01/09/2024     Lab Results   Component Value Date    HEPAIGG Non-reactive 01/09/2024      Lab Results   Component Value Date    VARICELLAZOS 77.38 01/09/2024    VARICELLAINT Negative 01/09/2024     Lab Results   Component Value Date    MUMPSIGGSCRE 63.00 01/09/2024    MUMPSIGGINTE Positive 01/09/2024      Lab Results   Component Value Date    RUBEOLAIGGAN >300.00 01/09/2024    RUBEOLAINTER Positive 01/09/2024     There is no immunization history for the selected administration types on file for this patient.  Flu shot: recommended yearly   COVID vaccine/booster:  per CDC recommendations  RSV: after age 61 yo  Tetanus (Tdap):  4/2032  PPSV 20: 8/2025  HPV: NA     Meningococcal: NA  Hepatitis A: will defer due to pregnancy  Shingrix: will defer due to pregnancy    Review of Systems   Constitutional:  Negative for chills, fever and weight loss.   HENT:          + dysphagia  + oral ulcers   Eyes:  Negative for blurred vision, pain and redness.   Respiratory:  Positive for shortness of breath. Negative for cough.    Cardiovascular:  Negative for chest pain.   Gastrointestinal:  Positive for abdominal pain and nausea. Negative for heartburn and vomiting.   Genitourinary:  Negative for dysuria and hematuria.   Musculoskeletal:  Positive for back pain. Negative for joint pain.   Skin:  Negative for rash.   Psychiatric/Behavioral:  Negative for depression. The patient is not nervous/anxious and does not have insomnia.      All Medical History/Surgical History/Family History/Social History/Allergies have been reviewed and updated in EMR    Outpatient Medications Marked as Taking for the 5/16/24 encounter (Office Visit) with Antonio Montgomery MD   Medication Sig Dispense Refill    budesonide (ENTOCORT EC) 3 mg capsule Take 3 capsules (9 mg total) by mouth once daily. 96 capsule 0    buPROPion (WELLBUTRIN XL) 300 MG 24 hr tablet Take 300 mg by mouth once daily.      cyanocobalamin (VITAMIN B-12) 1000 MCG tablet Take 100 mcg by mouth every other day. 2,000 every other day      ergocalciferol (ERGOCALCIFEROL)  "50,000 unit Cap Take 1 capsule (50,000 Units total) by mouth every 7 days. 4 capsule 5    ferrous sulfate (FEOSOL) 325 mg (65 mg iron) Tab tablet Take 1 tablet by mouth once daily.      ondansetron (ZOFRAN) 4 MG tablet Take 1 tablet (4 mg total) by mouth every 6 (six) hours as needed for Nausea (nausea). 10 tablet 1    pantoprazole (PROTONIX) 40 MG tablet Take 1 tablet (40 mg total) by mouth once daily. (Patient taking differently: Take 40 mg by mouth 2 (two) times daily.) 90 tablet 1    prenatal 21/iron fu/folic acid (PRENATAL COMPLETE ORAL) Take 1 tablet by mouth once daily.      vedolizumab (ENTYVIO) 300 mg SolR injection Inject 300 mg into the vein every 8 weeks.       Vital Signs:  /86 (BP Location: Right arm, Patient Position: Sitting)   Pulse (!) 127   Temp 98.4 °F (36.9 °C)   Ht 5' 7" (1.702 m)   Wt 94.1 kg (207 lb 7.3 oz)   SpO2 99%   BMI 32.49 kg/m²    Physical Exam  HENT:      Mouth/Throat:      Comments: Few apthous ulcers on gum, no oropharyngeal ulcers  Cardiovascular:      Pulses: Normal pulses.      Heart sounds: Normal heart sounds.   Pulmonary:      Effort: Pulmonary effort is normal. No respiratory distress.      Breath sounds: Normal breath sounds.   Abdominal:      General: Bowel sounds are normal. There is no distension.      Palpations: Abdomen is soft.      Tenderness: There is no abdominal tenderness.   Musculoskeletal:      Right lower leg: No edema.      Left lower leg: No edema.   Labs:   Lab Results   Component Value Date    CRP 34.5 (H) 01/09/2024    CALPROTECTIN 690.0 (H) 03/07/2024     Lab Results   Component Value Date    HEPBSAG Non-reactive 01/09/2024    HEPBCAB Non-reactive 01/09/2024     Lab Results   Component Value Date    TBGOLDPLUS Negative 01/09/2024     Lab Results   Component Value Date    DOHOFDQZ09FL 41 03/23/2023    QBBXSVMY55 237 04/19/2024     Lab Results   Component Value Date    WBC 11.74 03/28/2024    HGB 10.7 (L) 03/28/2024    HCT 33.1 (L) " 2024    MCV 86 2024     2024     Lab Results   Component Value Date    CREATININE 0.35 (L) 2024    ALBUMIN 3.3 (L) 2024    BILITOT 0.1 (L) 2024    ALKPHOS 96 2024    AST 23 2024    ALT 20 2024     Assessment/Plan:  Harper CAMPA is a 23 y.o. female with Crohn's disease with ileo-transverse anastomosis (gastroduodenal, ileum, pancolonic S/P ileocolonic resection due to stricture) and history of EIMS including oral ulcers, erythema nodosum and episcleritis, history of C diff (2024- oral vancomycin).    Patient feels improvement since starting entocort 24.   Her GI symptoms are stable compared to last visit on 24 with the exception of reported mouth ulcer flare on 5/10/24, which is improving.  No throat ulcers noted on physical exam today.  Patient will complete the 8 week course of entocort on 24.  Her  is scheduled 24. Next dose of Entyvio is tentatively scheduled 24.  Will proceed with MRE/EGD/colonoscopy 2024 after she delivers.      # Crohn's disease with ileo-transverse anastomosis (gastroduodenal, ileum, pancolonic S/P ileocolonic resection due to stricture):  - continue entocort 9 mg daily- will complete 8 week course 24  - entyvio q 8 weeks- will touch base 2 weeks post  to confirm if appropriate to proceed with next infusion on 24   - salt water gargles for mouth ulcers  - stool calprotectin on 24  - colonoscopy and MRE 2024  - drug monitoring labs: CBC/CMP q 6 mos (2024), TPMT (normal 2024), TB quantiferon (2025), Hep B testing (2025)  - TDM: 24- trough VDZ level in process    # Anemia  - patient started vit B12 1000 mcg sublingual every other day on 24 and ferrous sulfate 325 mg daily on 24  - ongoing but stable H/H being monitored closely by OB  - will recheck CBC, iron studies, and B12 after scope on 2024    # Pregnancy- 36 weeks pregnant:  - OB Sonny Lundberg  (office # [phone])  - no live vaccines for  for first 6 mos of life though may be considered given entyvio and pt will check with pediatrician  - entyvio safe during pregnancy and during breast feeding  - entyvio last dose prior to scheduled  was 24  - scheduled  24  - defer stress dose steroids to OB though unclear if needed given pt is on entocort and only had medrol dose guy  - pharmacist will touch base with patient 2 weeks post-op to assess wound healing and appropriateness of next Entyvio infusion scheduled 24    # Immunodeficiency due to long term immunosuppressive drug therapy and IBD specific health maintenance:  CRC risk- sx 2013, pancolonic, surveillance colonoscopy q 1-2 years   Skin exam- yearly   Risk for osteopenia/osteoporosis- none  Pap smear- yearly- defer to OB/GYN  Vitamin D- normal 3/2023  Vaccines- no live vaccines, hep A and shingrix after delivery but pt will also make sure okay with pediatrician since she may be breastfeeding    Visit today is associated with current or anticipated ongoing medical care related to this patient's single serious condition/complex condition crohn's disease in setting of pregnancy.     I personally examined the patient and discussed above plan in collaboration with Teri Franklin PharmD.      Follow up in about 10 weeks (around 2024) for MD Rdz .    Antonio Montgomery MD  Department of Gastroenterology  Medical Director, Inflammatory Bowel Disease

## 2024-05-16 NOTE — PROGRESS NOTES
IBD PATIENT INTAKE:    Confirm current PCP: JUAN Baxter  If no PCP-  number given to establish [phone]: N/A    IBD THERAPY (name, dose/frequency):  Entyvio  Last dose:  05/09/2024    Next dose:  07/01/2024    Antibiotics (past 30 Days):  No  If yes   Indication:  Name of antibiotic:  Completion date:      Answers submitted by the patient for this visit:  Established Patient Questionnaire  (Submitted on 5/13/2024)  mouth sores: Yes  trouble swallowing: Yes  nausea: Yes  heartburn: Yes

## 2024-05-16 NOTE — PATIENT INSTRUCTIONS
Salt water gargles  Continue entocort as prescribed (through 6/5/24)  Message us 2 weeks after baby is delivered so we can confirm Entyvio infusion 7/1/24 is appropriate  Scopes and MRI 7/8/24  Turn in stool test 7/1/24   B armrests of chair to scoot fwd to front edge of chair in anticipation of standing

## 2024-05-18 LAB
VEDOLIZUMAB AB SERPL IA-MCNC: <25 NG/ML
VEDOLIZUMAB SERPL IA-MCNC: 3.1 UG/ML

## 2024-05-22 DIAGNOSIS — K50.818 CROHN'S DISEASE OF BOTH SMALL AND LARGE INTESTINE WITH OTHER COMPLICATION: ICD-10-CM

## 2024-05-23 ENCOUNTER — TELEPHONE (OUTPATIENT)
Dept: GASTROENTEROLOGY | Facility: CLINIC | Age: 24
End: 2024-05-23
Payer: COMMERCIAL

## 2024-05-23 RX ORDER — PANTOPRAZOLE SODIUM 40 MG/1
40 TABLET, DELAYED RELEASE ORAL 2 TIMES DAILY
Qty: 180 TABLET | Refills: 3 | Status: SHIPPED | OUTPATIENT
Start: 2024-05-23

## 2024-05-23 NOTE — TELEPHONE ENCOUNTER
----- Message from Antonio Montgomery MD sent at 5/23/2024  2:51 PM CDT -----  Odd, she is in [place] practicing and I assumed her care. Do you mind changing to my name and confirming with pt that she is on BID or daily?      Thank you!    SS  ----- Message -----  From: Eugenia Skaggs RN  Sent: 5/23/2024   7:43 AM CDT  To: MD Dr. Marvel Singh sent a new Rx today (5/23/24) for BID with refill x 1 yr.  ----- Message -----  From: Antonio Montgomery MD  Sent: 5/23/2024   7:21 AM CDT  To: Ulises Alvarez Staff    Discrepancy in chart on protonix. Can you check with pt if she is taking pantoprazole 40 mg daily or BID.  May need to correct the prescription.     SS

## 2024-05-23 NOTE — TELEPHONE ENCOUNTER
----- Message from Antonio Montgomery MD sent at 2024  2:27 PM CDT -----  Her entyvio levels are really low and we will need to optimize to get levels closer to 13. I anticipate she will need entyvio q 4 weeks after delivery with  . Please discuss with patient and start authorization and keep me informed. Thanks SS

## 2024-05-23 NOTE — TELEPHONE ENCOUNTER
- Patient with Crohn's disease (gastroduodenal, pan-colonic, ileum) currently treated with Entyvio 300 mg IV q 8 weeks (started 6/2023, LD 5/9, ND pending)  - 2/22/24 stool calprotectin 579 + patient was symptomatic requiring uceris foam followed by medrol dose pack  - 3/7/24 calprotectin 690  - 4/11/24 had worsening symptoms requiring entocort course x 8 weeks  - 5/9/24 VDZ trough resulted subtherapeutic (3.1 without ab)  - Plan for Entyvio dose optimization from 300 mg IV every 8 weeks to 300 mg IV every 4 weeks to get level closer to 13  -  Note: Patient is 37 weeks pregnant with C section scheduled 6/6/24- will need clearance before next dose  - CBC + CMP 3/2024, next 9/2024; TB and HepB neg 1/2024, repeat 1/2025    - Called patient to discuss and review plan of care- FLORENCE. Krzysztof sent.   - Updated therapy plan for Entyvio 300 mg IV q 4 weeks sent to [facility].

## 2024-05-23 NOTE — TELEPHONE ENCOUNTER
Spoke with pt  - Reviewed plan of care to optimize Entyvio from 300 mg q8 weeks to 300 mg q4 weeks based upon subtherapeutic drug level  - Patient confirmed her  date is still scheduled for 24- pharmacist will call patient 2 weeks post-op to confirm recovery and no complications before next Entyvio infusion  - Patient had no questions or concerns

## 2024-05-23 NOTE — TELEPHONE ENCOUNTER
Spoke with Harper:  - confirms she is taking Protonix 40mg BID  - med list reflects this    Dr. Montgomery will be updated.

## 2024-06-03 ENCOUNTER — PATIENT MESSAGE (OUTPATIENT)
Dept: GASTROENTEROLOGY | Facility: CLINIC | Age: 24
End: 2024-06-03
Payer: COMMERCIAL

## 2024-06-06 ENCOUNTER — PATIENT MESSAGE (OUTPATIENT)
Dept: GASTROENTEROLOGY | Facility: CLINIC | Age: 24
End: 2024-06-06
Payer: COMMERCIAL

## 2024-06-20 ENCOUNTER — TELEPHONE (OUTPATIENT)
Dept: GASTROENTEROLOGY | Facility: CLINIC | Age: 24
End: 2024-06-20
Payer: COMMERCIAL

## 2024-06-20 NOTE — TELEPHONE ENCOUNTER
Spoke with pt  - she is recovering well from 24    - reports that incision site has healed well with no post-op complications  - denied fever or drainage   - has OB apt tomorrow at noon  - If appropriate based upon appt tomorrow, reviewed plan for Entyvio infusion on 24 followed by q4 week dosing moving forward and reminded pt to turn in stool yuliana week of 24   - will call pt after her OB appt to confirm no issues noted at post-partum appt

## 2024-06-21 NOTE — TELEPHONE ENCOUNTER
Spoke with pt  - Entyvio LD: 24   -  completed:  24   - ND currently scheduled 24      - Today, her OB confirmed that she has healed well post-op and is cleared to receive Entyvio infusion 24  - Pt has no no signs or symptoms of infection- no fever or drainage   - Confirmed plan for patient to reiceve Entyvio infusion 24 and then start q4 week dosing moving forward (already approved at [facility])  - Pt will turn in stool calpro 24    - Pt stated she normally uses dicyclomine for stomach cramps but given dicyclomine is contraindicated in breastfeeding, she is seeking alternative    - Patient denied any significant changes in GI symptoms since last OV (still having 1-2 loose to formed BM/d)  - She said she gets occasional stomach cramps- they are painful (rated 4-5/10). No trigger (doesn't worsen with food).    Discussed with Dr Montgomery  - will do research and get back with patient early next week  - advised pt she can also check with pediatrician  - patient agreed to the plan

## 2024-06-24 ENCOUNTER — PATIENT MESSAGE (OUTPATIENT)
Dept: GASTROENTEROLOGY | Facility: CLINIC | Age: 24
End: 2024-06-24
Payer: COMMERCIAL

## 2024-06-27 ENCOUNTER — PATIENT MESSAGE (OUTPATIENT)
Dept: GASTROENTEROLOGY | Facility: CLINIC | Age: 24
End: 2024-06-27
Payer: COMMERCIAL

## 2024-06-27 ENCOUNTER — TELEPHONE (OUTPATIENT)
Dept: ENDOSCOPY | Facility: HOSPITAL | Age: 24
End: 2024-06-27
Payer: COMMERCIAL

## 2024-07-01 ENCOUNTER — LAB VISIT (OUTPATIENT)
Dept: LAB | Facility: HOSPITAL | Age: 24
End: 2024-07-01
Attending: INTERNAL MEDICINE
Payer: COMMERCIAL

## 2024-07-01 DIAGNOSIS — K50.818 CROHN'S DISEASE OF BOTH SMALL AND LARGE INTESTINE WITH OTHER COMPLICATION: ICD-10-CM

## 2024-07-01 PROCEDURE — 83993 ASSAY FOR CALPROTECTIN FECAL: CPT | Performed by: INTERNAL MEDICINE

## 2024-07-03 ENCOUNTER — TELEPHONE (OUTPATIENT)
Dept: ENDOSCOPY | Facility: HOSPITAL | Age: 24
End: 2024-07-03
Payer: COMMERCIAL

## 2024-07-03 ENCOUNTER — TELEPHONE (OUTPATIENT)
Dept: GASTROENTEROLOGY | Facility: CLINIC | Age: 24
End: 2024-07-03
Payer: COMMERCIAL

## 2024-07-03 NOTE — TELEPHONE ENCOUNTER
Spoke to py to reschedule procedure(s) Colonoscopy/EGD       Physician to perform procedure(s) Dr. ERWIN Montgomery  Date of Procedure (s) 7/30/24  Arrival Time 12:20 PM  Time of Procedure(s) 1:20 PM   Location of Procedure(s) [place] [address] Floor  Type of Rx Prep sent to patient: Miralax  Instructions provided to patient via [facility]    Patient was informed on the following information and verbalized understanding. Screening questionnaire reviewed with patient and complete. If procedure requires anesthesia, a responsible adult needs to be present to accompany the patient home, patient cannot drive after receiving anesthesia. Appointment details are tentative, especially check-in time. Patient will receive a prep-op call 7 days prior to confirm check-in time for procedure. If applicable the patient should contact their pharmacy to verify Rx for procedure prep is ready for pick-up. Patient was advised to call the scheduling department at [phone] if pharmacy states no Rx is available. Patient was advised to call the endoscopy scheduling department if any questions or concerns arise.      [facility] Scheduling Department

## 2024-07-03 NOTE — TELEPHONE ENCOUNTER
----- Message from Eugenia Skaggs RN sent at 7/3/2024  9:38 AM CDT -----  Pls call pt to reschedule 7/18/24 OV to 2nd week of Aug. Pls reschedule MRI 7/8 to same day as scope 7/30 - after scope after 4pm. She should keep lab appt as is. Thx!

## 2024-07-05 LAB — CALPROTECTIN STL-MCNT: 151 MCG/G

## 2024-07-23 ENCOUNTER — TELEPHONE (OUTPATIENT)
Dept: ENDOSCOPY | Facility: HOSPITAL | Age: 24
End: 2024-07-23
Payer: COMMERCIAL

## 2024-07-23 NOTE — TELEPHONE ENCOUNTER
Spoke to patient for precall to confirm scheduled procedure(s) Colonoscopy       Date of Procedure (s)  verified 7/30/24  Arrival Time 12:20 PM verified.  Location of Procedure(s) [place] 4th Floor verified.    NPO status reinforced. Ok to continue clear liquids up until 4 hours prior to the Endoscopy procedure.   Pt confirmed receipt of Rx prep and prep instructions.  Instructions provided to patient via [facility]  Pt confirmed ride home after procedure.   If procedure requires anesthesia, a responsible adult needs to be present to accompany the patient home, patient cannot drive after receiving anesthesia.    Patient was informed on the following information and verbalized understanding. Precall Screening questionnaire reviewed  and completed with patient. Appointment details are tentative, including check-in time.  If you begin taking any blood thinning medications, injectable weight loss/diabetes medications (other than insulin) , or Adipex (Phentermine) please contact the endoscopy scheduling department listed below as soon as possible.  Patient was advised to call the endoscopy scheduling department if any questions or concerns arise. Pt verbalized understanding.      Endoscopy Scheduling Department

## 2024-07-25 ENCOUNTER — PATIENT MESSAGE (OUTPATIENT)
Dept: GASTROENTEROLOGY | Facility: CLINIC | Age: 24
End: 2024-07-25
Payer: COMMERCIAL

## 2024-07-30 ENCOUNTER — HOSPITAL ENCOUNTER (OUTPATIENT)
Facility: HOSPITAL | Age: 24
Discharge: HOME | End: 2024-07-30
Attending: INTERNAL MEDICINE | Admitting: INTERNAL MEDICINE
Payer: COMMERCIAL

## 2024-07-30 ENCOUNTER — ANESTHESIA EVENT (OUTPATIENT)
Dept: ENDOSCOPY | Facility: HOSPITAL | Age: 24
End: 2024-07-30
Payer: COMMERCIAL

## 2024-07-30 ENCOUNTER — ANESTHESIA (OUTPATIENT)
Dept: ENDOSCOPY | Facility: HOSPITAL | Age: 24
End: 2024-07-30
Payer: COMMERCIAL

## 2024-07-30 VITALS
TEMPERATURE: 98 F | SYSTOLIC BLOOD PRESSURE: 103 MMHG | HEART RATE: 80 BPM | HEIGHT: 67 IN | WEIGHT: 180 LBS | BODY MASS INDEX: 28.25 KG/M2 | OXYGEN SATURATION: 99 % | DIASTOLIC BLOOD PRESSURE: 67 MMHG | RESPIRATION RATE: 16 BRPM

## 2024-07-30 DIAGNOSIS — K50.90 CROHN DISEASE: ICD-10-CM

## 2024-07-30 DIAGNOSIS — K50.818 CROHN'S DISEASE OF BOTH SMALL AND LARGE INTESTINE WITH OTHER COMPLICATION: Primary | ICD-10-CM

## 2024-07-30 LAB
B-HCG UR QL: NEGATIVE
CTP QC/QA: YES

## 2024-07-30 PROCEDURE — 37000008 HC ANESTHESIA 1ST 15 MINUTES: Performed by: INTERNAL MEDICINE

## 2024-07-30 PROCEDURE — 88342 IMHCHEM/IMCYTCHM 1ST ANTB: CPT | Performed by: PATHOLOGY

## 2024-07-30 PROCEDURE — 43235 EGD DIAGNOSTIC BRUSH WASH: CPT | Performed by: INTERNAL MEDICINE

## 2024-07-30 PROCEDURE — 63600175 PHARM REV CODE 636 W HCPCS: Performed by: NURSE ANESTHETIST, CERTIFIED REGISTERED

## 2024-07-30 PROCEDURE — 25000003 PHARM REV CODE 250: Performed by: NURSE ANESTHETIST, CERTIFIED REGISTERED

## 2024-07-30 PROCEDURE — 45380 COLONOSCOPY AND BIOPSY: CPT | Mod: ,,, | Performed by: INTERNAL MEDICINE

## 2024-07-30 PROCEDURE — 81025 URINE PREGNANCY TEST: CPT | Performed by: INTERNAL MEDICINE

## 2024-07-30 PROCEDURE — 88305 TISSUE EXAM BY PATHOLOGIST: CPT | Performed by: PATHOLOGY

## 2024-07-30 PROCEDURE — 88312 SPECIAL STAINS GROUP 1: CPT | Performed by: PATHOLOGY

## 2024-07-30 PROCEDURE — 25000003 PHARM REV CODE 250: Performed by: INTERNAL MEDICINE

## 2024-07-30 PROCEDURE — 45380 COLONOSCOPY AND BIOPSY: CPT | Performed by: INTERNAL MEDICINE

## 2024-07-30 PROCEDURE — 43235 EGD DIAGNOSTIC BRUSH WASH: CPT | Mod: 51,,, | Performed by: INTERNAL MEDICINE

## 2024-07-30 PROCEDURE — 27201012 HC FORCEPS, HOT/COLD, DISP: Performed by: INTERNAL MEDICINE

## 2024-07-30 PROCEDURE — 37000009 HC ANESTHESIA EA ADD 15 MINS: Performed by: INTERNAL MEDICINE

## 2024-07-30 RX ORDER — LIDOCAINE HYDROCHLORIDE 20 MG/ML
INJECTION INTRAVENOUS
Status: DISCONTINUED | OUTPATIENT
Start: 2024-07-30 | End: 2024-07-30

## 2024-07-30 RX ORDER — PROPOFOL 10 MG/ML
VIAL (ML) INTRAVENOUS
Status: DISCONTINUED | OUTPATIENT
Start: 2024-07-30 | End: 2024-07-30

## 2024-07-30 RX ORDER — PROPOFOL 10 MG/ML
INJECTION, EMULSION INTRAVENOUS CONTINUOUS PRN
Status: DISCONTINUED | OUTPATIENT
Start: 2024-07-30 | End: 2024-07-30

## 2024-07-30 RX ORDER — SODIUM CHLORIDE 9 MG/ML
INJECTION, SOLUTION INTRAVENOUS CONTINUOUS
Status: DISCONTINUED | OUTPATIENT
Start: 2024-07-30 | End: 2024-07-30 | Stop reason: HOSPADM

## 2024-07-30 RX ORDER — SULFAMETHOXAZOLE AND TRIMETHOPRIM 800; 160 MG/1; MG/1
1 TABLET ORAL
COMMUNITY

## 2024-07-30 RX ADMIN — PROPOFOL 60 MG: 10 INJECTION, EMULSION INTRAVENOUS at 01:07

## 2024-07-30 RX ADMIN — SODIUM CHLORIDE: 0.9 INJECTION, SOLUTION INTRAVENOUS at 12:07

## 2024-07-30 RX ADMIN — PROPOFOL 225 MCG/KG/MIN: 10 INJECTION, EMULSION INTRAVENOUS at 01:07

## 2024-07-30 RX ADMIN — PROPOFOL 30 MG: 10 INJECTION, EMULSION INTRAVENOUS at 01:07

## 2024-07-30 RX ADMIN — PROPOFOL 200 MG: 10 INJECTION, EMULSION INTRAVENOUS at 01:07

## 2024-07-30 RX ADMIN — LIDOCAINE HYDROCHLORIDE 100 MG: 20 INJECTION INTRAVENOUS at 01:07

## 2024-07-30 NOTE — TRANSFER OF CARE
Anesthesia Transfer of Care Note    Patient: Harper CAMPA    Procedure(s) Performed: Procedure(s) (LRB):  COLONOSCOPY (N/A)  EGD (ESOPHAGOGASTRODUODENOSCOPY) (N/A)    Patient location: GI    Anesthesia Type: general    Transport from OR: Transported from OR on room air with adequate spontaneous ventilation    Post pain: adequate analgesia    Post assessment: no apparent anesthetic complications    Post vital signs: stable    Level of consciousness: responds to stimulation and sedated    Nausea/Vomiting: no nausea/vomiting    Complications: none    Transfer of care protocol was followed      Last vitals: Visit Vitals  BP 99/58   Pulse 96   Temp 97.3   Resp 16   Ht    Wt    SpO2 99%   Breastfeeding    BMI

## 2024-07-30 NOTE — ANESTHESIA PREPROCEDURE EVALUATION
2024  Harper CAMPA is a 23 y.o., female.    Past Medical History:   Diagnosis Date    Crohn's disease with ileo-transverse anastomosis (gastroduodenal, pancolonic, ileum S/P ileal/right colon resection due to stricture)     History of episcleritis 2024    History of Erythema nodosum     Hypertension     Immunodeficiency due to long term immunosuppressive drug therapy     Oral ulcer      Patient Active Problem List   Diagnosis    HTN complicating peripregnancy, antepartum, third trimester    Perineal varicose veins during pregnancy, antepartum    Crohn's disease with ileo-transverse anastomosis (gastroduodenal, pancolonic, ileum S/P ileal/right colon resection due to stricture)    Oral ulcer    History of enteropathic arthropathy    History of Erythema nodosum    24 weeks gestation of pregnancy    Immunodeficiency due to long term immunosuppressive drug therapy    History of episcleritis     delivery, delivered, current hospitalization     Social History     Socioeconomic History    Marital status:    Tobacco Use    Smoking status: Never    Smokeless tobacco: Never   Substance and Sexual Activity    Alcohol use: Never    Drug use: Never    Sexual activity: Yes     Partners: Male     Social Determinants of Health     Financial Resource Strain: Low Risk  (2024)    Received from [facility]    Overall Financial Resource Strain (CARDIA)     Difficulty of Paying Living Expenses: Not very hard   Food Insecurity: No Food Insecurity (2024)    Hunger Vital Sign     Worried About Running Out of Food in the Last Year: Never true     Ran Out of Food in the Last Year: Never true   Transportation Needs: No Transportation Needs (2024)    TRANSPORTATION NEEDS     Transportation : No   Physical Activity: Patient Declined (2024)    [facility]    Exercise Vital Sign     Days of Exercise per Week: Patient declined     Minutes of Exercise per Session: Patient declined   Stress: No Stress Concern Present (2024)    Received from [facility]    [employer] [place] [employer] - Occupational Stress Questionnaire     Feeling of Stress : Not at all   Housing Stability: Low Risk  (2024)    Housing Stability Vital Sign     Unable to Pay for Housing in the Last Year: No     Homeless in the Last Year: No     Past Surgical History:   Procedure Laterality Date    BOWEL RESECTION  2020    portion of large intestine removed     SECTION N/A 2021    Procedure:  SECTION;  Surgeon: Sonny Lundberg MD;  Location: [facility] L&D;  Service: OB/GYN;  Laterality: N/A;     SECTION N/A 2024    Procedure:  SECTION;  Surgeon: Sonny Lundberg MD;  Location: [facility] L&D;  Service: OB/GYN;  Laterality: N/A;    COLONOSCOPY N/A 2023    Procedure: COLONOSCOPY;  Surgeon: John Conklin MD;  Location: [facility];  Service: Endoscopy;  Laterality: N/A;    ESOPHAGOGASTRODUODENOSCOPY N/A 2023    Procedure: EGD (ESOPHAGOGASTRODUODENOSCOPY);  Surgeon: John Conklin MD;  Location: [facility];  Service: Endoscopy;  Laterality: N/A;     No current facility-administered medications on file prior to encounter.     Current Outpatient Medications on File Prior to Encounter   Medication Sig Dispense Refill    buPROPion (WELLBUTRIN XL) 300 MG 24 hr tablet Take 300 mg by mouth once daily.      ergocalciferol (ERGOCALCIFEROL) 50,000 unit Cap Take 1 capsule (50,000 Units total) by mouth every 7 days. 4 capsule 5    prenatal 21/iron fu/folic acid (PRENATAL COMPLETE ORAL) Take 1 tablet by mouth once daily.      sulfamethoxazole-trimethoprim 800-160mg (BACTRIM DS) 800-160 mg Tab Take 1 tablet by mouth every 12 (twelve) hours.      ondansetron (ZOFRAN) 4 MG tablet Take 1 tablet (4 mg total) by  mouth every 6 (six) hours as needed for Nausea (nausea). 10 tablet 1    vedolizumab (ENTYVIO) 300 mg SolR injection Inject 300 mg into the vein every 8 weeks.       Pre-op Assessment    I have reviewed the NPO Status.      Review of Systems  Anesthesia Hx:               Denies Personal Hx of Anesthesia complications.                    Cardiovascular:     Hypertension (during pregnancy, not on any meds now)                                        Pulmonary:  Pulmonary Normal                       Renal/:  Renal/ Normal                 Hepatic/GI:     Bowel Conditions:  Inflammatory Bowel Disease, Crohns        Neurological:  Neurology Normal                                      Endocrine:  Endocrine Normal                Physical Exam    Airway:  Mallampati: I   Neck ROM: Normal ROM        Anesthesia Plan  Type of Anesthesia, risks & benefits discussed:    Anesthesia Type: Gen Natural Airway  Intra-op Monitoring Plan: Standard ASA Monitors  Induction:  IV  Informed Consent: Informed consent signed with the Patient and all parties understand the risks and agree with anesthesia plan.  All questions answered.   ASA Score: 2    Ready For Surgery From Anesthesia Perspective.     .

## 2024-07-30 NOTE — ANESTHESIA POSTPROCEDURE EVALUATION
Anesthesia Post Evaluation    Patient: Harper CAMPA    Procedure(s) Performed: Procedure(s) (LRB):  COLONOSCOPY (N/A)  EGD (ESOPHAGOGASTRODUODENOSCOPY) (N/A)    Final Anesthesia Type: general      Patient location during evaluation: GI PACU  Patient participation: Yes- Able to Participate  Level of consciousness: awake and alert and oriented  Post-procedure vital signs: reviewed and stable  Pain management: adequate  Airway patency: patent    PONV status at discharge: No PONV  Anesthetic complications: no      Cardiovascular status: hemodynamically stable  Respiratory status: unassisted, spontaneous ventilation and room air  Hydration status: euvolemic  Follow-up not needed.              Vitals Value Taken Time   /64 07/30/24 1432   Temp 36.7 °C (98 °F) 07/30/24 1419   Pulse 75 07/30/24 1432   Resp 16 07/30/24 1432   SpO2 98 % 07/30/24 1432         No case tracking events are documented in the log.      Pain/Bhavik Score: Bhavik Score: 10 (7/30/2024  2:19 PM)

## 2024-08-02 LAB
FINAL PATHOLOGIC DIAGNOSIS: NORMAL
GROSS: NORMAL
Lab: NORMAL

## 2024-08-07 ENCOUNTER — TELEPHONE (OUTPATIENT)
Dept: GASTROENTEROLOGY | Facility: CLINIC | Age: 24
End: 2024-08-07
Payer: COMMERCIAL

## 2024-08-13 ENCOUNTER — TELEPHONE (OUTPATIENT)
Dept: GASTROENTEROLOGY | Facility: CLINIC | Age: 24
End: 2024-08-13
Payer: COMMERCIAL

## 2024-08-13 ENCOUNTER — PATIENT MESSAGE (OUTPATIENT)
Dept: GASTROENTEROLOGY | Facility: CLINIC | Age: 24
End: 2024-08-13

## 2024-08-13 ENCOUNTER — OFFICE VISIT (OUTPATIENT)
Dept: GASTROENTEROLOGY | Facility: CLINIC | Age: 24
End: 2024-08-13
Payer: COMMERCIAL

## 2024-08-13 ENCOUNTER — HOSPITAL ENCOUNTER (OUTPATIENT)
Dept: RADIOLOGY | Facility: HOSPITAL | Age: 24
Discharge: HOME | End: 2024-08-13
Attending: INTERNAL MEDICINE
Payer: COMMERCIAL

## 2024-08-13 VITALS
SYSTOLIC BLOOD PRESSURE: 127 MMHG | DIASTOLIC BLOOD PRESSURE: 82 MMHG | WEIGHT: 182.13 LBS | OXYGEN SATURATION: 99 % | BODY MASS INDEX: 28.58 KG/M2 | TEMPERATURE: 98 F | HEIGHT: 67 IN | HEART RATE: 108 BPM

## 2024-08-13 DIAGNOSIS — K50.818 CROHN'S DISEASE OF BOTH SMALL AND LARGE INTESTINE WITH OTHER COMPLICATION: Primary | ICD-10-CM

## 2024-08-13 DIAGNOSIS — R74.01 ELEVATED ALT MEASUREMENT: Primary | ICD-10-CM

## 2024-08-13 DIAGNOSIS — K50.818 CROHN'S DISEASE OF BOTH SMALL AND LARGE INTESTINE WITH OTHER COMPLICATION: ICD-10-CM

## 2024-08-13 PROCEDURE — A9585 GADOBUTROL INJECTION: HCPCS | Performed by: INTERNAL MEDICINE

## 2024-08-13 PROCEDURE — 1160F RVW MEDS BY RX/DR IN RCRD: CPT | Mod: CPTII,S$GLB,, | Performed by: INTERNAL MEDICINE

## 2024-08-13 PROCEDURE — 1159F MED LIST DOCD IN RCRD: CPT | Mod: CPTII,S$GLB,, | Performed by: INTERNAL MEDICINE

## 2024-08-13 PROCEDURE — 25500020 PHARM REV CODE 255: Performed by: INTERNAL MEDICINE

## 2024-08-13 PROCEDURE — 72197 MRI PELVIS W/O & W/DYE: CPT | Mod: TC

## 2024-08-13 PROCEDURE — G2211 COMPLEX E/M VISIT ADD ON: HCPCS | Mod: S$GLB,,, | Performed by: INTERNAL MEDICINE

## 2024-08-13 PROCEDURE — 3079F DIAST BP 80-89 MM HG: CPT | Mod: CPTII,S$GLB,, | Performed by: INTERNAL MEDICINE

## 2024-08-13 PROCEDURE — 74183 MRI ABD W/O CNTR FLWD CNTR: CPT | Mod: 26,,, | Performed by: RADIOLOGY

## 2024-08-13 PROCEDURE — 99215 OFFICE O/P EST HI 40 MIN: CPT | Mod: S$GLB,,, | Performed by: INTERNAL MEDICINE

## 2024-08-13 PROCEDURE — 3008F BODY MASS INDEX DOCD: CPT | Mod: CPTII,S$GLB,, | Performed by: INTERNAL MEDICINE

## 2024-08-13 PROCEDURE — 3074F SYST BP LT 130 MM HG: CPT | Mod: CPTII,S$GLB,, | Performed by: INTERNAL MEDICINE

## 2024-08-13 PROCEDURE — 74183 MRI ABD W/O CNTR FLWD CNTR: CPT | Mod: TC

## 2024-08-13 PROCEDURE — 72197 MRI PELVIS W/O & W/DYE: CPT | Mod: 26,,, | Performed by: RADIOLOGY

## 2024-08-13 RX ORDER — GADOBUTROL 604.72 MG/ML
9 INJECTION INTRAVENOUS
Status: COMPLETED | OUTPATIENT
Start: 2024-08-13 | End: 2024-08-13

## 2024-08-13 RX ADMIN — GADOBUTROL 9 ML: 604.72 INJECTION INTRAVENOUS at 02:08

## 2024-08-13 NOTE — TELEPHONE ENCOUNTER
Called pt about cancelled appt. Stated that she rescheduled due to running late from traveling an hour and a half. Asked to still be seen today. If not can't return until sometime in October.

## 2024-08-13 NOTE — PROGRESS NOTES
[facility]             Inflammatory Bowel Disease   Follow-up  Note              TODAY'S VISIT DATE:  8/13/2024    Chief Complaint:   Chief Complaint   Patient presents with    Crohn's Disease     PCP: JUAN Baxter    Previous History:  Harper CAMPA is a 23 y.o. female  Crohn's disease with ileo-transverse anastomosis (gastroduodenal, ileum, pancolonic S/P ileocolonic resection due to stricture) and history of EIMS including oral ulcers, episcleritis (6/2016), erythema nodosum (2449-5414), PCOS, osteopenia, history of C diff (1/2024- oral vancomycin). She was doing well until 4/2013 when she had diarrhea, N/V, wt loss, oral ulcers and underwent EGD and colonoscopy with finding c/w Crohn's disease (gastroduodenal, pan-colonic, ileum).  She was initially placed on corticosteroids and remicade from 5202-1343 and was a secondary nonresponder due to developing high abs despite adding 6MP in 2015 (noral TPM 30). In approximately 2014 or 2015 she had erythema nodosum LE and recalls prednisone helped symptoms and she had no recurrence. Even on remicade she feels like there was not a period of deep remission though likely a partial response. She was hospitalized in 2/2016 and underwent EGD significant for endoscopically linear erythema and white plaques in the entire esophagus with bx c/w mild acute esophagitis throughout the esophagus, HP neg moderate chronic active gastritis.  Colonoscopy significant for multiple perianal skin tags and active inflammation throughout the entire colon, ICV and ileum. She was discharged home with steroids (tapered off within 2 mos) and enteral nutrition through NGT. In approximately 2015 she was not compliant with 6MP because difficult to swallow pills due to painful LAD in neck.  She was then started on Humira and continued 6MP 50 mg/d in 3/2016. In 6/2016 she was diagnosed with episcleritis treated with steroid eye drops. In 9/2016 she was optimized to  humira 40 mg SC weekly..  In 9/2016 pt started on budesonide 9 mg/d.  By 12/2016 her 6MP was decreased to 25 mg/d due to elevated 6TG 430. In 4/2017 EGD significanat for some focal gastric metaplasia on duodenal biopsies with moderate inflammation rectum, sigmoid, transverse, right colon and mild inflammation left colon. In 1/2017 calprotectin 891 and 2/2017 calprotectin 390. In 2/2017 MRE with diffuse inflammation in cecum and TI.   In 2/2017 humira levels 11.8/Abs neg.  In 3/2017 her 6TG 248/6MMP 2309, humira levels 11.6. In 8/2017 6mP changed from 50 mg/d to alternating 75 mg and 50 mg/d.  In 11/2017 calprotectin >1250 and in 5/2018 1030. In 6/2018 6TG 174, 6MMP 1847 and in 1/2019 6TG 287/6MMP 6350.  8/2018 humira 17.8. Overall she felt humira worked better than remicade and overall seemed to be close to remission.  In 6/2019 EGD normal except for pancreatic rest in the prepyloric area and colonoscopy normal with localized 4 mm inflammatory stricture at the transition zone from ascending to cecum though normal cecal tissue through the stricture.  Unable to evaluate ICV or ileum. In 6/2019 stool calprotectin 179.  MRE 7/2019 significant for circumferential wall thickening with transmural enhancement of the TI and cecum extending to the mid right colon.  Due to stricture she was referred to pediatric surgeon, Dr. Monteiro but surgery was deferred.  In 1/2020 colonoscopy significant for perianal skin tags with pan-colonic inflammation (right colon worse than left colon), previous stricture in proximal ascending improved, ICV stricture with ulceration and friability though unable to pass colonoscopy through stricture with TI inflammation.  In 1/2020 EGD significant for spontaneous antral granularity with oozing and inflammation/erythema of the fundus with normal duodenum. She met Dr. Daniels 2/2020 at which time she had normal BMs with nausea and intermittent vomiting q 2 weeks.  Colonoscopy 3/2020 significant for  ulcerated Crohn's disease with moderate stricture in the ascending colon not traversed and tatoo placed with distal colon with mild erythema without any ulcers and normal sigmoid and rectum. MRE 3/2020 significant for wall thickening of the distal ileum at the ICV with narrowing  with second 2.6 cm ileum with narrowing located 2.4 cm proximal to this area. In 2020 patient underwent laparoscopic right hemicolectomy (resection of TI, ccume, right colon) with surgical path c/w mild chronic active ileitis and colitis with transmural inflammation, focal granuloma, perineural inflammation.  Postoperatively she continued on Humira 40 mg SC weekly and 6MP alternating 50 mg/75 mg/d.  In 2020 EGD normal and colonoscopy significant for mild and patchy apthous ulcers in sigmoid colon with remainder of the colon normal with side to side ileocolonic anastomosis in the transverse colon and anastomosis ulcerated with blind end with ulcer and neoterminal ileum with >5 apthous ulcers c/w Rutgeert's score i2.  Fecal calprotectin 263 and CRP elevated at 1.6.  Humira with 6MP discontinued 2020.  She started stelara 9/10/20. Due to low stelara levels her dose was optimized to 90 mg SC q 4 weeks by 2021.  In 2021 fecal calprotectin 50 and CRP 1.9. She delivered health term baby by  21 and per notes prior to delivery she had slight elevation in her fecal calprotectin.  In 10/2021 EGD significant for LA Grade A esophagitis and colonoscopy significant for large perianal skin tags with side to side ileocolonic anastomosis in the transverse colon that is patent and 2 small ulcers at the ileotransverse anastomosis and normal neoterminal ileum. Dr. Marcum at [facility] referred patient to  2022 at which time she continued on stelara 90 mg SC q 4 weeks though mention of slightly elevated stool calprotectin 181. On 10/7/22 patient received reinduction with stelara IV and then continued on stelara 90  mg SC q 4 weeks.  In 3/2023 stool calprotectin 242 and CRP 21.  In 3/2023 she had wisdom teeth removed and was on intermittent NSAIDs.  On 5/12/23 colonoscopy significant for perianal skin tags, normal ileum with ileocolonic anastomotic ulceration, and erythematous/hemorrhagic/inflammed nodular ulcerated mucosa in rectum and sigmoid colon (bx rectum/sigmoid/descending with mild active inflammation). EGD 5/2023 significant for mild gastric fundus and antrum with inflammation (bx HP neg moderate chronic gastritis, rare non-necrotizing granuloma) and normal duodenum. In 6/21/23 patient started on entyvio and subsequently established care with Dr. Grier at [facility] in 7/2023. Stool calprotectin elevated in 7/2023 (145), 10/2023 (719), 11/2023 (293). I met patient initially 1/2024 when she was in her 2nd trimester of pregnancy and she had continued on entyvio q 8 weeks and reported intermittent upper abdominal cramping, RUQ that woke her up in the middle of the night, 2-3 loose to watery BMS/d with mucous and streaks of blood on toilet paper with perianal skin tags, oral ulcers that had resolved but were active 12/2023.  In 1/2024 C diff neg/PCR pos and took 10 day course of oral vancomycin w./o change in symptoms. She had trouble swallowing and mouth sores with had some benefit with magic mouthwash and colgate peroxyl.  Due to symptoms of loose stool, occ notruanl BMS we proceeded with uceris foam (1/23/24-2/20/24) and we planned to check stool calprotectin after course of uceris foam. On 2/22/24 stool calprotectin 579 and pt started on uceris foam qhs followed by medrol dose guy approximately 2/27/24-3/3/24 and repeat stool calprotectin 3/7/24 690. On 3/11/24 she reported dysphagia due to recurrent oral ulcers and 1-2 loose to formed BMs/d with 1 nocturnal BM once a twice/wk and due to this uceris foam increased to BID for 2 weeks but then she self discontinued due to running out and sx stable.  On 4/11/24 she  called due to 2 weeks of worsening symptoms with 1-2 loose to formed BMs/d with 1-2 nocturnal BMs each week, blood dripping in the toilet, upper abdominal pain, occ nausea and ongoing oral and throat ulcers.  She took 8 week course of entocort 9 mg daily (24-24). She delivered healthy term baby by   on 24. We then proceeded with stool calprotectin, EGD/colonoscopy and trough VDZ levels to restage her disease after delivery.      Interval History:  - current IBD meds: Entyvio 300 mg IV q 4 weeks (started 2023, 300 mg IV q 4 weeks 24, LD , ND )   - recent IBD meds: entocort 9 mg daily (24-24)  - 1 formed BMs/d, no blood/urgency/nocturnal BMs, 1-2 times/week loose but still only once that day, no blood mixed ins tool some BRB dripping in toilet and on TP  - delivered health term baby 24 by  with no complications and has recovered well  - breastfeeding still   - 24 trough VDZ level 3.1 (entyvio q 8 weeks)  - stool calprotectin: 3/7/24 690,  24 151   - 24 EGD:  normal esophagus, normal stomach, normal duodenum  - 24 colonoscopy: Large perianal skin tags found on perianal exam. From anal verge to 10 cm there is mild to moderate inflammation characterized by erythema and few apthous ulcerations. From 10 cm to 25 cm the mucosa is normal. At 25 cm rectosigmoid colon) there is a localized area of moderate inflammation characterized by erythema with ulceration. There is one localized area of inflammation characterized by erythema and apthous ulcer in the sigmoid colon. From 25 cm to the ileo-ascending anastomosis the mucosa is normal. At the ileoascending anastomosis there is severe inflammation characterized by ulceration, friability. The christy-terminal ileum appeared normal. Biopsies neoterminal ileum normal, ileocolonic anastomosis severe active inflammation, transverse colon focal moderate active inflammation, descending colon with focal active  inflammation, sigmoid colon focal moderate active inflammation, rectosigmoid colon mild active inflammation, rectum mild active proctitis with intramucosal non-necrotizing granuloma  - NSAID use: No  - Narcotic use: No  - Alternative/complementary meds for IBD: No    Prior Pertinent Surgeries:   5/2020 laparoscopic right hemicolectomy (resection of TI, ccume, right colon) with surgical path c/w mild chronic active ileitis and colitis with transmural inflammation, focal granuloma, perineural inflammation    Last pertinent Endoscopy/Imaging:  3/12/20 MRE: wall thickening of the distal ileum at the level of the ileocecal valve with narrowing of the lumen, with the lumen measuring up to 3 mm. Approximately 2.4 cm proximal to this area is an additional area of circumferential wall thickening measuring approximately 2.6 cm in length with luminal narrowing measuring as narrow as 3 mm. Abnormal motion and lack of peristalsis is noted on the cine images within this area. Restricted diffusion is noted within the bowel wall within this area with minimal perienteric edema   7/30/24 EGD:  normal esophagus, normal stomach, normal duodenum  7/30/24 colonoscopy: Large perianal skin tags found on perianal exam. From anal verge to 10 cm there is mild to moderate inflammation characterized by erythema and few apthous ulcerations. From 10 cm to 25 cm the mucosa is normal. At 25 cm tosigmoid colon) there is a localized area of moderate inflammation characterized by erythema with ulceration. There is one localized area of inflammation characterized by erythema and apthous ulcer in the sigmoid colon. From 25 cm to the ileo-ascending anastomosis the mucosa is normal. At the ileoascending anastomosis there is severe inflammation characterized by ulceration, friability. The christy-terminal ileum appeared normal.     Therapeutic Drug Monitoring Labs:  12/2016 6TG 430  2/2017 humira levels 11.8/Abs neg  3/2017 her 6TG 248/6MMP 2309, humira levels  11.6  6/2018 6TG 174, 6MMP 1847   1/2019 6TG 287/6MMP 6350  8/2018 humira 17.8  5/9/24 trough VDZ 3.1    Prior IBD Therapies:  Entocort- 2016  6MP 5065-3008 (12/2016 25 mg/d, 8/2017 alternating 50 mg and 75 mg/d, stopped 9/2020)  Remicade 6138-2905- secondary nonresponder due to antibodies  Humira 40 mg SC weekly (3/2016-9/2020, weekly 9/2016)- secondary nonresponder  Stelara 90 mg SC q 4 weeks (started 9/10/20, 90 mg SC q 4 weeks Feb/March 2021)- effective and possibly had inflammation on scope due to NSAIDs  uceris foam BID (started 1/23/24-2/20/24, 3/11-approximately 3/25/24)- effective  Medrol dose guy 2/27/24-3/3/24- effective     Vaccinations:  Lab Results   Component Value Date    HEPBSURFABQU POSITIVE 01/09/2024    HEPBSURFABQU 68 01/09/2024     Lab Results   Component Value Date    HEPAIGG Non-reactive 01/09/2024     Lab Results   Component Value Date    VARICELLAZOS 77.38 01/09/2024    VARICELLAINT Negative 01/09/2024     Lab Results   Component Value Date    MUMPSIGGSCRE 63.00 01/09/2024    MUMPSIGGINTE Positive 01/09/2024      Lab Results   Component Value Date    RUBEOLAIGGAN >300.00 01/09/2024    RUBEOLAINTER Positive 01/09/2024     Immunization History   Administered Date(s) Administered    DTaP 2000, 01/05/2001, 03/08/2001, 10/04/2001, 05/04/2005    HIB 2000, 01/05/2001, 03/18/2001, 10/04/2001    Hepatitis B (recombinant) Adjuvanted, 2 dose 08/04/2020, 10/20/2020    Hepatitis B, Pediatric/Adolescent 2000, 2000, 03/08/2001    HiB PRP-T 03/08/2001    IPV 2000, 01/05/2001, 10/04/2001, 05/04/2005    MMR 10/04/2001, 05/04/2005    Meningococcal Conjugate (MCV4O) 1 Vial Dose(10yr-55yr) 01/10/2012    Meningococcal Conjugate (MCV4P) 08/09/2018    Pneumococcal Conjugate - 13 Valent 02/26/2020    Pneumococcal Conjugate - 7 Valent 03/08/2001    Pneumococcal Polysaccharide - 23 Valent 08/04/2020    Tdap 01/10/2012, 04/12/2022    Varicella 10/04/2001, 08/19/2008   Flu shot:  "recommended yearly   COVID vaccine/booster:  per CDC recommendations  RSV: after age 59 yo  Tetanus (Tdap):  4/2032  PCV 20: 8/2025  HPV: not sure, NA     Hepatitis A: recommended  Shingrix: recommended    Review of Systems   Constitutional:  Negative for chills, fever and weight loss.   HENT:          No oral ulcers, dysphagia, oral thrush   Eyes:  Negative for blurred vision, pain and redness.   Respiratory:  Negative for cough and shortness of breath.    Cardiovascular:  Negative for chest pain.   Gastrointestinal:  Negative for abdominal pain, heartburn, nausea and vomiting.   Genitourinary:  Negative for dysuria and hematuria.   Musculoskeletal:  Negative for back pain and joint pain.   Skin:  Negative for rash.   Psychiatric/Behavioral:  Negative for depression. The patient is not nervous/anxious and does not have insomnia.      All Medical History/Surgical History/Family History/Social History/Allergies have been reviewed and updated in EMR    Outpatient Medications Marked as Taking for the 8/13/24 encounter (Office Visit) with Antonio Montgomery MD   Medication Sig Dispense Refill    buPROPion (WELLBUTRIN XL) 300 MG 24 hr tablet Take 300 mg by mouth once daily.      ergocalciferol (ERGOCALCIFEROL) 50,000 unit Cap Take 1 capsule (50,000 Units total) by mouth every 7 days. 4 capsule 5    prenatal 21/iron fu/folic acid (PRENATAL COMPLETE ORAL) Take 1 tablet by mouth once daily.      vedolizumab (ENTYVIO) 300 mg SolR injection Inject 300 mg into the vein every 28 days.       Vital Signs:  /82 (BP Location: Left arm, Patient Position: Sitting, BP Method: Small (Automatic))   Pulse 108   Temp 98.1 °F (36.7 °C) (Temporal)   Ht 5' 7" (1.702 m)   Wt 82.6 kg (182 lb 1.6 oz)   SpO2 99%   BMI 28.52 kg/m²    Physical Exam  Constitutional:       General: She is not in acute distress.  Cardiovascular:      Rate and Rhythm: Normal rate and regular rhythm.      Pulses: Normal pulses.      Heart sounds: Normal " heart sounds. No murmur heard.  Pulmonary:      Effort: Pulmonary effort is normal.      Breath sounds: Normal breath sounds.   Abdominal:      General: Bowel sounds are normal. There is no distension.      Palpations: Abdomen is soft.      Tenderness: There is no abdominal tenderness.     Labs:   Lab Results   Component Value Date    CRP 34.5 (H) 01/09/2024    CALPROTECTIN 151.0 (H) 07/01/2024     Lab Results   Component Value Date    HEPBSAG Non-reactive 01/09/2024    HEPBCAB Non-reactive 01/09/2024     Lab Results   Component Value Date    TBGOLDPLUS Negative 01/09/2024     Lab Results   Component Value Date    WUDWVCIA74BJ 41 03/23/2023    GPNUOOIR08 1162 (H) 07/30/2024     Lab Results   Component Value Date    WBC 8.72 07/30/2024    HGB 11.7 (L) 07/30/2024    HCT 38.7 07/30/2024    MCV 84 07/30/2024     (H) 07/30/2024     Lab Results   Component Value Date    CREATININE 0.9 07/30/2024    ALBUMIN 3.8 07/30/2024    BILITOT 0.2 07/30/2024    ALKPHOS 115 07/30/2024    AST 27 07/30/2024    ALT 52 (H) 07/30/2024     Assessment/Plan:  Harper CAMPA is a 23 y.o. female with Crohn's disease with ileo-transverse anastomosis (gastroduodenal, ileum, pancolonic S/P ileocolonic resection due to stricture) and history of EIMS including oral ulcers, erythema nodosum and episcleritis, history of C diff (1/2024- oral vancomycin).    Patient is postpartum and feeling well and continues on entyvio.  Since last visit patient had successful delivery of her baby girl by C section and symptoms overall improved. Postpartum we restaged her disease with EGD and colonoscopy. EGD 7/30/24 was normal and colonoscopy 7/2024 significant for large perianal skin tags and mild to moderate inflammation of the distal rectum with patchy areas of inflammation localized to the rectosigmoid colon, sigmoid colon with severe inflammation at the ileoascending anastomosis. Based on this along with low entyvio levels since 7/1/24 she has been on  entyvio q 4 weeks with a plan to restage her disease after adequate time on this with stool calprotectin, repeat trough VDZ levels and colonoscopy early 2025.  She will proceed with already scheduled MRE today    # Crohn's disease with ileo-transverse anastomosis (gastroduodenal, ileum, pancolonic S/P ileocolonic resection due to stricture):  - mild anemia- stable   - continue entyvio 300 mg IV q 4 weeks  - colonoscopy early 2025  - MRE today  - stool calprotectin 10/2024, 1/2025  - drug monitoring labs: CBC/CMP q 6 mos (9/2024), TPMT (normal 1/2024), TB quantiferon (1/2025), Hep B testing (1/2025)  - TDM: 9/4/24 trough VDZ    # Elevated ALT:  - repeat LFTs and serological workup with labs 9/2024    # Immunodeficiency due to long term immunosuppressive drug therapy and IBD specific health maintenance:  CRC risk- sx 4/2013, pancolonic, surveillance colonoscopy q 1-2 years   Skin exam- yearly- normal 3/2024, outside dermatologist   Risk for osteopenia/osteoporosis- none  Pap smear- yearly- normal 10/2023, defer to OB/GYN  Vitamin D- normal 3/2023, continue high dose vit D weekly-has been on this for years  Vaccines- no live vaccines, hep A and shingrix- will defer for now and pt to okay with pediatrician and will let us know     Total time: 40 min    Visit today is associated with current or anticipated ongoing medical care related to this patient's single serious condition/complex condition crohn's disease in setting of pregnancy.     Follow up in about 5 months (around 1/13/2025).    Antonio Montgomery MD  Department of Gastroenterology  Medical Director, Inflammatory Bowel Disease

## 2024-08-13 NOTE — PATIENT INSTRUCTIONS
- continue entyvio every 4 weeks  - turn in stool calprotectin in 10/2/24 and again a week prior to your visit with me in Jan 2024  - likely plan to do colonoscopy early 2025 (will schedule this with your next visit) though will do sooner if your symptoms change or your stool calprotectin not getting better or getting worse   - repeat liver test today and depending on results may repeat and do workup with your labs on 9/4/24  - MRE today as scheduled   - ask pediatrician if you can get hepatitis A (havrix) and shingrix vaccine while breastfeeding  - 9/4/24- trough entyvio level prior to your infusion go to [facility]

## 2024-09-03 ENCOUNTER — PATIENT MESSAGE (OUTPATIENT)
Dept: GASTROENTEROLOGY | Facility: CLINIC | Age: 24
End: 2024-09-03
Payer: COMMERCIAL

## 2024-09-03 DIAGNOSIS — R19.7 DIARRHEA, UNSPECIFIED TYPE: Primary | ICD-10-CM

## 2024-09-04 ENCOUNTER — LAB VISIT (OUTPATIENT)
Dept: LAB | Facility: HOSPITAL | Age: 24
End: 2024-09-04
Attending: INTERNAL MEDICINE
Payer: COMMERCIAL

## 2024-09-04 DIAGNOSIS — R19.7 DIARRHEA, UNSPECIFIED TYPE: ICD-10-CM

## 2024-09-04 DIAGNOSIS — R74.01 ELEVATED ALT MEASUREMENT: ICD-10-CM

## 2024-09-04 DIAGNOSIS — K50.818 CROHN'S DISEASE OF BOTH SMALL AND LARGE INTESTINE WITH OTHER COMPLICATION: ICD-10-CM

## 2024-09-04 LAB
ALBUMIN SERPL BCP-MCNC: 3.6 G/DL (ref 3.5–5.2)
ALP SERPL-CCNC: 121 U/L (ref 55–135)
ALT SERPL W/O P-5'-P-CCNC: 33 U/L (ref 10–44)
AST SERPL-CCNC: 26 U/L (ref 10–40)
BILIRUB DIRECT SERPL-MCNC: 0.1 MG/DL (ref 0.1–0.3)
BILIRUB SERPL-MCNC: 0.2 MG/DL (ref 0.1–1)
HAV IGM SERPL QL IA: NORMAL
HBV CORE AB SERPL QL IA: NORMAL
HBV SURFACE AG SERPL QL IA: NORMAL
HCV AB SERPL QL IA: NORMAL
IGA SERPL-MCNC: 445 MG/DL (ref 40–350)
IGG SERPL-MCNC: 1389 MG/DL (ref 650–1600)
IGM SERPL-MCNC: 123 MG/DL (ref 50–300)
PROT SERPL-MCNC: 7.8 G/DL (ref 6–8.4)

## 2024-09-04 PROCEDURE — 87046 STOOL CULTR AEROBIC BACT EA: CPT | Performed by: INTERNAL MEDICINE

## 2024-09-04 PROCEDURE — 86015 ACTIN ANTIBODY EACH: CPT | Performed by: INTERNAL MEDICINE

## 2024-09-04 PROCEDURE — 86381 MITOCHONDRIAL ANTIBODY EACH: CPT | Performed by: INTERNAL MEDICINE

## 2024-09-04 PROCEDURE — 86704 HEP B CORE ANTIBODY TOTAL: CPT | Performed by: INTERNAL MEDICINE

## 2024-09-04 PROCEDURE — 87449 NOS EACH ORGANISM AG IA: CPT | Mod: 91 | Performed by: INTERNAL MEDICINE

## 2024-09-04 PROCEDURE — 87340 HEPATITIS B SURFACE AG IA: CPT | Performed by: INTERNAL MEDICINE

## 2024-09-04 PROCEDURE — 82784 ASSAY IGA/IGD/IGG/IGM EACH: CPT | Mod: 59 | Performed by: INTERNAL MEDICINE

## 2024-09-04 PROCEDURE — 87045 FECES CULTURE AEROBIC BACT: CPT | Performed by: INTERNAL MEDICINE

## 2024-09-04 PROCEDURE — 80280 DRUG ASSAY VEDOLIZUMAB: CPT | Performed by: INTERNAL MEDICINE

## 2024-09-04 PROCEDURE — 87427 SHIGA-LIKE TOXIN AG IA: CPT | Mod: 59 | Performed by: INTERNAL MEDICINE

## 2024-09-04 PROCEDURE — 86803 HEPATITIS C AB TEST: CPT | Performed by: INTERNAL MEDICINE

## 2024-09-04 PROCEDURE — 87449 NOS EACH ORGANISM AG IA: CPT | Performed by: INTERNAL MEDICINE

## 2024-09-04 PROCEDURE — 83993 ASSAY FOR CALPROTECTIN FECAL: CPT | Performed by: INTERNAL MEDICINE

## 2024-09-04 PROCEDURE — 80076 HEPATIC FUNCTION PANEL: CPT | Performed by: INTERNAL MEDICINE

## 2024-09-04 PROCEDURE — 87324 CLOSTRIDIUM AG IA: CPT | Performed by: INTERNAL MEDICINE

## 2024-09-04 PROCEDURE — 36415 COLL VENOUS BLD VENIPUNCTURE: CPT | Mod: PO | Performed by: INTERNAL MEDICINE

## 2024-09-04 PROCEDURE — 82397 CHEMILUMINESCENT ASSAY: CPT | Performed by: INTERNAL MEDICINE

## 2024-09-04 PROCEDURE — 86709 HEPATITIS A IGM ANTIBODY: CPT | Performed by: INTERNAL MEDICINE

## 2024-09-05 ENCOUNTER — TELEPHONE (OUTPATIENT)
Dept: GASTROENTEROLOGY | Facility: CLINIC | Age: 24
End: 2024-09-05
Payer: COMMERCIAL

## 2024-09-05 LAB
C DIFF GDH STL QL: NEGATIVE
C DIFF TOX A+B STL QL IA: NEGATIVE
MITOCHONDRIA AB TITR SER IF: NORMAL {TITER}
SMOOTH MUSCLE AB TITR SER IF: NORMAL {TITER}

## 2024-09-05 NOTE — TELEPHONE ENCOUNTER
Received request for updated nursing orders for Entyvio administration from [facility]    - Current therapy: Entyvio 300 mg IV q 4 weeks (started 6/2023, 300 mg IV q 4 weeks 7/1/24)  - Last office visit:  8/13/24  - Labs: CBC/CMP 7/2024, repeat 1/2025, TB  neg 1/2024, repeat 1/2025; HepB neg 9/2024, repeat 9/2025  - Next office visit:  1/30/24  - Allergies reviewed.     Entyvio 300 mg IV every 4 week nursing orders prepared for MD signature.

## 2024-09-06 LAB
E COLI SXT1 STL QL IA: NEGATIVE
E COLI SXT2 STL QL IA: NEGATIVE

## 2024-09-07 LAB — BACTERIA STL CULT: NORMAL

## 2024-09-09 ENCOUNTER — TELEPHONE (OUTPATIENT)
Dept: GASTROENTEROLOGY | Facility: CLINIC | Age: 24
End: 2024-09-09
Payer: COMMERCIAL

## 2024-09-09 LAB — CALPROTECTIN STL-MCNT: ABNORMAL MCG/G

## 2024-09-09 NOTE — TELEPHONE ENCOUNTER
----- Message from Antonio Montgomery MD sent at 9/9/2024  2:19 PM CDT -----  Regarding: high stool calprotectin  Please let her know stool calprotectin very high  Get symptom update so we can decide next steps- may need to consider prednisone  Also will need a f/u appt in next 1-2 weeks    SS

## 2024-09-10 ENCOUNTER — LAB VISIT (OUTPATIENT)
Dept: LAB | Facility: HOSPITAL | Age: 24
End: 2024-09-10
Attending: FAMILY MEDICINE
Payer: COMMERCIAL

## 2024-09-10 DIAGNOSIS — K50.818 CROHN'S DISEASE OF BOTH SMALL AND LARGE INTESTINE WITH OTHER COMPLICATION: ICD-10-CM

## 2024-09-10 PROCEDURE — 83993 ASSAY FOR CALPROTECTIN FECAL: CPT | Performed by: INTERNAL MEDICINE

## 2024-09-10 NOTE — TELEPHONE ENCOUNTER
Signed [facility] administration orders faxed to [facility] (fax: [phone])  Scanned document into media tab.

## 2024-09-16 ENCOUNTER — PATIENT MESSAGE (OUTPATIENT)
Dept: GASTROENTEROLOGY | Facility: CLINIC | Age: 24
End: 2024-09-16
Payer: COMMERCIAL

## 2024-09-16 LAB — CALPROTECTIN STL-MCNT: ABNORMAL MCG/G

## 2024-09-18 ENCOUNTER — TELEPHONE (OUTPATIENT)
Dept: GASTROENTEROLOGY | Facility: CLINIC | Age: 24
End: 2024-09-18
Payer: COMMERCIAL

## 2024-09-18 NOTE — TELEPHONE ENCOUNTER
Moved pt 1/2025 appt to 10/25 at 9:30 am.  - only available slot left although its about 5 weeks out.       ----- Message from Litzy Duncan RN sent at 9/18/2024  8:57 AM CDT -----  See msg below  ----- Message -----  From: Antonio Montgomery MD  Sent: 9/16/2024   4:42 PM CDT  To: [facility] Staff    Please move up her appt from 1/2025 to next 3-4 weeks please    SS

## 2024-10-07 DIAGNOSIS — K50.818 CROHN'S DISEASE OF BOTH SMALL AND LARGE INTESTINE WITH OTHER COMPLICATION: ICD-10-CM

## 2024-10-08 RX ORDER — ERGOCALCIFEROL 1.25 MG/1
50000 CAPSULE ORAL
Qty: 4 CAPSULE | Refills: 5 | Status: SHIPPED | OUTPATIENT
Start: 2024-10-08

## 2024-10-08 NOTE — TELEPHONE ENCOUNTER
Allergies reviewed  Labs UTD  Dr Montgomery note 8/13/24: Vitamin D- normal 3/2023, continue high dose vit D weekly-has been on this for years  Rx pended for approval

## 2024-10-14 ENCOUNTER — PATIENT MESSAGE (OUTPATIENT)
Dept: GASTROENTEROLOGY | Facility: CLINIC | Age: 24
End: 2024-10-14
Payer: COMMERCIAL

## 2024-10-25 ENCOUNTER — OFFICE VISIT (OUTPATIENT)
Dept: GASTROENTEROLOGY | Facility: CLINIC | Age: 24
End: 2024-10-25
Payer: COMMERCIAL

## 2024-10-25 ENCOUNTER — LAB VISIT (OUTPATIENT)
Dept: LAB | Facility: HOSPITAL | Age: 24
End: 2024-10-25
Attending: INTERNAL MEDICINE
Payer: COMMERCIAL

## 2024-10-25 VITALS
HEART RATE: 99 BPM | HEIGHT: 67 IN | BODY MASS INDEX: 27.06 KG/M2 | SYSTOLIC BLOOD PRESSURE: 119 MMHG | DIASTOLIC BLOOD PRESSURE: 83 MMHG | WEIGHT: 172.38 LBS | TEMPERATURE: 98 F | OXYGEN SATURATION: 99 %

## 2024-10-25 DIAGNOSIS — R23.3 EASY BRUISING: ICD-10-CM

## 2024-10-25 DIAGNOSIS — K12.1 ORAL ULCER: ICD-10-CM

## 2024-10-25 DIAGNOSIS — K50.818 CROHN'S DISEASE OF BOTH SMALL AND LARGE INTESTINE WITH OTHER COMPLICATION: ICD-10-CM

## 2024-10-25 DIAGNOSIS — D84.821 IMMUNODEFICIENCY DUE TO LONG TERM IMMUNOSUPPRESSIVE DRUG THERAPY: ICD-10-CM

## 2024-10-25 DIAGNOSIS — T45.1X5A IMMUNODEFICIENCY DUE TO LONG TERM IMMUNOSUPPRESSIVE DRUG THERAPY: ICD-10-CM

## 2024-10-25 DIAGNOSIS — Z79.899 IMMUNODEFICIENCY DUE TO LONG TERM IMMUNOSUPPRESSIVE DRUG THERAPY: ICD-10-CM

## 2024-10-25 DIAGNOSIS — D64.9 ANEMIA, UNSPECIFIED TYPE: ICD-10-CM

## 2024-10-25 DIAGNOSIS — K50.818 CROHN'S DISEASE OF BOTH SMALL AND LARGE INTESTINE WITH OTHER COMPLICATION: Primary | ICD-10-CM

## 2024-10-25 LAB
ALBUMIN SERPL BCP-MCNC: 3.3 G/DL (ref 3.5–5.2)
ALP SERPL-CCNC: 93 U/L (ref 40–150)
ALT SERPL W/O P-5'-P-CCNC: 17 U/L (ref 10–44)
ANION GAP SERPL CALC-SCNC: 8 MMOL/L (ref 8–16)
AST SERPL-CCNC: 18 U/L (ref 10–40)
BASOPHILS # BLD AUTO: 0.04 K/UL (ref 0–0.2)
BASOPHILS NFR BLD: 0.6 % (ref 0–1.9)
BILIRUB SERPL-MCNC: 0.2 MG/DL (ref 0.1–1)
BUN SERPL-MCNC: 5 MG/DL (ref 6–20)
CALCIUM SERPL-MCNC: 9.4 MG/DL (ref 8.7–10.5)
CHLORIDE SERPL-SCNC: 107 MMOL/L (ref 95–110)
CO2 SERPL-SCNC: 29 MMOL/L (ref 23–29)
CREAT SERPL-MCNC: 0.7 MG/DL (ref 0.5–1.4)
CRP SERPL-MCNC: 20.7 MG/L (ref 0–8.2)
DIFFERENTIAL METHOD BLD: ABNORMAL
EOSINOPHIL # BLD AUTO: 0.2 K/UL (ref 0–0.5)
EOSINOPHIL NFR BLD: 2.2 % (ref 0–8)
ERYTHROCYTE [DISTWIDTH] IN BLOOD BY AUTOMATED COUNT: 15.2 % (ref 11.5–14.5)
EST. GFR  (NO RACE VARIABLE): >60 ML/MIN/1.73 M^2
FERRITIN SERPL-MCNC: 21 NG/ML (ref 20–300)
GLUCOSE SERPL-MCNC: 98 MG/DL (ref 70–110)
HCT VFR BLD AUTO: 36.5 % (ref 37–48.5)
HGB BLD-MCNC: 11.1 G/DL (ref 12–16)
IMM GRANULOCYTES # BLD AUTO: 0.03 K/UL (ref 0–0.04)
IMM GRANULOCYTES NFR BLD AUTO: 0.4 % (ref 0–0.5)
INR PPP: 1 (ref 0.8–1.2)
IRON SERPL-MCNC: 19 UG/DL (ref 30–160)
LYMPHOCYTES # BLD AUTO: 1.7 K/UL (ref 1–4.8)
LYMPHOCYTES NFR BLD: 24.1 % (ref 18–48)
MCH RBC QN AUTO: 24.6 PG (ref 27–31)
MCHC RBC AUTO-ENTMCNC: 30.4 G/DL (ref 32–36)
MCV RBC AUTO: 81 FL (ref 82–98)
MONOCYTES # BLD AUTO: 0.6 K/UL (ref 0.3–1)
MONOCYTES NFR BLD: 8.6 % (ref 4–15)
NEUTROPHILS # BLD AUTO: 4.4 K/UL (ref 1.8–7.7)
NEUTROPHILS NFR BLD: 64.1 % (ref 38–73)
NRBC BLD-RTO: 0 /100 WBC
PLATELET # BLD AUTO: 363 K/UL (ref 150–450)
PMV BLD AUTO: 9.4 FL (ref 9.2–12.9)
POTASSIUM SERPL-SCNC: 3.6 MMOL/L (ref 3.5–5.1)
PROT SERPL-MCNC: 7.5 G/DL (ref 6–8.4)
PROTHROMBIN TIME: 10.8 SEC (ref 9–12.5)
RBC # BLD AUTO: 4.51 M/UL (ref 4–5.4)
SATURATED IRON: 6 % (ref 20–50)
SODIUM SERPL-SCNC: 144 MMOL/L (ref 136–145)
TOTAL IRON BINDING CAPACITY: 336 UG/DL (ref 250–450)
TRANSFERRIN SERPL-MCNC: 227 MG/DL (ref 200–375)
VIT B12 SERPL-MCNC: 731 PG/ML (ref 210–950)
WBC # BLD AUTO: 6.89 K/UL (ref 3.9–12.7)

## 2024-10-25 PROCEDURE — 86140 C-REACTIVE PROTEIN: CPT | Performed by: INTERNAL MEDICINE

## 2024-10-25 PROCEDURE — 82607 VITAMIN B-12: CPT | Performed by: INTERNAL MEDICINE

## 2024-10-25 PROCEDURE — 83540 ASSAY OF IRON: CPT | Performed by: INTERNAL MEDICINE

## 2024-10-25 PROCEDURE — 82728 ASSAY OF FERRITIN: CPT | Performed by: INTERNAL MEDICINE

## 2024-10-25 PROCEDURE — 85610 PROTHROMBIN TIME: CPT | Performed by: INTERNAL MEDICINE

## 2024-10-25 PROCEDURE — 85025 COMPLETE CBC W/AUTO DIFF WBC: CPT | Performed by: INTERNAL MEDICINE

## 2024-10-25 PROCEDURE — 80053 COMPREHEN METABOLIC PANEL: CPT | Performed by: INTERNAL MEDICINE

## 2024-10-25 PROCEDURE — 36415 COLL VENOUS BLD VENIPUNCTURE: CPT | Performed by: INTERNAL MEDICINE

## 2024-10-25 NOTE — PATIENT INSTRUCTIONS
- lysine for oral ulcers   - continue entyvio   - hep A # 1 today  - labs today  - continue entyvio  - turn in another stool calprotectin

## 2024-10-25 NOTE — PROGRESS NOTES
[facility]             Inflammatory Bowel Disease   Follow-up  Note              TODAY'S VISIT DATE:  10/25/2024    Chief Complaint:   Chief Complaint   Patient presents with    Crohn's Disease     PCP: No primary care provider on file.    Previous History:  Harper CAMPA is a 24 y.o. female  Crohn's disease with ileo-transverse anastomosis (gastroduodenal, ileum, pancolonic S/P ileocolonic resection due to stricture) and history of EIMS including oral ulcers, episcleritis (6/2016), erythema nodosum (8466-6178), PCOS, osteopenia, history of C diff (1/2024- oral vancomycin). She was doing well until 4/2013 when she had diarrhea, N/V, wt loss, oral ulcers and underwent EGD and colonoscopy with finding c/w Crohn's disease (gastroduodenal, pan-colonic, ileum).  She was initially placed on corticosteroids and remicade from 3556-9135 and was a secondary nonresponder due to developing high abs despite adding 6MP in 2015 (noral TPM 30). In approximately 2014 or 2015 she had erythema nodosum LE and recalls prednisone helped symptoms and she had no recurrence. Even on remicade she feels like there was not a period of deep remission though likely a partial response. She was hospitalized in 2/2016 and underwent EGD significant for endoscopically linear erythema and white plaques in the entire esophagus with bx c/w mild acute esophagitis throughout the esophagus, HP neg moderate chronic active gastritis.  Colonoscopy significant for multiple perianal skin tags and active inflammation throughout the entire colon, ICV and ileum. She was discharged home with steroids (tapered off within 2 mos) and enteral nutrition through NGT. In approximately 2015 she was not compliant with 6MP because difficult to swallow pills due to painful LAD in neck.  She was then started on Humira and continued 6MP 50 mg/d in 3/2016. In 6/2016 she was diagnosed with episcleritis treated with steroid eye drops. In 9/2016 she was  optimized to humira 40 mg SC weekly..  In 9/2016 pt started on budesonide 9 mg/d.  By 12/2016 her 6MP was decreased to 25 mg/d due to elevated 6TG 430. In 4/2017 EGD significanat for some focal gastric metaplasia on duodenal biopsies with moderate inflammation rectum, sigmoid, transverse, right colon and mild inflammation left colon. In 1/2017 calprotectin 891 and 2/2017 calprotectin 390. In 2/2017 MRE with diffuse inflammation in cecum and TI.   In 2/2017 humira levels 11.8/Abs neg.  In 3/2017 her 6TG 248/6MMP 2309, humira levels 11.6. In 8/2017 6mP changed from 50 mg/d to alternating 75 mg and 50 mg/d.  In 11/2017 calprotectin >1250 and in 5/2018 1030. In 6/2018 6TG 174, 6MMP 1847 and in 1/2019 6TG 287/6MMP 6350.  8/2018 humira 17.8. Overall she felt humira worked better than remicade and overall seemed to be close to remission.  In 6/2019 EGD normal except for pancreatic rest in the prepyloric area and colonoscopy normal with localized 4 mm inflammatory stricture at the transition zone from ascending to cecum though normal cecal tissue through the stricture.  Unable to evaluate ICV or ileum. In 6/2019 stool calprotectin 179.  MRE 7/2019 significant for circumferential wall thickening with transmural enhancement of the TI and cecum extending to the mid right colon.  Due to stricture she was referred to pediatric surgeon, Dr. Monteiro but surgery was deferred.  In 1/2020 colonoscopy significant for perianal skin tags with pan-colonic inflammation (right colon worse than left colon), previous stricture in proximal ascending improved, ICV stricture with ulceration and friability though unable to pass colonoscopy through stricture with TI inflammation.  In 1/2020 EGD significant for spontaneous antral granularity with oozing and inflammation/erythema of the fundus with normal duodenum. She met Dr. Daniels 2/2020 at which time she had normal BMs with nausea and intermittent vomiting q 2 weeks.  Colonoscopy 3/2020  significant for ulcerated Crohn's disease with moderate stricture in the ascending colon not traversed and tatoo placed with distal colon with mild erythema without any ulcers and normal sigmoid and rectum. MRE 3/2020 significant for wall thickening of the distal ileum at the ICV with narrowing  with second 2.6 cm ileum with narrowing located 2.4 cm proximal to this area. In 2020 patient underwent laparoscopic right hemicolectomy (resection of TI, ccume, right colon) with surgical path c/w mild chronic active ileitis and colitis with transmural inflammation, focal granuloma, perineural inflammation.  Postoperatively she continued on Humira 40 mg SC weekly and 6MP alternating 50 mg/75 mg/d.  In 2020 EGD normal and colonoscopy significant for mild and patchy apthous ulcers in sigmoid colon with remainder of the colon normal with side to side ileocolonic anastomosis in the transverse colon and anastomosis ulcerated with blind end with ulcer and neoterminal ileum with >5 apthous ulcers c/w Rutgeert's score i2.  Fecal calprotectin 263 and CRP elevated at 1.6.  Humira with 6MP discontinued 2020.  She started stelara 9/10/20. Due to low stelara levels her dose was optimized to 90 mg SC q 4 weeks by 2021.  In 2021 fecal calprotectin 50 and CRP 1.9. She delivered health term baby by  21 and per notes prior to delivery she had slight elevation in her fecal calprotectin.  In 10/2021 EGD significant for LA Grade A esophagitis and colonoscopy significant for large perianal skin tags with side to side ileocolonic anastomosis in the transverse colon that is patent and 2 small ulcers at the ileotransverse anastomosis and normal neoterminal ileum. Dr. Marcum at [facility] referred patient to  2022 at which time she continued on stelara 90 mg SC q 4 weeks though mention of slightly elevated stool calprotectin 181. On 10/7/22 patient received reinduction with stelara IV and then continued  on stelara 90 mg SC q 4 weeks.  In 3/2023 stool calprotectin 242 and CRP 21.  In 3/2023 she had wisdom teeth removed and was on intermittent NSAIDs.  On 5/12/23 colonoscopy significant for perianal skin tags, normal ileum with ileocolonic anastomotic ulceration, and erythematous/hemorrhagic/inflammed nodular ulcerated mucosa in rectum and sigmoid colon (bx rectum/sigmoid/descending with mild active inflammation). EGD 5/2023 significant for mild gastric fundus and antrum with inflammation (bx HP neg moderate chronic gastritis, rare non-necrotizing granuloma) and normal duodenum. In 6/21/23 patient started on entyvio and subsequently established care with Dr. Grier at [facility] in 7/2023. Stool calprotectin elevated in 7/2023 (145), 10/2023 (719), 11/2023 (293). I met patient initially 1/2024 when she was in her 2nd trimester of pregnancy and she had continued on entyvio q 8 weeks and reported intermittent upper abdominal cramping, RUQ that woke her up in the middle of the night, 2-3 loose to watery BMS/d with mucous and streaks of blood on toilet paper with perianal skin tags, oral ulcers that had resolved but were active 12/2023.  In 1/2024 C diff neg/PCR pos and took 10 day course of oral vancomycin w./o change in symptoms. She had trouble swallowing and mouth sores with had some benefit with magic mouthwash and colgate peroxyl.  Due to symptoms of loose stool, occ notruanl BMS we proceeded with uceris foam (1/23/24-2/20/24) and we planned to check stool calprotectin after course of uceris foam. On 2/22/24 stool calprotectin 579 and pt started on uceris foam qhs followed by medrol dose guy approximately 2/27/24-3/3/24 and repeat stool calprotectin 3/7/24 690. On 3/11/24 she reported dysphagia due to recurrent oral ulcers and 1-2 loose to formed BMs/d with 1 nocturnal BM once a twice/wk and due to this uceris foam increased to BID for 2 weeks but then she self discontinued due to running out and sx stable.  On  24 she called due to 2 weeks of worsening symptoms with 1-2 loose to formed BMs/d with 1-2 nocturnal BMs each week, blood dripping in the toilet, upper abdominal pain, occ nausea and ongoing oral and throat ulcers.  She took 8 week course of entocort 9 mg daily (24-24). On 24 trough VDZ 3.1 on entyvio q 8 weeks. She delivered healthy term baby by   on 24. We then proceeded with stool calprotectin, EGD/colonoscopy and trough VDZ levels to restage her disease after delivery.  Stool calprotectin 2024 151.  On 24 EGD normal and colonoscopy significant for large perianal skin tags with mild to moderate distal rectum inflammation with a localized area of moderate inflammation at 25 cm, one localized area of inflammation characterized by erythema and apthous ulcer in the sigmoid colon. From 25 cm to the ileo-ascending anastomosis normal colon with severe inflammation at the ileoascending anastomosis and normal neoterminal ileum.    Interval History:  - current IBD meds: Entyvio 300 mg IV q 4 weeks (started 2023, 300 mg IV q 4 weeks 24, LD 10/30, ND )   - recent IBD meds: entocort 9 mg daily (24-24)  - 1-2 formed BMs/d, urgentcy- 1-2 times/week, blood once in 2 weeks- on TP and separate than the stool , no nocturnal BMs  - oral ulcers intermittent and fluctuating worsening and then improves- started taking egg protein powder  - breastfeeding  - 24 ALT elevated, 24- serological liver workup and repeat LFTs negative   - 2024 MRE:  Inflammatory CD without luminal narrowing involving 3 areas, including the terminal ileum, proximal colon, and distal rectum with associated enhancement, mild wall thickening, and diffusion restriction.  This is favored to represent subacute inflammation versus less likely acute inflammation. Abundant cystic lesions in the bilateral ovaries, which be seen in the setting of polycystic ovarian disease.  Hepatomegaly.   - 9/3/24 pt  called and were having 3-5 loose to watery BMs/d with 1-2 with blood mixed in stool  - 9/4/24 stool studies- Cx/C diff negative, calprotectin 3100  - 9/4/24- trough VDZ 18   - stool calprotectin: 7/1/24 151, 9/4/24 3100, 9/10/24 1180, 10/7/24 282  - NSAID use: No  - Narcotic use: No  - Alternative/complementary meds for IBD: No    Prior Pertinent Surgeries:   5/2020 laparoscopic right hemicolectomy (resection of TI, ccume, right colon) with surgical path c/w mild chronic active ileitis and colitis with transmural inflammation, focal granuloma, perineural inflammation    Last pertinent Endoscopy/Imaging:  3/12/20 MRE: wall thickening of the distal ileum at the level of the ileocecal valve with narrowing of the lumen, with the lumen measuring up to 3 mm. Approximately 2.4 cm proximal to this area is an additional area of circumferential wall thickening measuring approximately 2.6 cm in length with luminal narrowing measuring as narrow as 3 mm. Abnormal motion and lack of peristalsis is noted on the cine images within this area. Restricted diffusion is noted within the bowel wall within this area with minimal perienteric edema   7/30/24 EGD:  normal esophagus, normal stomach, normal duodenum  7/30/24 colonoscopy: Large perianal skin tags found on perianal exam. From anal verge to 10 cm there is mild to moderate inflammation characterized by erythema and few apthous ulcerations. From 10 cm to 25 cm the mucosa is normal. At 25 cm tosigmoid colon) there is a localized area of moderate inflammation characterized by erythema with ulceration. There is one localized area of inflammation characterized by erythema and apthous ulcer in the sigmoid colon. From 25 cm to the ileo-ascending anastomosis the mucosa is normal. At the ileoascending anastomosis there is severe inflammation characterized by ulceration, friability. The christy-terminal ileum appeared normal.   8/2024 MRE:  Inflammatory CD without luminal narrowing involving 3  areas, including the terminal ileum, proximal colon, and distal rectum with associated enhancement, mild wall thickening, and diffusion restriction.  This is favored to represent subacute inflammation versus less likely acute inflammation. Abundant cystic lesions in the bilateral ovaries, which be seen in the setting of polycystic ovarian disease.  Hepatomegaly.     Therapeutic Drug Monitoring Labs:  12/2016 6TG 430  2/2017 humira levels 11.8/Abs neg  3/2017 her 6TG 248/6MMP 2309, humira levels 11.6  6/2018 6TG 174, 6MMP 1847   1/2019 6TG 287/6MMP 6350  8/2018 humira 17.8  5/9/24 trough VDZ 3.1    Prior IBD Therapies:  Entocort- 2016  6MP 3665-4158 (12/2016 25 mg/d, 8/2017 alternating 50 mg and 75 mg/d, stopped 9/2020)  Remicade 9549-5271- secondary nonresponder due to antibodies  Humira 40 mg SC weekly (3/2016-9/2020, weekly 9/2016)- secondary nonresponder  Stelara 90 mg SC q 4 weeks (started 9/10/20, 90 mg SC q 4 weeks Feb/March 2021)- effective and possibly had inflammation on scope due to NSAIDs  uceris foam BID (started 1/23/24-2/20/24, 3/11-approximately 3/25/24)- effective  Medrol dose guy 2/27/24-3/3/24- effective     Vaccinations:  Lab Results   Component Value Date    HEPBSURFABQU POSITIVE 01/09/2024    HEPBSURFABQU 68 01/09/2024     Lab Results   Component Value Date    HEPAIGG Non-reactive 01/09/2024     Lab Results   Component Value Date    VARICELLAZOS 77.38 01/09/2024    VARICELLAINT Negative 01/09/2024     Lab Results   Component Value Date    MUMPSIGGSCRE 63.00 01/09/2024    MUMPSIGGINTE Positive 01/09/2024      Lab Results   Component Value Date    RUBEOLAIGGAN >300.00 01/09/2024    RUBEOLAINTER Positive 01/09/2024     Immunization History   Administered Date(s) Administered    DTaP 2000, 01/05/2001, 03/08/2001, 10/04/2001, 05/04/2005    HIB 2000, 01/05/2001, 03/18/2001, 10/04/2001    Hepatitis B (recombinant) Adjuvanted, 2 dose 08/04/2020, 10/20/2020    Hepatitis B,  Pediatric/Adolescent 2000, 2000, 03/08/2001    HiB PRP-T 03/08/2001    IPV 2000, 01/05/2001, 10/04/2001, 05/04/2005    MMR 10/04/2001, 05/04/2005    Meningococcal Conjugate (MCV4O) 1 Vial Dose(10yr-55yr) 01/10/2012    Meningococcal Conjugate (MCV4P) 08/09/2018    Pneumococcal Conjugate - 13 Valent 02/26/2020    Pneumococcal Conjugate - 7 Valent 03/08/2001    Pneumococcal Polysaccharide - 23 Valent 08/04/2020    Tdap 01/10/2012, 04/12/2022    Varicella 10/04/2001, 08/19/2008   Flu shot: recommended yearly, declines   COVID vaccine/booster:  per CDC recommendations  RSV: after age 51 yo  Tetanus (Tdap):  4/2032  PCV 20: 8/2025  HPV: not sure, NA     Hepatitis A: today  Shingrix: had not had chickenpox as a child, low risk, recommended but declined    Review of Systems   Constitutional:  Negative for chills, fever and weight loss.   HENT:          No oral ulcers, dysphagia, oral thrush   Eyes:  Negative for blurred vision, pain and redness.   Respiratory:  Negative for cough and shortness of breath.    Cardiovascular:  Negative for chest pain.   Gastrointestinal:  Negative for abdominal pain, heartburn, nausea and vomiting.   Genitourinary:  Negative for dysuria and hematuria.   Musculoskeletal:  Negative for back pain and joint pain.   Skin:  Negative for rash.   Psychiatric/Behavioral:  Negative for depression. The patient is not nervous/anxious and does not have insomnia.      All Medical History/Surgical History/Family History/Social History/Allergies have been reviewed and updated in EMR    Outpatient Medications Marked as Taking for the 10/25/24 encounter (Office Visit) with Antonio Montgomery MD   Medication Sig Dispense Refill    buPROPion (WELLBUTRIN XL) 300 MG 24 hr tablet Take 300 mg by mouth once daily.      ergocalciferol (ERGOCALCIFEROL) 50,000 unit Cap TAKE 1 CAPSULE BY MOUTH ONE TIME PER WEEK 4 capsule 5    pantoprazole (PROTONIX) 40 MG tablet Take 1 tablet (40 mg total) by mouth 2  "(two) times daily. 180 tablet 3    prenatal 21/iron fu/folic acid (PRENATAL COMPLETE ORAL) Take 1 tablet by mouth once daily.      vedolizumab (ENTYVIO) 300 mg SolR injection Inject 300 mg into the vein every 28 days.       Vital Signs:  /83 (BP Location: Left arm, Patient Position: Sitting)   Pulse 99   Temp 98.1 °F (36.7 °C) (Temporal)   Ht 5' 7" (1.702 m)   Wt 78.2 kg (172 lb 6.4 oz)   SpO2 99%   BMI 27.00 kg/m²    Physical Exam  Constitutional:       General: She is not in acute distress.  Cardiovascular:      Rate and Rhythm: Normal rate and regular rhythm.      Pulses: Normal pulses.      Heart sounds: Normal heart sounds. No murmur heard.  Pulmonary:      Effort: Pulmonary effort is normal.      Breath sounds: Normal breath sounds.   Abdominal:      General: Bowel sounds are normal. There is no distension.      Palpations: Abdomen is soft.      Tenderness: There is no abdominal tenderness.     Labs:   Lab Results   Component Value Date    CRP 34.5 (H) 01/09/2024    CALPROTECTIN 282 (H) 10/07/2024     Lab Results   Component Value Date    HEPBSAG Non-reactive 09/04/2024    HEPBCAB Non-reactive 09/04/2024     Lab Results   Component Value Date    TBGOLDPLUS Negative 01/09/2024     Lab Results   Component Value Date    GVFLCERO11CM 41 03/23/2023    RMABYNQD34 1162 (H) 07/30/2024     Lab Results   Component Value Date    WBC 8.72 07/30/2024    HGB 11.7 (L) 07/30/2024    HCT 38.7 07/30/2024    MCV 84 07/30/2024     (H) 07/30/2024     Lab Results   Component Value Date    CREATININE 0.9 07/30/2024    ALBUMIN 3.6 09/04/2024    BILITOT 0.2 09/04/2024    ALKPHOS 121 09/04/2024    AST 26 09/04/2024    ALT 33 09/04/2024     Assessment/Plan:  Harper CAMPA is a 24 y.o. female with Crohn's disease with ileo-transverse anastomosis (gastroduodenal, ileum, pancolonic S/P ileocolonic resection due to stricture) and history of EIMS including oral ulcers, erythema nodosum and episcleritis, history of C diff " (1/2024- oral vancomycin).    Since last visit pt has stable bowel symptoms and overall feeling well and since optimizing entyvio to q 4 weeks she has improved trough VDZ levels.  She has ongoing oral ulcers and overall does not seem to correlate with active bowel symptoms and is trying some egg protein and will also try lysine for this.  She had a significantly elevated stool calprotectin which has decreased to near normal without any intervention and difficult to know why this occurred given overall pt feels her symptoms had not changed. We will repeat stool calprotectin to see if this has normalized. Pt is still concerned about entyvio not being as effective as stelara though to insurance change later this year we will revisit this early 1/2025. Plans for colonoscopy to be consider sometime in early 2025 though sooner if elevated stool calprotectin.     # Crohn's disease with ileo-transverse anastomosis (gastroduodenal, ileum, pancolonic S/P ileocolonic resection due to stricture):  - mild anemia- stable   - continue entyvio 300 mg IV q 4 weeks  - colonoscopy early 2025  - stool calprotectin 10/2024, 1/2025  - drug monitoring labs: CBC/CMP q 6 mos (9/2024), TPMT (normal 1/2024), TB quantiferon (1/2025), Hep B testing (1/2025)  - TDM: 9/4/24 trough VDZ    # Oral ulcers  - intermittent   - does not seem to correlate with IBD bowel disease activity  - pt started egg protein and will consider lysine  - will correlate this with activity of IBD    # Elevated ALT:  - repeat LFTs and serological workup with labs 9/2024 negative     # CT with multiple ovarian cysts  - defer to gyn    # Immunodeficiency due to long term immunosuppressive drug therapy and IBD specific health maintenance:  CRC risk- sx 4/2013, pancolonic, surveillance colonoscopy q 1-2 years   Skin exam- yearly- normal 3/2024, outside dermatologist   Risk for osteopenia/osteoporosis- none  Pap smear- yearly- normal 10/2023, defer to OB/GYN  Vitamin D- normal  3/2023, continue high dose vit D weekly-has been on this for years  Vaccines- no live vaccines, hep A # 1 today and # 2 6-12 mos later, declines flu, shingrix    Total time: 45 min    No follow-ups on file.    Antonio Montgomery MD  Department of Gastroenterology  Medical Director, Inflammatory Bowel Disease            Answers submitted by the patient for this visit:  Established Patient Questionnaire  (Submitted on 10/25/2024)  mouth sores: Yes

## 2024-10-28 ENCOUNTER — TELEPHONE (OUTPATIENT)
Dept: GASTROENTEROLOGY | Facility: CLINIC | Age: 24
End: 2024-10-28
Payer: COMMERCIAL

## 2024-10-30 ENCOUNTER — LAB VISIT (OUTPATIENT)
Dept: LAB | Facility: HOSPITAL | Age: 24
End: 2024-10-30
Attending: INTERNAL MEDICINE
Payer: COMMERCIAL

## 2024-10-30 DIAGNOSIS — Z79.899 IMMUNODEFICIENCY DUE TO LONG TERM IMMUNOSUPPRESSIVE DRUG THERAPY: ICD-10-CM

## 2024-10-30 DIAGNOSIS — D84.821 IMMUNODEFICIENCY DUE TO LONG TERM IMMUNOSUPPRESSIVE DRUG THERAPY: ICD-10-CM

## 2024-10-30 DIAGNOSIS — T45.1X5A IMMUNODEFICIENCY DUE TO LONG TERM IMMUNOSUPPRESSIVE DRUG THERAPY: ICD-10-CM

## 2024-10-30 DIAGNOSIS — K50.818 CROHN'S DISEASE OF BOTH SMALL AND LARGE INTESTINE WITH OTHER COMPLICATION: ICD-10-CM

## 2024-10-30 PROCEDURE — 83993 ASSAY FOR CALPROTECTIN FECAL: CPT | Performed by: INTERNAL MEDICINE

## 2024-11-04 ENCOUNTER — TELEPHONE (OUTPATIENT)
Dept: GASTROENTEROLOGY | Facility: CLINIC | Age: 24
End: 2024-11-04
Payer: COMMERCIAL

## 2024-11-04 LAB — CALPROTECTIN STL-MCNT: ABNORMAL MCG/G

## 2024-11-04 NOTE — TELEPHONE ENCOUNTER
----- Message from Antonio Montgomery MD sent at 11/4/2024  3:12 PM CST -----  Let patient know that her stool calprotectin is much higher than prior and I am concerned about this and feel it is best that we do colonoscopy this month if she is amenable. If I don't have availability see if she is open to having done with Dr. Frederick  Let me know once scheduled  SS  ----- Message -----  From: [facility] [employer] [facility]  Sent: 11/4/2024  12:55 PM CST  To: Antonio Montgomery MD

## 2024-11-05 ENCOUNTER — TELEPHONE (OUTPATIENT)
Dept: ENDOSCOPY | Facility: HOSPITAL | Age: 24
End: 2024-11-05
Payer: COMMERCIAL

## 2024-11-05 DIAGNOSIS — K50.919 CROHN'S DISEASE WITH COMPLICATION, UNSPECIFIED GASTROINTESTINAL TRACT LOCATION: Primary | ICD-10-CM

## 2024-11-05 NOTE — TELEPHONE ENCOUNTER
Per PM:  CD, with ileo-transverse anastomosis (gastroduodenal, ileum, pancolonic S/P ileocolonic resection due to stricture)     IBD meds:  -Entyvio every 4 weeks, LD: 10/30, ND:     CC: Elevated stool yuliana    - BM/d: 1-2, loose to formed, brown  - blood: occ mixed in stool and on TP  - mucous: Yes  Pain: Yes    - Location: mouth and throat    - Ratin/10    - Type of pain: ulcers  - Denies: fever, N/V, noc BM, false BR trips  - OV: 25    Dr. Montgomery will be updated.

## 2024-11-05 NOTE — TELEPHONE ENCOUNTER
"----- Message from BHAVYA Bello sent at 11/5/2024  9:54 AM CST -----  Procedure: Colonoscopy    Diagnosis: Crohn's disease    Procedure Timing: Within 4 weeks (Urgent) ## 1 - 2 weeks from now ##    #If within 4 weeks selected, please lenin as high priority#    #If greater than 12 weeks, please select "5-12 weeks" and delay sending until 2 months prior to requested date#     Provider: ADRIANA Montgomery but ok with Dr. Frederick, must be done before December    Location: Any Site    Additional Scheduling Information: No scheduling concerns    Prep Specifications:Standard prep    Is the patient taking a GLP-1 Agonist:no    Have you attached a patient to this message: yes  "

## 2024-11-05 NOTE — TELEPHONE ENCOUNTER
Referral for procedure from [facility]      Spoke to p to schedule procedure(s) Colonoscopy       Physician to perform procedure(s) Dr. ERWIN Montgomery  Date of Procedure (s) 11/11/24  Arrival Time 10:20 AM  Time of Procedure(s) 11:20 AM   Location of Procedure(s) [facility]  Type of Rx Prep sent to patient: Miralax  Instructions provided to patient via [facility]    Patient was informed on the following information and verbalized understanding. Screening questionnaire reviewed with patient and complete. If procedure requires anesthesia, a responsible adult needs to be present to accompany the patient home, patient cannot drive after receiving anesthesia. Appointment details are tentative, especially check-in time. Patient will receive a prep-op call 7 days prior to confirm check-in time for procedure. If applicable the patient should contact their pharmacy to verify Rx for procedure prep is ready for pick-up. Patient was advised to call the scheduling department at [phone] if pharmacy states no Rx is available. Patient was advised to call the endoscopy scheduling department if any questions or concerns arise.       Endoscopy Scheduling Department

## 2024-11-11 ENCOUNTER — ANESTHESIA EVENT (OUTPATIENT)
Dept: ENDOSCOPY | Facility: HOSPITAL | Age: 24
End: 2024-11-11
Payer: COMMERCIAL

## 2024-11-11 ENCOUNTER — HOSPITAL ENCOUNTER (OUTPATIENT)
Facility: HOSPITAL | Age: 24
Discharge: HOME | End: 2024-11-11
Attending: INTERNAL MEDICINE | Admitting: INTERNAL MEDICINE
Payer: COMMERCIAL

## 2024-11-11 ENCOUNTER — ANESTHESIA (OUTPATIENT)
Dept: ENDOSCOPY | Facility: HOSPITAL | Age: 24
End: 2024-11-11
Payer: COMMERCIAL

## 2024-11-11 VITALS
SYSTOLIC BLOOD PRESSURE: 107 MMHG | DIASTOLIC BLOOD PRESSURE: 63 MMHG | TEMPERATURE: 99 F | OXYGEN SATURATION: 100 % | HEIGHT: 67 IN | WEIGHT: 165 LBS | BODY MASS INDEX: 25.9 KG/M2 | RESPIRATION RATE: 14 BRPM | HEART RATE: 90 BPM

## 2024-11-11 DIAGNOSIS — K50.818 CROHN'S DISEASE OF BOTH SMALL AND LARGE INTESTINE WITH OTHER COMPLICATION: Primary | ICD-10-CM

## 2024-11-11 DIAGNOSIS — K50.90 CROHN'S DISEASE: ICD-10-CM

## 2024-11-11 LAB
B-HCG UR QL: NEGATIVE
CTP QC/QA: YES

## 2024-11-11 PROCEDURE — 45380 COLONOSCOPY AND BIOPSY: CPT | Mod: ,,, | Performed by: INTERNAL MEDICINE

## 2024-11-11 PROCEDURE — 27201012 HC FORCEPS, HOT/COLD, DISP: Performed by: INTERNAL MEDICINE

## 2024-11-11 PROCEDURE — 45380 COLONOSCOPY AND BIOPSY: CPT | Performed by: INTERNAL MEDICINE

## 2024-11-11 PROCEDURE — 88312 SPECIAL STAINS GROUP 1: CPT | Performed by: STUDENT IN AN ORGANIZED HEALTH CARE EDUCATION/TRAINING PROGRAM

## 2024-11-11 PROCEDURE — E9220 PRA ENDO ANESTHESIA: HCPCS | Mod: ,,,

## 2024-11-11 PROCEDURE — 37000009 HC ANESTHESIA EA ADD 15 MINS: Performed by: INTERNAL MEDICINE

## 2024-11-11 PROCEDURE — 37000008 HC ANESTHESIA 1ST 15 MINUTES: Performed by: INTERNAL MEDICINE

## 2024-11-11 PROCEDURE — 81025 URINE PREGNANCY TEST: CPT | Performed by: INTERNAL MEDICINE

## 2024-11-11 PROCEDURE — 63600175 PHARM REV CODE 636 W HCPCS

## 2024-11-11 PROCEDURE — 88305 TISSUE EXAM BY PATHOLOGIST: CPT | Performed by: STUDENT IN AN ORGANIZED HEALTH CARE EDUCATION/TRAINING PROGRAM

## 2024-11-11 PROCEDURE — 88342 IMHCHEM/IMCYTCHM 1ST ANTB: CPT | Mod: 59 | Performed by: STUDENT IN AN ORGANIZED HEALTH CARE EDUCATION/TRAINING PROGRAM

## 2024-11-11 RX ORDER — PROPOFOL 10 MG/ML
VIAL (ML) INTRAVENOUS
Status: DISCONTINUED | OUTPATIENT
Start: 2024-11-11 | End: 2024-11-11

## 2024-11-11 RX ORDER — SODIUM CHLORIDE 9 MG/ML
INJECTION, SOLUTION INTRAVENOUS CONTINUOUS
OUTPATIENT
Start: 2024-11-11

## 2024-11-11 RX ORDER — PREDNISONE 10 MG/1
40 TABLET ORAL DAILY
Qty: 120 TABLET | Refills: 0 | Status: SHIPPED | OUTPATIENT
Start: 2024-11-11

## 2024-11-11 RX ADMIN — PROPOFOL 100 MG: 10 INJECTION, EMULSION INTRAVENOUS at 11:11

## 2024-11-11 RX ADMIN — PROPOFOL 175 MCG/KG/MIN: 10 INJECTION, EMULSION INTRAVENOUS at 11:11

## 2024-11-11 NOTE — PROVATION PATIENT INSTRUCTIONS
Discharge Summary/Instructions after an Endoscopic Procedure  Patient Name: Harper Mallory  Patient MRN: [id number]  Patient YOB: 2000 Monday, November 11, 2024  Antonio Montgomery MD  Dear patient,  As a result of recent federal legislation (The Federal Cures Act), you may   receive lab or pathology results from your procedure in your [facility]   account before your physician is able to contact you. Your physician or   their representative will relay the results to you with their   recommendations at their soonest availability.  Thank you,  RESTRICTIONS:  During your procedure today, you received medications for sedation.  These   medications may affect your judgment, balance and coordination.  Therefore,   for 24 hours, you have the following restrictions:   - DO NOT drive a car, operate machinery, make legal/financial decisions,   sign important papers or drink alcohol.    ACTIVITY:  Today: no heavy lifting, straining or running due to procedural   sedation/anesthesia.  The following day: return to full activity including work.  DIET:  Eat and drink normally unless instructed otherwise.     TREATMENT FOR COMMON SIDE EFFECTS:  - Mild abdominal pain, nausea, belching, bloating or excessive gas:  rest,   eat lightly and use a heating pad.  - Sore Throat: treat with throat lozenges and/or gargle with warm salt   water.  - Because air was used during the procedure, expelling large amounts of air   from your rectum or belching is normal.  - If a bowel prep was taken, you may not have a bowel movement for 1-3 days.    This is normal.  SYMPTOMS TO WATCH FOR AND REPORT TO YOUR PHYSICIAN:  1. Abdominal pain or bloating, other than gas cramps.  2. Chest pain.  3. Back pain.  4. Signs of infection such as: chills or fever occurring within 24 hours   after the procedure.  5. Rectal bleeding, which would show as bright red, maroon, or black stools.   (A tablespoon of blood from the rectum is not serious, especially  if   hemorrhoids are present.)  6. Vomiting.  7. Weakness or dizziness.  GO DIRECTLY TO THE NEAREST EMERGENCY ROOM IF YOU HAVE ANY OF THE FOLLOWING:      Difficulty breathing              Chills and/or fever over 101 F   Persistent vomiting and/or vomiting blood   Severe abdominal pain   Severe chest pain   Black, tarry stools   Bleeding- more than one tablespoon   Any other symptom or condition that you feel may need urgent attention  Your doctor recommends these additional instructions:  If any biopsies were taken, your doctors clinic will contact you in 1 to 2   weeks with any results.  - Discharge patient to home.   - Patient has a contact number available for emergencies.  The signs and   symptoms of potential delayed complications were discussed with the   patient.  Return to normal activities tomorrow.  Written discharge   instructions were provided to the patient.   - Resume previous diet.   - Continue present medications.   - Await pathology results.   - Repeat colonoscopy (date not yet determined) to assess disease activity.   - Return to GI clinic- will schedule sooner appt once biopsies are back.  - Consider restart uceris foam BID and will consider course of entocort   (budesonide oral).    For questions, problems or results please call your physician - Antonio Montgomery MD at Work:  [phone].  [facility], EMERGENCY ROOM PHONE NUMBER: [phone]  IF A COMPLICATION OR EMERGENCY SITUATION ARISES AND YOU ARE UNABLE TO REACH   YOUR PHYSICIAN - GO DIRECTLY TO THE EMERGENCY ROOM.  Antonio Montgomery MD  11/11/2024 11:43:41 AM  This report has been verified and signed electronically.  Dear patient,  As a result of recent federal legislation (The Federal Cures Act), you may   receive lab or pathology results from your procedure in your [facility]   account before your physician is able to contact you. Your physician or   their representative will relay the results to you with their    recommendations at their soonest availability.  Thank you,  PROVATION

## 2024-11-11 NOTE — ANESTHESIA PREPROCEDURE EVALUATION
11/11/2024  Harper CAMPA is a 24 y.o., female.      Pre-op Assessment    I have reviewed the Patient Summary Reports.     I have reviewed the Nursing Notes. I have reviewed the NPO Status.   I have reviewed the Medications.     Review of Systems  Anesthesia Hx:  No problems with previous Anesthesia                Cardiovascular:  Exercise tolerance: good   Hypertension                                              Physical Exam  General: Well nourished, Alert and Oriented    Airway:  Mallampati: II / I  Mouth Opening: Normal  TM Distance: Normal  Tongue: Normal  Neck ROM: Normal ROM    Dental:  Intact    Chest/Lungs:  Clear to auscultation, Normal Respiratory Rate    Heart:  Rate: Normal  Rhythm: Regular Rhythm  Sounds: Normal        Anesthesia Plan  Type of Anesthesia, risks & benefits discussed:    Anesthesia Type: Gen Natural Airway  Intra-op Monitoring Plan: Standard ASA Monitors  Induction:  IV  Informed Consent: Informed consent signed with the Patient and all parties understand the risks and agree with anesthesia plan.  All questions answered.   ASA Score: 2  Day of Surgery Review of History & Physical: H&P Update referred to the surgeon/provider.    Ready For Surgery From Anesthesia Perspective.     .

## 2024-11-11 NOTE — TRANSFER OF CARE
"Anesthesia Transfer of Care Note    Patient: Harper CAMPA    Procedure(s) Performed: Procedure(s) (LRB):  COLONOSCOPY (N/A)    Patient location: GI    Anesthesia Type: general    Transport from OR: Transported from OR on 2-3 L/min O2 by NC with adequate spontaneous ventilation    Post pain: adequate analgesia    Post assessment: no apparent anesthetic complications    Post vital signs: stable    Level of consciousness: awake    Nausea/Vomiting: no nausea/vomiting    Complications: none    Transfer of care protocol was followed      Last vitals: Visit Vitals  BP (!) 95/50   Pulse 97   Temp 37.1 °C (98.8 °F)   Resp 15   Ht 5' 7" (1.702 m)   Wt 74.8 kg (165 lb)   LMP 11/04/2024 (Approximate)   SpO2 97%   Breastfeeding Yes   BMI 25.84 kg/m²     "

## 2024-11-11 NOTE — ANESTHESIA POSTPROCEDURE EVALUATION
Anesthesia Post Evaluation    Patient: Harper CAMPA    Procedure(s) Performed: Procedure(s) (LRB):  COLONOSCOPY (N/A)    Final Anesthesia Type: general      Patient location during evaluation: [facility]  Patient participation: Yes- Able to Participate  Level of consciousness: awake and alert and oriented  Post-procedure vital signs: reviewed and stable  Pain management: adequate  Airway patency: patent  ANGELO mitigation strategies: Multimodal analgesia  PONV status at discharge: No PONV  Anesthetic complications: no      Cardiovascular status: blood pressure returned to baseline and hemodynamically stable  Respiratory status: unassisted, spontaneous ventilation and room air  Hydration status: euvolemic  Follow-up not needed.              Vitals Value Taken Time   /63 11/11/24 1204   Temp 37.1 °C (98.8 °F) 11/11/24 1142   Pulse 90 11/11/24 1204   Resp 14 11/11/24 1156   SpO2 100 % 11/11/24 1204         Event Time   Out of Recovery 12:14:58         Pain/Bhavik Score: Bhavik Score: 10 (11/11/2024 11:43 AM)

## 2024-11-11 NOTE — H&P
Short Stay Endoscopy History and Physical    PCP - No, Primary Doctor  Referring Physician - Antonio Montgomery MD  [address] [place] [address]    Procedure - Colonoscopy  ASA - per anesthesia  Mallampati - per anesthesia  History of Anesthesia problems - no  Family history Anesthesia problems -  no   Plan of anesthesia - General    HPI  24 y.o. female  Reason for procedure:   Crohn's /fu  ROS:  Constitutional: No fevers, chills, No weight loss  CV: No chest pain  Pulm: No cough, No shortness of breath  GI: see HPI    Medical History:  has a past medical history of Crohn's disease with ileo-transverse anastomosis (gastroduodenal, pancolonic, ileum S/P ileal/right colon resection due to stricture), History of episcleritis (2024), History of Erythema nodosum, Hypertension, Immunodeficiency due to long term immunosuppressive drug therapy, and Oral ulcer.    Surgical History:  has a past surgical history that includes Bowel resection (2020);  section (N/A, 2021); Esophagogastroduodenoscopy (N/A, 2023); Colonoscopy (N/A, 2023);  section (N/A, 2024); Colonoscopy (N/A, 2024); and Esophagogastroduodenoscopy (N/A, 2024).    Family History: family history includes Diabetes type II in her paternal aunt, paternal aunt, paternal grandfather, and paternal grandmother..    Social History:  reports that she has never smoked. She has never used smokeless tobacco. She reports that she does not drink alcohol and does not use drugs.    Review of patient's allergies indicates:  No Known Allergies    Medications:   Medications Prior to Admission   Medication Sig Dispense Refill Last Dose/Taking    buPROPion (WELLBUTRIN XL) 300 MG 24 hr tablet Take 300 mg by mouth once daily.   11/10/2024    ergocalciferol (ERGOCALCIFEROL) 50,000 unit Cap TAKE 1 CAPSULE BY MOUTH ONE TIME PER WEEK 4 capsule 5 Past Week    pantoprazole (PROTONIX) 40 MG tablet Take 1 tablet (40 mg total)  by mouth 2 (two) times daily. 180 tablet 3 11/10/2024    prenatal 21/iron fu/folic acid (PRENATAL COMPLETE ORAL) Take 1 tablet by mouth once daily.   11/10/2024    vedolizumab (ENTYVIO) 300 mg SolR injection Inject 300 mg into the vein every 28 days.   10/30/2024    CYANOCOBALAMIN, VITAMIN B-12, ORAL Take 100 mcg by mouth every other day. 2,000 every other day (Patient not taking: Reported on 10/25/2024)          Physical Exam:    Vital Signs:   Vitals:    11/11/24 1044   BP:    Pulse: 103   Resp:    Temp:        General Appearance: Well appearing in no acute distress  Abdomen: Soft, non tender, non distended with normal bowel sounds, no masses    Labs:  Lab Results   Component Value Date    WBC 6.89 10/25/2024    HGB 11.1 (L) 10/25/2024    HCT 36.5 (L) 10/25/2024     10/25/2024    ALT 17 10/25/2024    AST 18 10/25/2024     10/25/2024    K 3.6 10/25/2024     10/25/2024    CREATININE 0.7 10/25/2024    BUN 5 (L) 10/25/2024    CO2 29 10/25/2024    TSH 0.98 04/12/2022    INR 1.0 10/25/2024       I have explained the risks and benefits of this endoscopic procedure to the patient including but not limited to bleeding, inflammation, infection, perforation, and death.      Antonio Montgomery MD

## 2024-11-13 LAB
FINAL PATHOLOGIC DIAGNOSIS: NORMAL
GROSS: NORMAL
Lab: NORMAL
MICROSCOPIC EXAM: NORMAL

## 2024-11-18 ENCOUNTER — LAB VISIT (OUTPATIENT)
Dept: LAB | Facility: HOSPITAL | Age: 24
End: 2024-11-18
Attending: INTERNAL MEDICINE
Payer: COMMERCIAL

## 2024-11-18 ENCOUNTER — OFFICE VISIT (OUTPATIENT)
Dept: GASTROENTEROLOGY | Facility: CLINIC | Age: 24
End: 2024-11-18
Payer: COMMERCIAL

## 2024-11-18 VITALS
TEMPERATURE: 97 F | DIASTOLIC BLOOD PRESSURE: 82 MMHG | HEIGHT: 67 IN | OXYGEN SATURATION: 98 % | BODY MASS INDEX: 26.19 KG/M2 | SYSTOLIC BLOOD PRESSURE: 123 MMHG | WEIGHT: 166.88 LBS | HEART RATE: 98 BPM

## 2024-11-18 DIAGNOSIS — K50.818 CROHN'S DISEASE OF BOTH SMALL AND LARGE INTESTINE WITH OTHER COMPLICATION: ICD-10-CM

## 2024-11-18 DIAGNOSIS — K50.818 CROHN'S DISEASE OF BOTH SMALL AND LARGE INTESTINE WITH OTHER COMPLICATION: Primary | ICD-10-CM

## 2024-11-18 DIAGNOSIS — K12.1 ORAL ULCER: ICD-10-CM

## 2024-11-18 PROCEDURE — 86480 TB TEST CELL IMMUN MEASURE: CPT | Performed by: INTERNAL MEDICINE

## 2024-11-18 PROCEDURE — 3008F BODY MASS INDEX DOCD: CPT | Mod: CPTII,S$GLB,, | Performed by: INTERNAL MEDICINE

## 2024-11-18 PROCEDURE — 3079F DIAST BP 80-89 MM HG: CPT | Mod: CPTII,S$GLB,, | Performed by: INTERNAL MEDICINE

## 2024-11-18 PROCEDURE — 99215 OFFICE O/P EST HI 40 MIN: CPT | Mod: S$GLB,,, | Performed by: INTERNAL MEDICINE

## 2024-11-18 PROCEDURE — G2211 COMPLEX E/M VISIT ADD ON: HCPCS | Mod: S$GLB,,, | Performed by: INTERNAL MEDICINE

## 2024-11-18 PROCEDURE — 1159F MED LIST DOCD IN RCRD: CPT | Mod: CPTII,S$GLB,, | Performed by: INTERNAL MEDICINE

## 2024-11-18 PROCEDURE — 3074F SYST BP LT 130 MM HG: CPT | Mod: CPTII,S$GLB,, | Performed by: INTERNAL MEDICINE

## 2024-11-18 PROCEDURE — 1160F RVW MEDS BY RX/DR IN RCRD: CPT | Mod: CPTII,S$GLB,, | Performed by: INTERNAL MEDICINE

## 2024-11-18 RX ORDER — HYDROCORTISONE 25 MG/G
CREAM TOPICAL
COMMUNITY
Start: 2024-10-25

## 2024-11-18 RX ORDER — KETOCONAZOLE 20 MG/G
1 CREAM TOPICAL 2 TIMES DAILY
COMMUNITY
Start: 2024-10-25

## 2024-11-18 NOTE — PATIENT INSTRUCTIONS
Continue Prednisone 40 mg per day. We will call you in 1 week for symptom update to guide Prednisone taper.  Send us a picture of your new insurance card as soon as possible. We will work on Skyrizi authorization with new plan.  Cancel Entyvio infusion on 11/27/24   TB test lab today  CMV lab tomorrow [facility]  Close follow up- next clinic visit in 2 months       Risankizumab (Skyrizi)     Class: Interleukin 23 Blocker - Biologic product     Mechanism of Action: blocks the p19 protein subunit used by the interleukin (IL) - 23 cytokines. IL-23 are naturally occurring cytokines that are involved in inflammatory and immune responses and play a role in the inflammatory process for Crohn's Disease.     Other indications: Psoriatic Arthritis, Plaque Psoriasis.     Dosage/ Route/ Frequency: An intravenous (IV) infusion on week 0, 4, and 8, then inject under the skin in the subcutaneous tissue on week 12 then every 8 weeks.  - Loading Dose: 600mg IV infusion on week 0, 4, 8. Infusion can take 1-2 hours.  - Maintenance Dose: 360 mg SC injections via an on-body injection starting at week 12 then every 8 weeks. These injections can be done at home and take 5 minutes.     Side effects  Please notify us if you are treated with antibiotics or experience signs of infection (ie. fevers, chills, sores, etc) given we may need to hold your medication during this time.      Common side effects (>3% or 3 in 100 people): upper respiratory infection (sore throat, runny nose), headache, joint pain, injection site redness, stomach pain, low red blood cells, back pain, fever, urinary tract infection.      Please call us immediately if you develop any of the below problems:     Serious side effects:      Serious infections: This medication may increase your risk of developing viral, bacterial and/or fungal infections.      Allergic or Hypersensitivity reaction- hives (rash), difficulty breathing, chest pain/tightness, high or low  blood pressure, swelling of the face and hands, fever or chills     Liver injury: one case of liver injury with rash that lead to hospitalization. Watch out for symptoms such as unexplained rash, nausea, vomiting, stomach pain, jaundice (yellowing of skin or eyes), loss of appetite, dark urine or tiredness.      Labs/Monitoring:  Prior to starting this new agent, we will be checking labs to determine the safety of taking this medication including baseline blood counts, liver and kidney function tests, TB test and viral hepatitis testing.   Once started on the therapy we will monitor your blood count and your liver and kidney function tests as needed following evidence-based guidelines. We will repeat liver function labs before you start the maintenance dose. TB test and viral hepatitis tests will be repeated every year. If you have any risk of exposure or travel to high-risk areas please notify us so we can do this testing sooner. If indicated your provider may also choose to check drug levels to monitor or optimize your response to the medication.   We recommend you do not start the home injection on a Sunday for lab purposes.      Storage recommendations:  Keep refrigerated in temperature between 36°F to 46°F (2°C to 8°C).  When ready to use, take the carton out of the refrigerator and leave it at room temperature for at least 45 minutes up to 90 minutes to allow the medication to warm.  Do NOT shake and do NOT freeze      Vaccine counseling:   Avoid live vaccines which include:  Intranasal Influenza LAIV4 (FluMist Quadrivalent)  Measles, Mumps, Rubella (MMR)  Rotavirus (RotaTeq, Rotarix)  Typhoid oral capsule (Vivotif)  Varicella (Varivax)  Smallpox (Vaccinia)  Yellow Fever (YF-Vax)  Adenovirus  Cholera (Vaxchora)     Avoid consumption of raw seafood such as oysters/sushi.

## 2024-11-18 NOTE — PROGRESS NOTES
[facility]             Inflammatory Bowel Disease   Follow-up  Note              TODAY'S VISIT DATE:  11/18/2024    Chief Complaint:   Chief Complaint   Patient presents with    Crohn's Disease     PCP: No, Primary Doctor    Previous History:  Harper CAMPA is a 24 y.o. female  Crohn's disease with ileo-transverse anastomosis (gastroduodenal, ileum, pancolonic S/P ileocolonic resection due to stricture) and history of EIMS including oral ulcers, episcleritis (6/2016), erythema nodosum (8664-8333), PCOS, osteopenia, history of C diff (1/2024- oral vancomycin). She was doing well until 4/2013 when she had diarrhea, N/V, wt loss, oral ulcers and underwent EGD and colonoscopy with finding c/w Crohn's disease (gastroduodenal, pan-colonic, ileum).  She was initially placed on corticosteroids and remicade from 4434-0555 and was a secondary nonresponder due to developing high abs despite adding 6MP in 2015 (noral TPM 30). In approximately 2014 or 2015 she had erythema nodosum LE and recalls prednisone helped symptoms and she had no recurrence. Even on remicade she feels like there was not a period of deep remission though likely a partial response. She was hospitalized in 2/2016 and underwent EGD significant for endoscopically linear erythema and white plaques in the entire esophagus with bx c/w mild acute esophagitis throughout the esophagus, HP neg moderate chronic active gastritis.  Colonoscopy significant for multiple perianal skin tags and active inflammation throughout the entire colon, ICV and ileum. She was discharged home with steroids (tapered off within 2 mos) and enteral nutrition through NGT. In approximately 2015 she was not compliant with 6MP because difficult to swallow pills due to painful LAD in neck.  She was then started on Humira and continued 6MP 50 mg/d in 3/2016. In 6/2016 she was diagnosed with episcleritis treated with steroid eye drops. In 9/2016 she was optimized to  humira 40 mg SC weekly..  In 9/2016 pt started on budesonide 9 mg/d.  By 12/2016 her 6MP was decreased to 25 mg/d due to elevated 6TG 430. In 4/2017 EGD significanat for some focal gastric metaplasia on duodenal biopsies with moderate inflammation rectum, sigmoid, transverse, right colon and mild inflammation left colon. In 1/2017 calprotectin 891 and 2/2017 calprotectin 390. In 2/2017 MRE with diffuse inflammation in cecum and TI.   In 2/2017 humira levels 11.8/Abs neg.  In 3/2017 her 6TG 248/6MMP 2309, humira levels 11.6. In 8/2017 6mP changed from 50 mg/d to alternating 75 mg and 50 mg/d.  In 11/2017 calprotectin >1250 and in 5/2018 1030. In 6/2018 6TG 174, 6MMP 1847 and in 1/2019 6TG 287/6MMP 6350.  8/2018 humira 17.8. Overall she felt humira worked better than remicade and overall seemed to be close to remission.  In 6/2019 EGD normal except for pancreatic rest in the prepyloric area and colonoscopy normal with localized 4 mm inflammatory stricture at the transition zone from ascending to cecum though normal cecal tissue through the stricture.  Unable to evaluate ICV or ileum. In 6/2019 stool calprotectin 179.  MRE 7/2019 significant for circumferential wall thickening with transmural enhancement of the TI and cecum extending to the mid right colon.  Due to stricture she was referred to pediatric surgeon, Dr. Monteiro but surgery was deferred.  In 1/2020 colonoscopy significant for perianal skin tags with pan-colonic inflammation (right colon worse than left colon), previous stricture in proximal ascending improved, ICV stricture with ulceration and friability though unable to pass colonoscopy through stricture with TI inflammation.  In 1/2020 EGD significant for spontaneous antral granularity with oozing and inflammation/erythema of the fundus with normal duodenum. She met Dr. Daniels 2/2020 at which time she had normal BMs with nausea and intermittent vomiting q 2 weeks.  Colonoscopy 3/2020 significant for  ulcerated Crohn's disease with moderate stricture in the ascending colon not traversed and tatoo placed with distal colon with mild erythema without any ulcers and normal sigmoid and rectum. MRE 3/2020 significant for wall thickening of the distal ileum at the ICV with narrowing  with second 2.6 cm ileum with narrowing located 2.4 cm proximal to this area. In 2020 patient underwent laparoscopic right hemicolectomy (resection of TI, ccume, right colon) with surgical path c/w mild chronic active ileitis and colitis with transmural inflammation, focal granuloma, perineural inflammation.  Postoperatively she continued on Humira 40 mg SC weekly and 6MP alternating 50 mg/75 mg/d.  In 2020 EGD normal and colonoscopy significant for mild and patchy apthous ulcers in sigmoid colon with remainder of the colon normal with side to side ileocolonic anastomosis in the transverse colon and anastomosis ulcerated with blind end with ulcer and neoterminal ileum with >5 apthous ulcers c/w Rutgeert's score i2.  Fecal calprotectin 263 and CRP elevated at 1.6.  Humira with 6MP discontinued 2020.  She started stelara 9/10/20. Due to low stelara levels her dose was optimized to 90 mg SC q 4 weeks by 2021.  In 2021 fecal calprotectin 50 and CRP 1.9. She delivered health term baby by  21 and per notes prior to delivery she had slight elevation in her fecal calprotectin.  In 10/2021 EGD significant for LA Grade A esophagitis and colonoscopy significant for large perianal skin tags with side to side ileocolonic anastomosis in the transverse colon that is patent and 2 small ulcers at the ileotransverse anastomosis and normal neoterminal ileum. Dr[facility]um at [facility] patient to  2022 at which time she continued on stelara 90 mg SC q 4 weeks though mention of slightly elevated stool calprotectin 181. On 10/7/22 patient received reinduction with stelara IV and then continued on stelara 90  mg SC q 4 weeks.  In 3/2023 stool calprotectin 242 and CRP 21.  In 3/2023 she had wisdom teeth removed and was on intermittent NSAIDs.  On 5/12/23 colonoscopy significant for perianal skin tags, normal ileum with ileocolonic anastomotic ulceration, and erythematous/hemorrhagic/inflammed nodular ulcerated mucosa in rectum and sigmoid colon (bx rectum/sigmoid/descending with mild active inflammation). EGD 5/2023 significant for mild gastric fundus and antrum with inflammation (bx HP neg moderate chronic gastritis, rare non-necrotizing granuloma) and normal duodenum. In 6/21/23 patient started on entyvio and subsequently established care with Dr. Grier at [facility] in 7/2023. Stool calprotectin elevated in 7/2023 (145), 10/2023 (719), 11/2023 (293). I met patient initially 1/2024 when she was in her 2nd trimester of pregnancy and she had continued on entyvio q 8 weeks and reported intermittent upper abdominal cramping, RUQ that woke her up in the middle of the night, 2-3 loose to watery BMS/d with mucous and streaks of blood on toilet paper with perianal skin tags, oral ulcers that had resolved but were active 12/2023.  In 1/2024 C diff neg/PCR pos and took 10 day course of oral vancomycin w./o change in symptoms. She had trouble swallowing and mouth sores with had some benefit with magic mouthwash and colgate peroxyl.  Due to symptoms of loose stool, occ notruanl BMS we proceeded with uceris foam (1/23/24-2/20/24) and we planned to check stool calprotectin after course of uceris foam. On 2/22/24 stool calprotectin 579 and pt started on uceris foam qhs followed by medrol dose guy approximately 2/27/24-3/3/24 and repeat stool calprotectin 3/7/24 690. On 3/11/24 she reported dysphagia due to recurrent oral ulcers and 1-2 loose to formed BMs/d with 1 nocturnal BM once a twice/wk and due to this uceris foam increased to BID for 2 weeks but then she self discontinued due to running out and sx stable.  On 4/11/24 she  called due to 2 weeks of worsening symptoms with 1-2 loose to formed BMs/d with 1-2 nocturnal BMs each week, blood dripping in the toilet, upper abdominal pain, occ nausea and ongoing oral and throat ulcers.  She took 8 week course of entocort 9 mg daily (24-24). On 24 trough VDZ 3.1 on entyvio q 8 weeks. She delivered healthy term baby by   on 24. We then proceeded with stool calprotectin, EGD/colonoscopy and trough VDZ levels to restage her disease after delivery.  Stool calprotectin 2024 151.  On 24 EGD normal and colonoscopy significant for large perianal skin tags with mild to moderate distal rectum inflammation with a localized area of moderate inflammation at 25 cm, one localized area of inflammation characterized by erythema and apthous ulcer in the sigmoid colon. From 25 cm to the ileo-ascending anastomosis normal colon with severe inflammation at the ileoascending anastomosis and normal neoterminal ileum.  On 24 ALT elevated to 60 with 24 serological liver workup and repeat LFTs negative.  2024 MRE showed inflammatory CD without luminal narrowing involving 3 areas, including the terminal ileum, proximal colon, and distal rectum with associated enhancement, mild wall thickening, and diffusion restriction.  This is favored to represent subacute inflammation versus less likely acute inflammation. Abundant cystic lesions in the bilateral ovaries, which be seen in the setting of polycystic ovarian disease.  Hepatomegaly.  On 9/3/24 pt reported  3-5 loose to watery BMs/d with 1-2 with blood mixed in stool.  On 24, stool culture and C diff were negative and calprotectin increased to 3100.  24 Entyvio trough level resulted 18 (therapeutic) on 300 mg IV every 4 weeks.  By 10/7/24, her stool calprotectin decreased to 282 (near normal) without any intervention and patient reported overall stable symptoms around that time therefore it was difficult to know why the  calprotectin decreased.  She still had ongoing oral ulcers that seemed to not correlate with active bowel symptoms.  Repeat calprotectin was recommended with consideration for sooner colonoscopy if elevated.      Interval History:  - current IBD meds: Entyvio 300 mg IV q 4 weeks (started 6/2023, 300 mg IV q 4 weeks 7/1/24, LD 10/30, ND due 11/27); Prednisone 40 mg/d (started 11/11/24)  - recent IBD meds: entocort 9 mg daily (4/11/24-6/5/24)  - 1-2 loose to formed BM. No nocturnal.  Blood mixed in the stool- stable amount (quarter size throughout).  Urgency improved. Some abd cramping before BM.    - reports improvement in BM since starting Prednisone  - oral ulcers resolved 11/11/24-11/15/24, slightly recurred starting 11/16/24- still much better compared to before starting prednisone, eating more comfortably   - stopped taking egg protein powder a week ago- wasn't helpful and was painful to eat  - did not start lysine as did not find enough data in breastfeeding to feel comfortable   - breastfeeding    - 10/25/24 CRP elevated (20.7)- worsened compared to prior; iron low (19) and saturated iron low (6%)  - 10/30/24 calprotectin worsened to 4430.  Repeat colonoscopy this month recommended,.  - 11/4/24: Patient reported 1-2 loose to formed BM/d with blood occasionally mixed in stool and on TP.  Pt endorsed mucous in stool and 7/10 pain associated with mouth and throat ulcers.    - 11/11/24 colonoscopy:  Perianal skin tags. Moderate to severe inflammation of rectum and rectosigmoid colon. Mild localized area of very mild inflammation in the sigmoid colon. Normal descending and transverse colon. Moderate to severe inflammation at the ileocolonic anastomosis and involving entry of the neoterminal ileum. The examined portion of the ileum was normal.  Biopsies christy-terminal ileum with patchy minimally active enteritis. Ileo-colonic anastomosis with severely active chronic enteritis with ulceration, focal minute intramucosal  non-necrotizing granuloma, rare CMV immunopositive cells without evidence of viral cytopathic effect.  Transverse and descending colon normal. Sigmoid colon with focal minimally active colitis with Paneth cell metaplasia, suggestive of chronicity.  Rectosigmiod colon with severely active chronic colitis with extensive superficial erosion, intramucosal non-necrotizing granulomas, rare CMV immunopositive cells without evidence of viral cytopathic effect, AFB and BMS stains negative.  Rectum with mild-to-moderately active chronic proctitis with focal superficial erosion, CMV negative.  - stool calprotectin: 7/1/24 151, 9/4/24 3100, 9/10/24 1180, 10/7/24 282, 10/30/24 4430  - NSAID use: No  - Narcotic use: No  - Alternative/complementary meds for IBD: No    Prior Pertinent Surgeries:   5/2020 laparoscopic right hemicolectomy (resection of TI, ccume, right colon) with surgical path c/w mild chronic active ileitis and colitis with transmural inflammation, focal granuloma, perineural inflammation    Last pertinent Endoscopy/Imaging:  3/12/20 MRE: wall thickening of the distal ileum at the level of the ileocecal valve with narrowing of the lumen, with the lumen measuring up to 3 mm. Approximately 2.4 cm proximal to this area is an additional area of circumferential wall thickening measuring approximately 2.6 cm in length with luminal narrowing measuring as narrow as 3 mm. Abnormal motion and lack of peristalsis is noted on the cine images within this area. Restricted diffusion is noted within the bowel wall within this area with minimal perienteric edema   7/30/24 EGD:  normal esophagus, normal stomach, normal duodenum  8/2024 MRE:  Inflammatory CD without luminal narrowing involving 3 areas, including the terminal ileum, proximal colon, and distal rectum with associated enhancement, mild wall thickening, and diffusion restriction.  This is favored to represent subacute inflammation versus less likely acute inflammation.  Abundant cystic lesions in the bilateral ovaries, which be seen in the setting of polycystic ovarian disease.  Hepatomegaly.   11/11/24 colonoscopy:  Perianal skin tags. Moderate to severe inflammation of rectum and rectosigmoid colon. Mild localized area of very mild inflammation in the sigmoid colon. Normal descending and transverse colon. Moderate to severe inflammation at the ileocolonic anastomosis and involving entry of the neoterminal ileum. The examined portion of the ileum was normal.  Biopsies christy-terminal ileum with patchy minimally active enteritis. Ileo-colonic anastomosis with severely active chronic enteritis with ulceration, focal minute intramucosal non-necrotizing granuloma, rare CMV immunopositive cells without evidence of viral cytopathic effect.  Transverse and descending colon normal. Sigmoid colon with focal minimally active colitis with Paneth cell metaplasia, suggestive of chronicity.  Rectosigmiod colon with severely active chronic colitis with extensive superficial erosion, intramucosal non-necrotizing granulomas, rare CMV immunopositive cells without evidence of viral cytopathic effect, AFB and BMS stains negative.  Rectum with mild-to-moderately active chronic proctitis with focal superficial erosion, CMV negative.    Therapeutic Drug Monitoring Labs:  12/2016 6TG 430  2/2017 humira levels 11.8/Abs neg  3/2017 her 6TG 248/6MMP 2309, humira levels 11.6  6/2018 6TG 174, 6MMP 1847   1/2019 6TG 287/6MMP 6350  8/2018 humira 17.8  5/9/24 trough VDZ 3.1  9/4/24- trough VDZ 18 (300 mg IV every 4 weeks)    Prior IBD Therapies:  Entocort- 2016, repeat course 4/2024-6/2024  6MP 2171-5949 (12/2016 25 mg/d, 8/2017 alternating 50 mg and 75 mg/d, stopped 9/2020)  Remicade 3823-0906- secondary nonresponder due to antibodies  Humira 40 mg SC weekly (3/2016-9/2020, weekly 9/2016)- secondary nonresponder  Stelara 90 mg SC q 4 weeks (started 9/10/20, 90 mg SC q 4 weeks Feb/March 2021)- effective and  possibly had inflammation on scope due to NSAIDs  uceris foam BID (started 1/23/24-2/20/24, 3/11-approximately 3/25/24)- effective  Medrol dose guy 2/27/24-3/3/24- effective     Vaccinations:  Lab Results   Component Value Date    HEPBSURFABQU POSITIVE 01/09/2024    HEPBSURFABQU 68 01/09/2024     Lab Results   Component Value Date    HEPAIGG Non-reactive 01/09/2024     Lab Results   Component Value Date    VARICELLAZOS 77.38 01/09/2024    VARICELLAINT Negative 01/09/2024     Lab Results   Component Value Date    MUMPSIGGSCRE 63.00 01/09/2024    MUMPSIGGINTE Positive 01/09/2024      Lab Results   Component Value Date    RUBEOLAIGGAN >300.00 01/09/2024    RUBEOLAINTER Positive 01/09/2024     Immunization History   Administered Date(s) Administered    DTaP 2000, 01/05/2001, 03/08/2001, 10/04/2001, 05/04/2005    HIB 2000, 01/05/2001, 03/18/2001, 10/04/2001    Hepatitis A, Adult 10/25/2024    Hepatitis B (recombinant) Adjuvanted, 2 dose 08/04/2020, 10/20/2020    Hepatitis B, Pediatric/Adolescent 2000, 2000, 03/08/2001    HiB PRP-T 03/08/2001    IPV 2000, 01/05/2001, 10/04/2001, 05/04/2005    MMR 10/04/2001, 05/04/2005    Meningococcal Conjugate (MCV4O) 1 Vial Dose(10yr-55yr) 01/10/2012    Meningococcal Conjugate (MCV4P) 08/09/2018    Pneumococcal Conjugate - 13 Valent 02/26/2020    Pneumococcal Conjugate - 7 Valent 03/08/2001    Pneumococcal Polysaccharide - 23 Valent 08/04/2020    Tdap 01/10/2012, 04/12/2022    Varicella 10/04/2001, 08/19/2008   Flu shot: recommended yearly, declines   COVID vaccine/booster:  per CDC recommendations  RSV: after age 49 yo  Tetanus (Tdap):  4/2032  PCV 20: 8/2025  HPV: not sure, NA     Hepatitis A: Dose #2 due between 4/25/25-10/25/25  Shingrix: had not had chickenpox as a child, low risk, recommended but declined    Review of Systems   Constitutional:  Negative for chills, fever and weight loss.   HENT:          No oral ulcers, dysphagia, oral thrush   Eyes:  " Negative for blurred vision, pain and redness.   Respiratory:  Negative for cough and shortness of breath.    Cardiovascular:  Negative for chest pain.   Gastrointestinal:  Negative for abdominal pain, heartburn, nausea and vomiting.   Genitourinary:  Negative for dysuria and hematuria.   Musculoskeletal:  Negative for back pain and joint pain.   Skin:  Negative for rash.   Psychiatric/Behavioral:  Negative for depression. The patient is not nervous/anxious and does not have insomnia.      All Medical History/Surgical History/Family History/Social History/Allergies have been reviewed and updated in EMR    Outpatient Medications Marked as Taking for the 11/18/24 encounter (Office Visit) with Antonio Montgomery MD   Medication Sig Dispense Refill    buPROPion (WELLBUTRIN XL) 300 MG 24 hr tablet Take 300 mg by mouth once daily.      ergocalciferol (ERGOCALCIFEROL) 50,000 unit Cap TAKE 1 CAPSULE BY MOUTH ONE TIME PER WEEK 4 capsule 5    hydrocortisone 2.5 % cream Apply topically.      ketoconazole (NIZORAL) 2 % cream Apply 1 application  topically 2 (two) times daily.      pantoprazole (PROTONIX) 40 MG tablet Take 1 tablet (40 mg total) by mouth 2 (two) times daily. 180 tablet 3    predniSONE (DELTASONE) 10 MG tablet Take 4 tablets (40 mg total) by mouth once daily. Dr. Montgomery will be tapering prednisone weekly based on symptoms 120 tablet 0    prenatal 21/iron fu/folic acid (PRENATAL COMPLETE ORAL) Take 1 tablet by mouth once daily.      vedolizumab (ENTYVIO) 300 mg SolR injection Inject 300 mg into the vein every 28 days.       Vital Signs:  /82 (BP Location: Left arm, Patient Position: Sitting)   Pulse 98   Temp 97.3 °F (36.3 °C) (Temporal)   Ht 5' 7" (1.702 m)   Wt 75.7 kg (166 lb 14.2 oz)   LMP 11/04/2024 (Approximate)   SpO2 98%   BMI 26.14 kg/m²    Physical Exam  Constitutional:       General: She is not in acute distress.  Cardiovascular:      Rate and Rhythm: Normal rate and regular rhythm.      " Pulses: Normal pulses.      Heart sounds: Normal heart sounds. No murmur heard.  Pulmonary:      Effort: Pulmonary effort is normal.      Breath sounds: Normal breath sounds.   Abdominal:      General: Bowel sounds are normal. There is no distension.      Palpations: Abdomen is soft.      Tenderness: There is no abdominal tenderness.     Labs:   Lab Results   Component Value Date    CRP 20.7 (H) 10/25/2024    CALPROTECTIN 4,430.0 (H) 10/30/2024     Lab Results   Component Value Date    HEPBSAG Non-reactive 09/04/2024    HEPBCAB Non-reactive 09/04/2024     Lab Results   Component Value Date    TBGOLDPLUS Negative 01/09/2024     Lab Results   Component Value Date    VPOEYPNK17FE 41 03/23/2023    USGHPSQM18 731 10/25/2024     Lab Results   Component Value Date    WBC 6.89 10/25/2024    HGB 11.1 (L) 10/25/2024    HCT 36.5 (L) 10/25/2024    MCV 81 (L) 10/25/2024     10/25/2024     Lab Results   Component Value Date    CREATININE 0.7 10/25/2024    ALBUMIN 3.3 (L) 10/25/2024    BILITOT 0.2 10/25/2024    ALKPHOS 93 10/25/2024    AST 18 10/25/2024    ALT 17 10/25/2024     Assessment/Plan:  Harper CAMPA is a 24 y.o. female with Crohn's disease with ileo-transverse anastomosis (gastroduodenal, ileum, pancolonic S/P ileocolonic resection due to stricture) and history of EIMS including oral ulcers, erythema nodosum and episcleritis, history of C diff (1/2024- oral vancomycin).     Patient's 11/11/24 colonoscopy shows endoscopic progression of Crohn's disease on dose optimized Entyvio with therapeutic drug level.  Given rare CMV immunopositive cells on colonoscopy, CMV not likely cause of the inflammation but will check CMV DNA PCR.  Patient has responded well symptomatically to Prednisone.  Will continue Prednisone taper for induction.  We reviewed alternative maintenance treatment options today given Entyvio not effective.  Patient has lost response to 2 TNF inhibitors.  Avoiding Rinvoq given it can interfere with  placental formation and patient plans on having more children. Patient previously responded very well to Stelara therefore interleukin inhibitor class preferred.  We reviewed the possibility of immunogenicity with Stelara given past exposure.  We also reviewed the SEQUENCE trial and clinical superiority of Skyrizi compared to to Stelara in Crohn's treatment. Patient is in agreement with starting Skyrizi.  She will have new insurance starting 24 so will wait until then to start the auth process.       # Crohn's disease with ileo-transverse anastomosis (gastroduodenal, ileum, pancolonic S/P ileocolonic resection due to stricture):  - mild anemia- stable. 10/2024 iron and saturated iron  low- Pre- vitamin started a year ago contains 28 mg elemental iron- expect improvement when Crohn's disease under better control.  No additional iron supplements needed at this time.     - stop entyvio- last dose was 10/30/24  - continue prednisone 40 mg/day for another week. Next RN symptom update on 24 for taper  - we will work on skyrizi authorization when new insurance is effective (24)  - CMV DNA PCR- lab appt scheduled 24  - repeat stool calprotectin TBD (3 months after starting Skyrizi versus when prednisone tapered off- whichever is first)   - colonoscopy 6-12 months after starting Skyrizi  - drug monitoring labs: CBC/CMP (repeat with Skyrizi infusion #3 and then q6 months), TPMT (normal 2024), TB quantiferon (today), Hep B testing (2025)    # Colon biopsies with rare CMV immunopositive cells:  - likely CMV innocent bystander  - will check CMV DNA PCR today     # Oral ulcers  - essentially resolved on prednisone  - patient stopped egg protein about a week ago and deferred starting lysine supplement   - will correlate this with activity of IBD    # Elevated ALT- resolved  - repeat LFTs and serological workup with labs 2024 negative     # CT with multiple ovarian cysts  - defer to gyn    #  Immunodeficiency due to long term immunosuppressive drug therapy and IBD specific health maintenance:  CRC risk- sx 4/2013, pancolonic, surveillance colonoscopy q 1-2 years   Skin exam- yearly- normal 3/2024, outside dermatologist   Risk for osteopenia/osteoporosis- none  Pap smear- yearly- normal 10/2023, defer to OB/GYN  Vitamin D- normal 3/2023, continue high dose vit D weekly-has been on this for years  Vaccines- no live vaccines, hep A # 2 due between 4/25/25-10/25/25, declines flu, shingrix    I personally examined the patient and discussed above plan in collaboration with Teri Franklin, PharmD.      Visit today is associated with current or anticipated ongoing medical care related to this patient's single serious condition/complex condition Crohn's disease.     Follow up in about 2 months (around 1/18/2025).    Antonio Montgomery MD  Department of Gastroenterology  Medical Director, Inflammatory Bowel Disease

## 2024-11-18 NOTE — PROGRESS NOTES
[facility]  Inflammatory Bowel Disease  Pharmacy Note    TODAY'S VISIT DATE:  11/18/2024    Reason for visit:  Mark counseling    IBD treatment to be initiated/optimized:  Mark    IBD MD:   Ulises     Pertinent History:  IBD phenotype: Crohn's disease with ileo-transverse anastomosis (gastroduodenal, ileum, pancolonic S/P ileocolonic resection due to stricture)   Symptoms of infection (current or past 1 week)? (fever >100.4 F, URI, flu-like symptoms, cough, painful urination, warm/red/painful skin or skin ulcers/wounds, tooth pain): No  Recent Antibiotics use in the last 30 days: No  Dispensing pharmacy/infusion center:   [facility] for Skyrizi  Current therapy: Entyvio 300 mg IV q 4 weeks (started 6/2023, 300 mg IV q 4 weeks 7/1/24, LD 10/30, ND 11/27); Prednisone 40 mg/d (started 11/11/24)    Review of patient's allergies indicates:  No Known Allergies      Current Medications:   Outpatient Medications Marked as Taking for the 11/18/24 encounter (Office Visit) with Antonio Montgomery MD   Medication Sig Dispense Refill    buPROPion (WELLBUTRIN XL) 300 MG 24 hr tablet Take 300 mg by mouth once daily.      ergocalciferol (ERGOCALCIFEROL) 50,000 unit Cap TAKE 1 CAPSULE BY MOUTH ONE TIME PER WEEK 4 capsule 5    hydrocortisone 2.5 % cream Apply topically.      ketoconazole (NIZORAL) 2 % cream Apply 1 application  topically 2 (two) times daily.      pantoprazole (PROTONIX) 40 MG tablet Take 1 tablet (40 mg total) by mouth 2 (two) times daily. 180 tablet 3    predniSONE (DELTASONE) 10 MG tablet Take 4 tablets (40 mg total) by mouth once daily. Dr. Montgomery will be tapering prednisone weekly based on symptoms 120 tablet 0    prenatal 21/iron fu/folic acid (PRENATAL COMPLETE ORAL) Take 1 tablet by mouth once daily.      vedolizumab (ENTYVIO) 300 mg SolR injection Inject 300 mg into the vein every 28 days.         Reviewed all current medications including OTC, herbals and supplements.     Labs:  "  Lab Results   Component Value Date    HEPBSAG Non-reactive 09/04/2024    HEPBCAB Non-reactive 09/04/2024     Lab Results   Component Value Date    TBGOLDPLUS Negative 01/09/2024     No results found for: "QUANTNILVALU"  Lab Results   Component Value Date    EPPFROKG75DR 41 03/23/2023    NUKNCPVG35 731 10/25/2024     Lab Results   Component Value Date    WBC 6.89 10/25/2024    HGB 11.1 (L) 10/25/2024    HCT 36.5 (L) 10/25/2024    MCV 81 (L) 10/25/2024     10/25/2024     Lab Results   Component Value Date    CREATININE 0.7 10/25/2024    ALBUMIN 3.3 (L) 10/25/2024    BILITOT 0.2 10/25/2024    ALKPHOS 93 10/25/2024    AST 18 10/25/2024    ALT 17 10/25/2024         Assessment/Plan:  Harper CAMPA is a 24 y.o. female that  has a past medical history of Crohn's disease with ileo-transverse anastomosis (gastroduodenal, pancolonic, ileum S/P ileal/right colon resection due to stricture), History of episcleritis, History of Erythema nodosum, Hypertension, Immunodeficiency due to long term immunosuppressive drug therapy, and Oral ulcer.    # Recommendations:  - Educated patient on mechanism of action, frequency and route of administration, onset of action, and safety profile of Skyrizi  - Encouraged pt to practice proper hand hygiene considering increased risk of infection with biologics. Pt to make us aware if she ever experiences s/sx of an infection including fever, chills, URI symptoms or UTI symptoms.   - Labs up to date: CBC/CMP 10/2024, repeat with Skyrizi infusion #3 and then q6 months; TB today, repeat 11/2025; Hep B 9/2024, repeat 9/2025  - Discussed importance of immunizations considering the increased risk of infections. Patient declined flu shot.  - Pt to avoid live vaccines and uncooked/ raw seafood such as raw oysters or sushi.   - Advised pt to use sun protection and get annual skin checks considering possible increased risk of skin cancer   - Recommended against use of NSAIDs including " motrin,ibuprofen, advil, aleve etc   - Patient verbalized understanding instructions     Teri Franklin, PharmD  IBD Clinical Pharmacist

## 2024-11-18 NOTE — Clinical Note
Pred 40 mg/d started 11/11/24.  Plan per 11/18/24 OV: continue Pred 40 mg/day for another week.   Next RN check 11/25/24

## 2024-11-19 ENCOUNTER — LAB VISIT (OUTPATIENT)
Dept: LAB | Facility: HOSPITAL | Age: 24
End: 2024-11-19
Attending: INTERNAL MEDICINE
Payer: COMMERCIAL

## 2024-11-19 DIAGNOSIS — K50.818 CROHN'S DISEASE OF BOTH SMALL AND LARGE INTESTINE WITH OTHER COMPLICATION: ICD-10-CM

## 2024-11-19 LAB
GAMMA INTERFERON BACKGROUND BLD IA-ACNC: 0.04 IU/ML
M TB IFN-G CD4+ BCKGRND COR BLD-ACNC: 0.01 IU/ML
M TB IFN-G CD4+ BCKGRND COR BLD-ACNC: 0.01 IU/ML
MITOGEN IGNF BCKGRD COR BLD-ACNC: 9.96 IU/ML
TB GOLD PLUS: NEGATIVE

## 2024-11-19 PROCEDURE — 36415 COLL VENOUS BLD VENIPUNCTURE: CPT | Mod: PO | Performed by: INTERNAL MEDICINE

## 2024-11-20 ENCOUNTER — PATIENT MESSAGE (OUTPATIENT)
Dept: GASTROENTEROLOGY | Facility: CLINIC | Age: 24
End: 2024-11-20
Payer: COMMERCIAL

## 2024-11-20 LAB
CMV DNA SPEC QL NAA+PROBE: NORMAL
CYTOMEGALOVIRUS PCR, QUANT: NOT DETECTED IU/ML

## 2024-11-25 ENCOUNTER — TELEPHONE (OUTPATIENT)
Dept: GASTROENTEROLOGY | Facility: CLINIC | Age: 24
End: 2024-11-25
Payer: COMMERCIAL

## 2024-11-25 NOTE — TELEPHONE ENCOUNTER
"Per PM:  - CD, ileo-transverse anastomosis gastroduodenal, ileum, pancolonic S/P ileocolonic resection due to stricture     IBD meds:  - Skyrizi approval pending, new insurance 24  - Entcecile q28d, LD: 10/30/24, ND: dc'd     Prednisone 40 mg/d (40mg started , 40mg/d )    - Nausea: at times  - BM/d: 1-2, loose to formed  - blood: at times, mixed in stool and on TP  - mucous: yes  - Pain:     - Location: upper abdomen    - Ratin/10    - Pain description: cramping    - Aggravating factors: no triggers known    - Alleviating factors: none  - Denies: fever, emesis, noc BM, and false BR trips  - Overall feels: "I feel about the same. I felt better the first few days after starting the prednisone but then my ulcers started coming back and my stomach started hurting more often. I think the prednisone has stopped everything from getting worse but I dont think it has made anything significantly better."   - OV: 25    Dr. Montgomery will be updated.    24 OV: repeat stool calprotectin TBD (3 months after starting Skyrizi versus when prednisone tapered off- whichever is first)     "

## 2024-11-25 NOTE — TELEPHONE ENCOUNTER
----- Message from BHAVYA Barrett sent at 11/18/2024  4:24 PM CST -----  - CD, ileo-transverse anastomosis gastroduodenal, ileum, pancolonic S/P ileocolonic resection due to stricture    IBD Meds:  - Entyvio q28d, LD: 10/30/24, ND: 11/27/24  - Skyrizi: approval pending    Prednisone 40 mg/d (40mg started 11/11, 40mg/d 11/18)    - repeat stool calprotectin TBD (3 months after starting Skyrizi versus when prednisone tapered off- whichever is first)     - OV: 1/23/25

## 2024-12-02 ENCOUNTER — TELEPHONE (OUTPATIENT)
Dept: GASTROENTEROLOGY | Facility: CLINIC | Age: 24
End: 2024-12-02
Payer: COMMERCIAL

## 2024-12-02 NOTE — TELEPHONE ENCOUNTER
"Per PM:  - CD, ileo-transverse anastomosis gastroduodenal, ileum, pancolonic S/P ileocolonic resection due to stricture     IBD Meds:  - Skyrizi: approval pending - currently attempting to gain access to new insurance cards     Prednisone 40 mg/d (40mg started 11/11, 40mg/d 11/18, 40mg/d 11/25)    - BM/d: 1-2 loose-formed  - blood: occ mixed in stool  - mucous: occ  - Denies: fever, N/V, noc BM, false BR trips, and pain  - Overall feels: "I would say I feel a good bit better. I dont have ulcers at the moment and thats been my main complaint."   - OV: 1/23/24    Dr. Montgomery will be updated.    "

## 2024-12-02 NOTE — TELEPHONE ENCOUNTER
Antonio Montgomery MD  You3 minutes ago (1:35 PM)     Please have her decrease to pred 30 mg daily  Check in with her again 12/9  SS

## 2024-12-02 NOTE — TELEPHONE ENCOUNTER
----- Message from BHAVYA Bello sent at 11/25/2024  3:11 PM CST -----  - CD, ileo-transverse anastomosis gastroduodenal, ileum, pancolonic S/P ileocolonic resection due to stricture     IBD Meds:  - Entyvio q28d, LD: 10/30/24, ND: 11/27/24  - Skyrizi: approval pending     Prednisone 40 mg/d (40mg started 11/11, 40mg/d 11/18, 40mg/d 11/25)     - repeat stool calprotectin TBD (3 months after starting Skyrizi versus when prednisone tapered off- whichever is first)      - OV: 1/23/25

## 2024-12-09 ENCOUNTER — TELEPHONE (OUTPATIENT)
Dept: GASTROENTEROLOGY | Facility: CLINIC | Age: 24
End: 2024-12-09
Payer: COMMERCIAL

## 2024-12-09 NOTE — TELEPHONE ENCOUNTER
----- Message from BHAVYA Bello sent at 12/2/2024  1:46 PM CST -----  - CD, ileo-transverse anastomosis gastroduodenal, ileum, pancolonic S/P ileocolonic resection due to stricture      IBD Meds:   - Skyrizi: approval pending (has she gotten insurance cards?)     Prednisone 30 mg/d (40mg started 11/11, 40mg/d 11/18, 40mg/d 11/25, 30mg/d 12/2)      - repeat stool calprotectin TBD (3 months after starting Skyrizi versus when prednisone tapered off- whichever is first)      - OV: 1/23/25

## 2024-12-09 NOTE — TELEPHONE ENCOUNTER
Gailleo Frederick MD  You35 minutes ago (11:06 AM)     Thanks for the update.  I think we can taper her down to 20 mg daily.  Any word on with the Skyrizi will be approved?

## 2024-12-09 NOTE — TELEPHONE ENCOUNTER
"Prednisone taper    Per PM:  - CD, ileo-transverse anastomosis gastroduodenal, ileum, pancolonic S/P ileocolonic resection due to stricture    IBD meds:  - Skyrizi - awaiting insurance cards to start approval    Prednisone 30 mg/d (40mg started 11/11, 40mg/d 11/18, 40mg/d 11/25, 30mg/d 12/2)     - BM/d: 1-2, mostly formed w some loose  - blood: sometimes mixed in stool, sometimes on TP  - mucous: Yes  - Overall feels: "I feel pretty good, not 100% but Im not miserable."  - OV: 1/23/25    Dr. Frederick will be updated in Dr. Montgomery's absence.    "

## 2024-12-16 ENCOUNTER — TELEPHONE (OUTPATIENT)
Dept: GASTROENTEROLOGY | Facility: CLINIC | Age: 24
End: 2024-12-16
Payer: COMMERCIAL

## 2024-12-16 DIAGNOSIS — K50.818 CROHN'S DISEASE OF BOTH SMALL AND LARGE INTESTINE WITH OTHER COMPLICATION: Primary | ICD-10-CM

## 2024-12-16 RX ORDER — DIPHENHYDRAMINE HYDROCHLORIDE 50 MG/ML
50 INJECTION INTRAMUSCULAR; INTRAVENOUS ONCE AS NEEDED
OUTPATIENT
Start: 2024-12-16

## 2024-12-16 RX ORDER — HEPARIN 100 UNIT/ML
500 SYRINGE INTRAVENOUS
OUTPATIENT
Start: 2024-12-16

## 2024-12-16 RX ORDER — EPINEPHRINE 0.3 MG/.3ML
0.3 INJECTION SUBCUTANEOUS ONCE AS NEEDED
OUTPATIENT
Start: 2024-12-16

## 2024-12-16 RX ORDER — RISANKIZUMAB-RZAA 360 MG/2.4
360 WEARABLE INJECTOR SUBCUTANEOUS
Qty: 2.4 ML | Refills: 2 | Status: SHIPPED | OUTPATIENT
Start: 2024-12-16

## 2024-12-16 RX ORDER — METHYLPREDNISOLONE SOD SUCC 125 MG
125 VIAL (EA) INJECTION
OUTPATIENT
Start: 2024-12-16

## 2024-12-16 RX ORDER — SODIUM CHLORIDE 0.9 % (FLUSH) 0.9 %
10 SYRINGE (ML) INJECTION
OUTPATIENT
Start: 2024-12-16

## 2024-12-16 NOTE — TELEPHONE ENCOUNTER
- Patient is a 23 y/o female with Crohn's disease with ileo-transverse anastomosis (gastroduodenal, ileum, pancolonic S/P ileocolonic resection due to stricture).    - Despite dose optimized Entyvio with therapeutic drug level, her 11/11/24 colonoscopy showed moderate to severe inflammation of rectum, rectosigmoid colon, ileocolonic anastomosis and involving entry of the neoterminal ileum.  Entyvio last dose: 10/30/24.  - 10/30/24 calprotectin resulted significantly elevated- 4430   - 10/25/24 CRP elevated- 20.7  - Patient currently on Prednisone taper for induction  - Plan per 11/18/24 Dr Montgomery OV: Start Skyrizi as alternative maintenance treatment.  Patient just gained access to her new insurance information ([employer]).  Will start Skyrizi auth process.    - Skyrizi 600 mg IV week 0, 4, 8 therapy plan sent to [facility] care  - Skyrizi 360 mg SC every 8 week Rx sent to [facility]    - Labs UTD: CBC CMP 10/2024, repeat with IV Skyrizi #3 and then q6 months, HepB neg 9/2024, repeat 9/2025, TB neg 11/2024, repeat 11/2025  - repeat stool calprotectin TBD (3 months after starting Skyrizi versus when prednisone tapered off- whichever is first)   - Next Dr Montgomery OV: 1/23/25

## 2024-12-16 NOTE — TELEPHONE ENCOUNTER
----- Message from BHAVYA Bello sent at 12/9/2024 11:44 AM CST -----  - CD, ileo-transverse anastomosis gastroduodenal, ileum, pancolonic S/P ileocolonic resection due to stricture      IBD Meds:   - Skyrizi: approval pending (has she gotten insurance cards?)      Prednisone 20 mg/d (40mg started 11/11, 40mg/d 11/18, 40mg/d 11/25, 30mg/d 12/2, 20mg/d 12/9)      - repeat stool calprotectin TBD (3 months after starting Skyrizi versus when prednisone tapered off- whichever is first)      - OV: 1/23/25

## 2024-12-16 NOTE — TELEPHONE ENCOUNTER
"Prednisone taper  - CD, ileo-transverse anastomosis gastroduodenal, ileum, pancolonic S/P ileocolonic resection due to stricture    IBD meds:  - Skyrizi - just rec'd insurance cards and Pharmacy team is working on the approval process    Prednisone 20 mg/d (40mg started 11/11, 40mg/d 11/18, 40mg/d 11/25, 30mg/d 12/2, 20mg/d 12/9)     - BM/d: 1-2, loose to formed  - blood: sometimes mixed in stool and/or on TP  - mucous: sometimes  - Overall feels: "I feel about the same. I have ulcers in my mouth again. I dont think anything is getting worse besides my mouth."  - OV: 1/23/25    Dr. Montgomery will be updated.    "

## 2024-12-16 NOTE — TELEPHONE ENCOUNTER
PM sent with Dr. Montgomery's recommendation     Antonio Montgomery MD  You15 minutes ago (4:36 PM)     Decrease prednisone to 15 mg oral daily    SS

## 2024-12-20 DIAGNOSIS — K50.818 CROHN'S DISEASE OF BOTH SMALL AND LARGE INTESTINE WITH OTHER COMPLICATION: ICD-10-CM

## 2024-12-20 RX ORDER — PREDNISONE 10 MG/1
15 TABLET ORAL DAILY
Qty: 22 TABLET | Refills: 0 | Status: SHIPPED | OUTPATIENT
Start: 2024-12-20

## 2024-12-20 NOTE — TELEPHONE ENCOUNTER
Spoke with Harper:  - PM was left unread from 12/16  - she states she decreased the Prednisone to 10mg/d on 12/17 as her supply was running low  - advised she contact us when her supply is running low so we can send a refill in, adjusting on her own can prolong symptoms  - she states she is doing well with no increased sxs since reducing to 10mg/d  - per plan 12/16, she should've decreased Prednisone from 20mg/d to 15mg/d    Dr. Montgomery will be updated.

## 2024-12-20 NOTE — TELEPHONE ENCOUNTER
Spoke with Harper:  - reviewed Dr. Montgomery's recommendations   - she states understanding and agrees with this plan     Antonio Montgomery MD Shah Shamita Staff; [facility] Ibd Pharmacist1 hour ago (3:11 PM)       Okay to stay on pred 10 mg daily for a week then 5 mg daily for a week and then stop and emphasize to let us know if any worsening symptoms at anytime  Copying pharmacy team so they are aware given still approving skyrizi

## 2024-12-31 ENCOUNTER — PATIENT MESSAGE (OUTPATIENT)
Dept: GASTROENTEROLOGY | Facility: CLINIC | Age: 24
End: 2024-12-31
Payer: COMMERCIAL

## 2024-12-31 NOTE — TELEPHONE ENCOUNTER
"Crohn's disease with ileo-transverse anastomosis (gastroduodenal, ileum, pancolonic S/P ileocolonic resection due to stricture)    Currently on 5mg/d prednisone (tapered on 12/26). She started noticing an increase in symptoms. She would like to increase her prednisone.    - BM/d 2-3, loose to watery  - Increased amount of mucus with BMs  - Blood, states it's from hemorrhoids   - Ulcers in mouth worse, 6/10 pain  - Denies fever but having "severe night sweats"    Dr. Montgomery will be notified.     "

## 2025-01-02 ENCOUNTER — TELEPHONE (OUTPATIENT)
Dept: GASTROENTEROLOGY | Facility: CLINIC | Age: 25
End: 2025-01-02
Payer: COMMERCIAL

## 2025-01-02 DIAGNOSIS — K50.818 CROHN'S DISEASE OF BOTH SMALL AND LARGE INTESTINE WITH OTHER COMPLICATION: Primary | ICD-10-CM

## 2025-01-02 NOTE — TELEPHONE ENCOUNTER
Skyrizi IV and SC both have been approved    Confirmed with [facility] first dose of skyrizi IV scheduled for 1/7/25.  Dose #2 on 2/4/25  Dose #3 on 3/4/25  First OBI 4/1/25    At next OV on 1/23/25  - schedule CBC CMP with third dose on 3/4/25  - offer OBI training

## 2025-01-03 ENCOUNTER — LAB VISIT (OUTPATIENT)
Dept: LAB | Facility: HOSPITAL | Age: 25
End: 2025-01-03
Attending: INTERNAL MEDICINE
Payer: COMMERCIAL

## 2025-01-03 DIAGNOSIS — Z79.899 IMMUNODEFICIENCY DUE TO LONG TERM IMMUNOSUPPRESSIVE DRUG THERAPY: ICD-10-CM

## 2025-01-03 DIAGNOSIS — T45.1X5A IMMUNODEFICIENCY DUE TO LONG TERM IMMUNOSUPPRESSIVE DRUG THERAPY: ICD-10-CM

## 2025-01-03 DIAGNOSIS — K50.818 CROHN'S DISEASE OF BOTH SMALL AND LARGE INTESTINE WITH OTHER COMPLICATION: ICD-10-CM

## 2025-01-03 DIAGNOSIS — D84.821 IMMUNODEFICIENCY DUE TO LONG TERM IMMUNOSUPPRESSIVE DRUG THERAPY: ICD-10-CM

## 2025-01-03 PROCEDURE — 83993 ASSAY FOR CALPROTECTIN FECAL: CPT | Performed by: INTERNAL MEDICINE

## 2025-01-06 LAB — CALPROTECTIN STL-MCNT: ABNORMAL MCG/G

## 2025-01-06 RX ORDER — PREDNISONE 10 MG/1
10 TABLET ORAL DAILY
Qty: 26 TABLET | Refills: 0 | Status: SHIPPED | OUTPATIENT
Start: 2025-01-06

## 2025-01-07 ENCOUNTER — TELEPHONE (OUTPATIENT)
Dept: GASTROENTEROLOGY | Facility: CLINIC | Age: 25
End: 2025-01-07
Payer: COMMERCIAL

## 2025-01-07 NOTE — TELEPHONE ENCOUNTER
----- Message from BHAVYA Barrett sent at 12/31/2024  4:03 PM CST -----  Regarding: Prednisone taper  Crohn's disease with ileo-transverse anastomosis (gastroduodenal, ileum, pancolonic S/P ileocolonic resection due to stricture)    12/31: Currently on 5mg/d prednisone (tapered on 12/26). She started noticing an increase in symptoms. She would like to increase her prednisone.    Prednisone 20mg/d (increased from 5mg/d 12/31)

## 2025-01-07 NOTE — TELEPHONE ENCOUNTER
"Prednisone taper    Per PM:  - CD, ileo-transverse anastomosis gastroduodenal, ileum, pancolonic S/P ileocolonic resection due to stricture    IBD meds:  - Skyrizi induction:    - #1: 25 - completed    - #2: 25    - #3: 3/4/25   - First OBI 25    Current Prednisone: 10mg/d  (40mg started , 40mg/d , 40mg/d , 30mg/d , 20mg/d , 10mg/d , 5mg/d , 20mg/d )     - BM/d: 1-3, loose  - blood: mixed and on TP  - mucous: yes  - false BR trips/d: 2-3 in last wk  - Pain: mouth/throat sores    - Location: mouth and throat    - Ratin    - Aggravating factors: swallowing  - Overall feels: "about the same as Ive been feeling. I havent gotten any worse or better."  - OV: 25    Dr. Montgomery will be updated.    "

## 2025-01-07 NOTE — TELEPHONE ENCOUNTER
January 7, 2025  Antonio Montgomery MD to Ulises Alvarez Staff       1/7/25 12:43 PM   Based on stool calprotectin and recent symptoms if she is open to it I would like to increase back to pred 20 mg daily to give skyrizi more time to take effect since there was a delay in starting.  She has appt with me later this month    SS

## 2025-01-07 NOTE — TELEPHONE ENCOUNTER
Spoke with Harper:  - states she reduced the Prednisone to 10mg/d yesterday as her new Rx bottle stated to take 1 tab daily  - reviewed importance of continuing current dose as recommended by Dr. Montgomery despite what the Rx bottle states  - reviewed Dr. Montgomery's recommendation to resume Prednisone 20mg/d to bridge as Skyrizi is started  - RN check in 1 wk  - she states understanding and agrees with this plan

## 2025-01-14 ENCOUNTER — TELEPHONE (OUTPATIENT)
Dept: GASTROENTEROLOGY | Facility: CLINIC | Age: 25
End: 2025-01-14
Payer: COMMERCIAL

## 2025-01-14 NOTE — TELEPHONE ENCOUNTER
----- Message from BHAVYA Bello sent at 1/7/2025 12:58 PM CST -----  - CD, ileo-transverse anastomosis gastroduodenal, ileum, pancolonic S/P ileocolonic resection due to stricture    IBD meds:  - Mark induction:    - #1: 1/7/25 - completed    - #2: 2/4/25    - #3: 3/4/25   - First OBI 4/1/25    Current Prednisone: 20mg/d  (40mg started 11/11, 40mg/d 11/18, 40mg/d 11/25, 30mg/d 12/2, 20mg/d 12/9, 10mg/d 12/16, 5mg/d 12/26, 20mg/d 12/31, 10mg/d 1/6, 20mg/1/7)     - OV: 1/23/25

## 2025-01-14 NOTE — TELEPHONE ENCOUNTER
"Prednisone taper    Per PM:  - CD, ileo-transverse anastomosis gastroduodenal, ileum, pancolonic S/P ileocolonic resection due to stricture     IBD meds:  - Skyrizmedina induction:    - #1: 25 - completed    - #2: 25    - #3: 3/4/25   - First OBI 25     Current Prednisone: 20mg/d  (40mg started , 40mg/d , 40mg/d , 30mg/d , 20mg/d , 10mg/d , 5mg/d , 20mg/d , 10mg/d , 20mg/)     - BM/d: 1-2, some loose but mostly formed  - blood: sometimes mixed in stool  - mucous: yes  - Pain:     - Location: top of stomach    - Ratin/10    - Pain description: "a good bit of cramping"  - Overall feels: "Maybe a little bit better, but not a huge difference. My mouth and throat issues are calming down a bit so thats a plus.   - OV: 25    Dr. Montgomery will be updated.    "

## 2025-01-15 ENCOUNTER — TELEPHONE (OUTPATIENT)
Dept: GASTROENTEROLOGY | Facility: CLINIC | Age: 25
End: 2025-01-15
Payer: COMMERCIAL

## 2025-01-15 DIAGNOSIS — K50.818 CROHN'S DISEASE OF BOTH SMALL AND LARGE INTESTINE WITH OTHER COMPLICATION: ICD-10-CM

## 2025-01-15 RX ORDER — PANTOPRAZOLE SODIUM 40 MG/1
40 TABLET, DELAYED RELEASE ORAL
Qty: 30 TABLET | Refills: 5 | OUTPATIENT
Start: 2025-01-15

## 2025-01-15 NOTE — TELEPHONE ENCOUNTER
- Current therapy: RISANKIZUMAB-RZAA (SKYRIZI) 600MG week 0,4,8  - Last office visit: 11/18/2024  - Labs: CBC/CMP 10/2024, next due 4/2025, TB 11/2024 ,Hep B 9/2024, next due 9/2025  - Next office visit: 2/7/2025  - Allergies reviewed.   - Therapy plan updated.     Signed order forms faxed to infusion center. Successful fax transmittal e-mail received.     Infusion center: [facility]  Fax # [phone]

## 2025-01-21 ENCOUNTER — PATIENT MESSAGE (OUTPATIENT)
Dept: GASTROENTEROLOGY | Facility: CLINIC | Age: 25
End: 2025-01-21
Payer: COMMERCIAL

## 2025-01-23 ENCOUNTER — TELEPHONE (OUTPATIENT)
Dept: GASTROENTEROLOGY | Facility: CLINIC | Age: 25
End: 2025-01-23
Payer: COMMERCIAL

## 2025-01-23 DIAGNOSIS — K50.818 CROHN'S DISEASE OF BOTH SMALL AND LARGE INTESTINE WITH OTHER COMPLICATION: ICD-10-CM

## 2025-01-23 RX ORDER — PREDNISONE 10 MG/1
10 TABLET ORAL DAILY
Qty: 30 TABLET | Refills: 0 | Status: SHIPPED | OUTPATIENT
Start: 2025-01-23

## 2025-01-23 NOTE — TELEPHONE ENCOUNTER
IBD Provider: Dr. Montgomery     - Allergies reviewed  - Rx pended for approval    - OV: 1/25/25

## 2025-01-23 NOTE — TELEPHONE ENCOUNTER
----- Message from BHAVYA Bello sent at 1/14/2025  3:12 PM CST -----  - CD, ileo-transverse anastomosis gastroduodenal, ileum, pancolonic S/P ileocolonic resection due to stricture     IBD meds:  - Mark induction:    - #1: 1/7/25 - completed    - #2: 2/4/25    - #3: 3/4/25   - First OBI 4/1/25     Current Prednisone: 20mg/d  (40mg started 11/11, 40mg/d 11/18, 40mg/d 11/25, 30mg/d 12/2, 20mg/d 12/9, 10mg/d 12/16, 5mg/d 12/26, 20mg/d 12/31, 10mg/d 1/6, 20mg/1/7)      - OV: 1/23/25  Per Dr. Montgomery 1/14/25: decrease prednisone to 15 mg oral daily, scheduled visit on 1/23/25 and we will discuss further tapering at that visit.

## 2025-01-25 ENCOUNTER — PATIENT MESSAGE (OUTPATIENT)
Dept: GASTROENTEROLOGY | Facility: CLINIC | Age: 25
End: 2025-01-25

## 2025-01-25 ENCOUNTER — OFFICE VISIT (OUTPATIENT)
Dept: GASTROENTEROLOGY | Facility: CLINIC | Age: 25
End: 2025-01-25
Payer: COMMERCIAL

## 2025-01-25 DIAGNOSIS — L52 ERYTHEMA NODOSUM: ICD-10-CM

## 2025-01-25 DIAGNOSIS — T45.1X5A IMMUNODEFICIENCY DUE TO LONG TERM IMMUNOSUPPRESSIVE DRUG THERAPY: ICD-10-CM

## 2025-01-25 DIAGNOSIS — D84.821 IMMUNODEFICIENCY DUE TO LONG TERM IMMUNOSUPPRESSIVE DRUG THERAPY: ICD-10-CM

## 2025-01-25 DIAGNOSIS — Z86.69 HISTORY OF EPISCLERITIS: ICD-10-CM

## 2025-01-25 DIAGNOSIS — M07.60 ENTEROPATHIC ARTHROPATHY: ICD-10-CM

## 2025-01-25 DIAGNOSIS — K50.818 CROHN'S DISEASE OF BOTH SMALL AND LARGE INTESTINE WITH OTHER COMPLICATION: Primary | ICD-10-CM

## 2025-01-25 DIAGNOSIS — Z79.899 IMMUNODEFICIENCY DUE TO LONG TERM IMMUNOSUPPRESSIVE DRUG THERAPY: ICD-10-CM

## 2025-01-25 NOTE — PROGRESS NOTES
[facility]             Inflammatory Bowel Disease   Follow-up  Note              TODAY'S VISIT DATE:  1/25/2025    Chief Complaint:   No chief complaint on file.    PCP: No, Primary Doctor    Previous History:  Harper CAMPA is a 24 y.o. female  Crohn's disease with ileo-transverse anastomosis (gastroduodenal, ileum, pancolonic S/P ileocolonic resection due to stricture) and history of EIMS including oral ulcers, episcleritis (6/2016), erythema nodosum (3196-5554), PCOS, osteopenia, history of C diff (1/2024- oral vancomycin). She was doing well until 4/2013 when she had diarrhea, N/V, wt loss, oral ulcers and underwent EGD and colonoscopy with finding c/w Crohn's disease (gastroduodenal, pan-colonic, ileum).  She was initially placed on corticosteroids and remicade from 8831-7020 and was a secondary nonresponder due to developing high abs despite adding 6MP in 2015 (noral TPM 30). In approximately 2014 or 2015 she had erythema nodosum LE and recalls prednisone helped symptoms and she had no recurrence. Even on remicade she feels like there was not a period of deep remission though likely a partial response. She was hospitalized in 2/2016 and underwent EGD significant for endoscopically linear erythema and white plaques in the entire esophagus with bx c/w mild acute esophagitis throughout the esophagus, HP neg moderate chronic active gastritis.  Colonoscopy significant for multiple perianal skin tags and active inflammation throughout the entire colon, ICV and ileum. She was discharged home with steroids (tapered off within 2 mos) and enteral nutrition through [facility]GT. In approximately 2015 she was not compliant with 6MP because difficult to swallow pills due to painful LAD in neck.  She was then started on Humira and continued 6MP 50 mg/d in 3/2016. In 6/2016 she was diagnosed with episcleritis treated with steroid eye drops. In 9/2016 she was optimized to humira 40 mg SC weekly..  In  9/2016 pt started on budesonide 9 mg/d.  By 12/2016 her 6MP was decreased to 25 mg/d due to elevated 6TG 430. In 4/2017 EGD significanat for some focal gastric metaplasia on duodenal biopsies with moderate inflammation rectum, sigmoid, transverse, right colon and mild inflammation left colon. In 1/2017 calprotectin 891 and 2/2017 calprotectin 390. In 2/2017 MRE with diffuse inflammation in cecum and TI.   In 2/2017 humira levels 11.8/Abs neg.  In 3/2017 her 6TG 248/6MMP 2309, humira levels 11.6. In 8/2017 6mP changed from 50 mg/d to alternating 75 mg and 50 mg/d.  In 11/2017 calprotectin >1250 and in 5/2018 1030. In 6/2018 6TG 174, 6MMP 1847 and in 1/2019 6TG 287/6MMP 6350.  8/2018 humira 17.8. Overall she felt humira worked better than remicade and overall seemed to be close to remission.  In 6/2019 EGD normal except for pancreatic rest in the prepyloric area and colonoscopy normal with localized 4 mm inflammatory stricture at the transition zone from ascending to cecum though normal cecal tissue through the stricture.  Unable to evaluate ICV or ileum. In 6/2019 stool calprotectin 179.  MRE 7/2019 significant for circumferential wall thickening with transmural enhancement of the TI and cecum extending to the mid right colon.  Due to stricture she was referred to pediatric surgeon, Dr. Monteiro but surgery was deferred.  In 1/2020 colonoscopy significant for perianal skin tags with pan-colonic inflammation (right colon worse than left colon), previous stricture in proximal ascending improved, ICV stricture with ulceration and friability though unable to pass colonoscopy through stricture with TI inflammation.  In 1/2020 EGD significant for spontaneous antral granularity with oozing and inflammation/erythema of the fundus with normal duodenum. She met Dr. Daniels 2/2020 at which time she had normal BMs with nausea and intermittent vomiting q 2 weeks.  Colonoscopy 3/2020 significant for ulcerated Crohn's disease with  moderate stricture in the ascending colon not traversed and tatoo placed with distal colon with mild erythema without any ulcers and normal sigmoid and rectum. MRE 3/2020 significant for wall thickening of the distal ileum at the ICV with narrowing  with second 2.6 cm ileum with narrowing located 2.4 cm proximal to this area. In 2020 patient underwent laparoscopic right hemicolectomy (resection of TI, ccume, right colon) with surgical path c/w mild chronic active ileitis and colitis with transmural inflammation, focal granuloma, perineural inflammation.  Postoperatively she continued on Humira 40 mg SC weekly and 6MP alternating 50 mg/75 mg/d.  In 2020 EGD normal and colonoscopy significant for mild and patchy apthous ulcers in sigmoid colon with remainder of the colon normal with side to side ileocolonic anastomosis in the transverse colon and anastomosis ulcerated with blind end with ulcer and neoterminal ileum with >5 apthous ulcers c/w Rutgeert's score i2.  Fecal calprotectin 263 and CRP elevated at 1.6.  Humira with 6MP discontinued 2020.  She started stelara 9/10/20. Due to low stelara levels her dose was optimized to 90 mg SC q 4 weeks by 2021.  In 2021 fecal calprotectin 50 and CRP 1.9. She delivered health term baby by  21 and per notes prior to delivery she had slight elevation in her fecal calprotectin.  In 10/2021 EGD significant for LA Grade A esophagitis and colonoscopy significant for large perianal skin tags with side to side ileocolonic anastomosis in the transverse colon that is patent and 2 small ulcers at the ileotransverse anastomosis and normal neoterminal ileum. Dr. Marcum at [facility] referred patient to  2022 at which time she continued on stelara 90 mg SC q 4 weeks though mention of slightly elevated stool calprotectin 181. On 10/7/22 patient received reinduction with stelara IV and then continued on stelara 90 mg SC q 4 weeks.  In 3/2023  stool calprotectin 242 and CRP 21.  In 3/2023 she had wisdom teeth removed and was on intermittent NSAIDs.  On 5/12/23 colonoscopy significant for perianal skin tags, normal ileum with ileocolonic anastomotic ulceration, and erythematous/hemorrhagic/inflammed nodular ulcerated mucosa in rectum and sigmoid colon (bx rectum/sigmoid/descending with mild active inflammation). EGD 5/2023 significant for mild gastric fundus and antrum with inflammation (bx HP neg moderate chronic gastritis, rare non-necrotizing granuloma) and normal duodenum. In 6/21/23 patient started on entyvio and subsequently established care with Dr. Grier at [facility] in 7/2023. Stool calprotectin elevated in 7/2023 (145), 10/2023 (719), 11/2023 (293). I met patient initially 1/2024 when she was in her 2nd trimester of pregnancy and she had continued on entyvio q 8 weeks and reported intermittent upper abdominal cramping, RUQ that woke her up in the middle of the night, 2-3 loose to watery BMS/d with mucous and streaks of blood on toilet paper with perianal skin tags, oral ulcers that had resolved but were active 12/2023.  In 1/2024 C diff neg/PCR pos and took 10 day course of oral vancomycin w./o change in symptoms. She had trouble swallowing and mouth sores with had some benefit with magic mouthwash and colgate peroxyl.  Due to symptoms of loose stool, occ notruanl BMS we proceeded with uceris foam (1/23/24-2/20/24) and we planned to check stool calprotectin after course of uceris foam. On 2/22/24 stool calprotectin 579 and pt started on uceris foam qhs followed by medrol dose guy approximately 2/27/24-3/3/24 and repeat stool calprotectin 3/7/24 690. On 3/11/24 she reported dysphagia due to recurrent oral ulcers and 1-2 loose to formed BMs/d with 1 nocturnal BM once a twice/wk and due to this uceris foam increased to BID for 2 weeks but then she self discontinued due to running out and sx stable.  On 4/11/24 she called due to 2 weeks of  worsening symptoms with 1-2 loose to formed BMs/d with 1-2 nocturnal BMs each week, blood dripping in the toilet, upper abdominal pain, occ nausea and ongoing oral and throat ulcers.  She took 8 week course of entocort 9 mg daily (24-24). On 24 trough VDZ 3.1 on entyvio q 8 weeks. She delivered healthy term baby by   on 24. We then proceeded with stool calprotectin, EGD/colonoscopy and trough VDZ levels to restage her disease after delivery.  Stool calprotectin 2024 151.  On 24 EGD normal and colonoscopy significant for large perianal skin tags with mild to moderate distal rectum inflammation with a localized area of moderate inflammation at 25 cm, one localized area of inflammation characterized by erythema and apthous ulcer in the sigmoid colon. From 25 cm to the ileo-ascending anastomosis normal colon with severe inflammation at the ileoascending anastomosis and normal neoterminal ileum.  On 24 ALT elevated to 60 with 24 serological liver workup and repeat LFTs negative.  2024 MRE showed inflammatory CD without luminal narrowing involving 3 areas, including the terminal ileum, proximal colon, and distal rectum with associated enhancement, mild wall thickening, and diffusion restriction.  This is favored to represent subacute inflammation versus less likely acute inflammation. Abundant cystic lesions in the bilateral ovaries, which be seen in the setting of polycystic ovarian disease.  Hepatomegaly.  On 9/3/24 pt reported  3-5 loose to watery BMs/d with 1-2 with blood mixed in stool.  On 24, stool culture and C diff were negative and calprotectin increased to 3100.  24 Entyvio trough level resulted 18 (therapeutic) on 300 mg IV every 4 weeks.  By 10/7/24, her stool calprotectin decreased to 282 (near normal) without any intervention and patient reported overall stable symptoms around that time therefore it was difficult to know why the calprotectin decreased.   She had ongoing oral ulcers that did not correlate with bowel habits.  In 10/2024 CRP elevated and stool calprotectin higher (4430) correlating with colonoscopy 11/2024 significant for moderate to severe inflammation in the rectum and rectosigmoid colon, mild localized area of very mild inflammation in the sigmoid colon with remainder of the colon normal, moderate to severe inflammation at the ileocolonic anastomsosi and involving entry of the neoterminal ileum with remainder of ileum normal.  The bx of ileocolonic anastomosis with inflammation intramucosal non-necrotizing granulomas, rare CMV immunopositive cells without evidence of viral cytopathic effect, AFB and BMS stains negative with remainder of bx correlating with endoscopic findings and neg for CMV. CMV DNA PCR neg (11/2024).  She then restarted prednisone 40 mg daily and we stopped entyvio with last dose 11/27/24 and started skyrizi 1/7/25.     Interval History:  - current IBD meds: Prednisone 40 mg/d (40 mg started 11/11/24, 40mg/d 11/18, 40mg/d 11/25, 30mg/d 12/2, 20mg/d 12/9, 10mg/d 12/16, 5mg/d 12/26, 20mg/d 12/31, 10mg/d 1/6, 20mg/d 1/7, 15 mg/d 1/14, 10 mg/d 1/23), Skyrizi (started IV on 1/7/25, 2nd dose 2/4/25, 3rd dose 3/4/25, first OBI 4/1/25)  - recent IBD meds:  Entyvio 300 mg IV q 4 weeks (6/2023-10/30/24, 300 mg IV q 4 weeks) - secondary nonresponder postpartum  - 1-2 formed and some loose with blood mixed in stool once every few days (improved) BMs/d, no blood/urgency/nocturnal BMs  - oral ulcers- resolved   - stool calprotectin: 7/1/24 151, 9/4/24 3100, 9/10/24 1180, 10/7/24 282, 10/30/24 4430, 1/3/24 1310  - NSAID use: No  - Narcotic use: No  - Alternative/complementary meds for IBD: No    Prior Pertinent Surgeries:   5/2020 laparoscopic right hemicolectomy (resection of TI, ccume, right colon) with surgical path c/w mild chronic active ileitis and colitis with transmural inflammation, focal granuloma, perineural inflammation    Last  pertinent Endoscopy/Imaging:  3/12/20 MRE: wall thickening of the distal ileum at the level of the ileocecal valve with narrowing of the lumen, with the lumen measuring up to 3 mm. Approximately 2.4 cm proximal to this area is an additional area of circumferential wall thickening measuring approximately 2.6 cm in length with luminal narrowing measuring as narrow as 3 mm. Abnormal motion and lack of peristalsis is noted on the cine images within this area. Restricted diffusion is noted within the bowel wall within this area with minimal perienteric edema   7/30/24 EGD:  normal esophagus, normal stomach, normal duodenum  8/2024 MRE:  Inflammatory CD without luminal narrowing involving 3 areas, including the terminal ileum, proximal colon, and distal rectum with associated enhancement, mild wall thickening, and diffusion restriction.  This is favored to represent subacute inflammation versus less likely acute inflammation. Abundant cystic lesions in the bilateral ovaries, which be seen in the setting of polycystic ovarian disease.  Hepatomegaly.   11/11/24 colonoscopy:  Perianal skin tags. Moderate to severe inflammation of rectum and rectosigmoid colon. Mild localized area of very mild inflammation in the sigmoid colon. Normal descending and transverse colon. Moderate to severe inflammation at the ileocolonic anastomosis and involving entry of the neoterminal ileum. The examined portion of the ileum was normal.  Biopsies christy-terminal ileum with patchy minimally active enteritis. Ileo-colonic anastomosis with severely active chronic enteritis with ulceration, focal minute intramucosal non-necrotizing granuloma, rare CMV immunopositive cells without evidence of viral cytopathic effect.  Transverse and descending colon normal. Sigmoid colon with focal minimally active colitis with Paneth cell metaplasia, suggestive of chronicity.  Rectosigmiod colon with severely active chronic colitis with extensive superficial erosion,  intramucosal non-necrotizing granulomas, rare CMV immunopositive cells without evidence of viral cytopathic effect, AFB and BMS stains negative.  Rectum with mild-to-moderately active chronic proctitis with focal superficial erosion, CMV negative.    Therapeutic Drug Monitoring Labs:  12/2016 6TG 430  2/2017 humira levels 11.8/Abs neg  3/2017 her 6TG 248/6MMP 2309, humira levels 11.6  6/2018 6TG 174, 6MMP 1847   1/2019 6TG 287/6MMP 6350  8/2018 humira 17.8  5/9/24 trough VDZ 3.1  9/4/24- trough VDZ 18 (300 mg IV every 4 weeks)    Prior IBD Therapies:  Entocort- 2016, repeat course 4/2024-6/2024  6MP 1468-7440 (12/2016 25 mg/d, 8/2017 alternating 50 mg and 75 mg/d, stopped 9/2020)  Remicade 6363-6124- secondary nonresponder due to antibodies  Humira 40 mg SC weekly (3/2016-9/2020, weekly 9/2016)- secondary nonresponder  Stelara 90 mg SC q 4 weeks (started 9/10/20, 90 mg SC q 4 weeks Feb/March 2021)- effective and possibly had inflammation on scope due to NSAIDs  uceris foam BID (started 1/23/24-2/20/24, 3/11-  Entyvio 300 mg IV q 4 weeks (started 6/2023, 300 mg IV q 4 weeks 7/1/24, 10/30/24) - secondary nonresponder  Medrol dose guy 2/27/24-3/3/24- effective     Vaccinations:  Lab Results   Component Value Date    HEPBSURFABQU POSITIVE 01/09/2024    HEPBSURFABQU 68 01/09/2024     Lab Results   Component Value Date    HEPAIGG Non-reactive 01/09/2024     Lab Results   Component Value Date    VARICELLAZOS 77.38 01/09/2024    VARICELLAINT Negative 01/09/2024     Lab Results   Component Value Date    MUMPSIGGSCRE 63.00 01/09/2024    MUMPSIGGINTE Positive 01/09/2024      Lab Results   Component Value Date    RUBEOLAIGGAN >300.00 01/09/2024    RUBEOLAINTER Positive 01/09/2024     Immunization History   Administered Date(s) Administered    DTaP 2000, 01/05/2001, 03/08/2001, 10/04/2001, 05/04/2005    HIB 2000, 01/05/2001, 03/18/2001, 10/04/2001    Hepatitis A, Adult 10/25/2024    Hepatitis B (recombinant)  Adjuvanted, 2 dose 08/04/2020, 10/20/2020    Hepatitis B, Pediatric/Adolescent 2000, 2000, 03/08/2001    HiB PRP-T 03/08/2001    IPV 2000, 01/05/2001, 10/04/2001, 05/04/2005    MMR 10/04/2001, 05/04/2005    Meningococcal Conjugate (MCV4O) 1 Vial Dose(10yr-55yr) 01/10/2012    Meningococcal Conjugate (MCV4P) 08/09/2018    Pneumococcal Conjugate - 13 Valent 02/26/2020    Pneumococcal Conjugate - 7 Valent 03/08/2001    Pneumococcal Polysaccharide - 23 Valent 08/04/2020    Tdap 01/10/2012, 04/12/2022    Varicella 10/04/2001, 08/19/2008   Flu shot: recommended yearly, declines   COVID vaccine/booster:  per CDC recommendations  RSV: after age 49 yo  Tetanus (Tdap):  4/2032  PCV 20: 8/2025  HPV: not sure, NA     Hepatitis A: Dose #2 due between 4/25/25-10/25/25  Shingrix: had not had chickenpox as a child, low risk, declined    Review of Systems   Constitutional:  Negative for chills, fever and weight loss.   HENT:          No oral ulcers, dysphagia, oral thrush   Eyes:  Negative for blurred vision, pain and redness.   Respiratory:  Negative for cough and shortness of breath.    Cardiovascular:  Negative for chest pain.   Gastrointestinal:  Positive for abdominal pain. Negative for heartburn, nausea and vomiting.   Genitourinary:  Negative for dysuria and hematuria.   Musculoskeletal:  Negative for back pain and joint pain.   Skin:  Negative for rash.   Psychiatric/Behavioral:  Negative for depression. The patient is not nervous/anxious and does not have insomnia.      All Medical History/Surgical History/Family History/Social History/Allergies have been reviewed and updated in EMR    Outpatient Medications Marked as Taking for the 1/25/25 encounter (Office Visit) with Antonio Montgomery MD   Medication Sig Dispense Refill    buPROPion (WELLBUTRIN XL) 300 MG 24 hr tablet Take 300 mg by mouth once daily.      ergocalciferol (ERGOCALCIFEROL) 50,000 unit Cap TAKE 1 CAPSULE BY MOUTH ONE TIME PER WEEK 4 capsule  5    hydrocortisone 2.5 % cream Apply topically.      ketoconazole (NIZORAL) 2 % cream Apply 1 application  topically 2 (two) times daily.      predniSONE (DELTASONE) 10 MG tablet Take 1 tablet (10 mg total) by mouth once daily. Dose per Dr. Montgomery's instruction. 30 tablet 0    prenatal 21/iron fu/folic acid (PRENATAL COMPLETE ORAL) Take 1 tablet by mouth once daily.      risankizumab-rzaa (SKYRIZI) 360 mg/2.4 mL (150 mg/mL) Injt Inject 360 mg into the skin every 8 weeks. 2.4 mL 2     Vital Signs:  There were no vitals taken for this visit.   Physical Exam    Labs:   Lab Results   Component Value Date    CRP 20.7 (H) 10/25/2024    CALPROTECTIN 1,310.0 (H) 01/03/2025     Lab Results   Component Value Date    HEPBSAG Non-reactive 09/04/2024    HEPBCAB Non-reactive 09/04/2024     Lab Results   Component Value Date    TBGOLDPLUS Negative 11/18/2024     Lab Results   Component Value Date    YEDPCQHW17UY 41 03/23/2023    QWQDPBHZ24 731 10/25/2024     Lab Results   Component Value Date    WBC 6.89 10/25/2024    HGB 11.1 (L) 10/25/2024    HCT 36.5 (L) 10/25/2024    MCV 81 (L) 10/25/2024     10/25/2024     Lab Results   Component Value Date    CREATININE 0.7 10/25/2024    ALBUMIN 3.3 (L) 10/25/2024    BILITOT 0.2 10/25/2024    ALKPHOS 93 10/25/2024    AST 18 10/25/2024    ALT 17 10/25/2024     Assessment/Plan:  Harper CAMPA is a 24 y.o. female with Crohn's disease with ileo-transverse anastomosis (gastroduodenal, ileum, pancolonic S/P ileocolonic resection due to stricture) and history of EIMS including oral ulcers, erythema nodosum and episcleritis, history of C diff (1/2024- oral vancomycin).     Patient continues to taper prednisone and since last visit stopped entyvio and started skyrizi 1/2025 with plans to complete induction infusion on 3/3/25 and then start skyrizi OBI every 8 weeks on 4/1/25. Pt has elected to do this at home with the ambassador. She will be off of prednisone first week of 2/2025 and will turn  in repeat stool calprotectin then and if she continues to do well will plan for colonoscopy in 7/2025.     # Crohn's disease with ileo-transverse anastomosis (gastroduodenal, ileum, pancolonic S/P ileocolonic resection due to stricture):  - oral ulcers- resolved  - mild MYAH- continue prenatal, CBC with iron studies 3/2025  - continue pred 10 mg daily and on 1/30 decrease to 5 mg daily and on 2/6 stop prednisone  - proceed with skyrizi infusion #2 2/4, #3 3/3 and then OBI injection 4/1- pt will do first injection with ambassador  - CMV DNA PCR- lab appt scheduled 11/19/24  - stool calprotectin first week of 2/2025  - colonoscopy 6-12 months after starting Skyrizi- 7/2025  - basic labs:  CRP  - drug monitoring labs: CBC/CMP 3/3/24 then every 6 mos, TPMT (normal 1/2024), TB quantiferon (11/2025), Hep B testing (9/2025)    # Elevated ALT- resolved  - repeat LFTs and serological workup with labs 9/2024 negative     # Immunodeficiency due to long term immunosuppressive drug therapy and IBD specific health maintenance:  CRC risk- sx 4/2013, pancolonic, surveillance colonoscopy   Skin exam- yearly- normal 3/2024, outside dermatologist   Risk for osteopenia/osteoporosis- none  Pap smear- yearly- normal 10/2023, defer to OB/GYN  Vitamin D- normal 3/2023, continue high dose vit D weekly-has been on this for years  Vaccines- no live vaccines, hep A # 2 due between 4/25/25-10/25/25, declines flu, shingrix    Visit today is associated with current or anticipated ongoing medical care related to this patient's single serious condition/complex condition Crohn's disease.     Follow up in about 5 months (around 6/25/2025) for virtual Wed AM- early June .    The patient location is: Home  The chief complaint leading to consultation is: Crohn's disease    Visit type: audiovisual    Face to Face time with patient: 16 minutes  30 minutes of total time spent on the encounter, which includes face to face time and non-face to face time  preparing to see the patient (eg, review of tests), Obtaining and/or reviewing separately obtained history, Documenting clinical information in the electronic or other health record, Independently interpreting results (not separately reported) and communicating results to the patient/family/caregiver, or Care coordination (not separately reported).     Each patient to whom he or she provides medical services by telemedicine is:  (1) informed of the relationship between the physician and patient and the respective role of any other health care provider with respect to management of the patient; and (2) notified that he or she may decline to receive medical services by telemedicine and may withdraw from such care at any time.    Antonio Montgomery MD  Department of Gastroenterology  Medical Director, Inflammatory Bowel Disease

## 2025-01-25 NOTE — PATIENT INSTRUCTIONS
- labs 3/3/25 CBC, CMP, CRP   - turn in stool calprotectin first or 2nd week of Feb  - continue pred 10 mg daily and on 1/30 decrease to 5 mg daily and on 2/6 stop prednisone  - proceed with skyrizi infusion #2 2/4, #3 3/3 and then OBI injection 4/1

## 2025-02-06 ENCOUNTER — TELEPHONE (OUTPATIENT)
Dept: GASTROENTEROLOGY | Facility: CLINIC | Age: 25
End: 2025-02-06
Payer: COMMERCIAL

## 2025-02-12 ENCOUNTER — LAB VISIT (OUTPATIENT)
Dept: LAB | Facility: HOSPITAL | Age: 25
End: 2025-02-12
Attending: INTERNAL MEDICINE
Payer: COMMERCIAL

## 2025-02-12 DIAGNOSIS — D84.821 IMMUNODEFICIENCY DUE TO LONG TERM IMMUNOSUPPRESSIVE DRUG THERAPY: ICD-10-CM

## 2025-02-12 DIAGNOSIS — T45.1X5A IMMUNODEFICIENCY DUE TO LONG TERM IMMUNOSUPPRESSIVE DRUG THERAPY: ICD-10-CM

## 2025-02-12 DIAGNOSIS — Z79.899 IMMUNODEFICIENCY DUE TO LONG TERM IMMUNOSUPPRESSIVE DRUG THERAPY: ICD-10-CM

## 2025-02-12 DIAGNOSIS — K50.818 CROHN'S DISEASE OF BOTH SMALL AND LARGE INTESTINE WITH OTHER COMPLICATION: ICD-10-CM

## 2025-02-12 PROCEDURE — 83993 ASSAY FOR CALPROTECTIN FECAL: CPT | Performed by: INTERNAL MEDICINE

## 2025-02-17 ENCOUNTER — RESULTS FOLLOW-UP (OUTPATIENT)
Dept: GASTROENTEROLOGY | Facility: CLINIC | Age: 25
End: 2025-02-17

## 2025-02-17 ENCOUNTER — PATIENT MESSAGE (OUTPATIENT)
Dept: GASTROENTEROLOGY | Facility: CLINIC | Age: 25
End: 2025-02-17
Payer: COMMERCIAL

## 2025-02-17 LAB
CALPROTECTIN INTERPRETATION (OHS): ABNORMAL
CALPROTECTIN: 456

## 2025-02-19 ENCOUNTER — PATIENT MESSAGE (OUTPATIENT)
Dept: GASTROENTEROLOGY | Facility: CLINIC | Age: 25
End: 2025-02-19
Payer: COMMERCIAL

## 2025-02-27 ENCOUNTER — TELEPHONE (OUTPATIENT)
Dept: GASTROENTEROLOGY | Facility: CLINIC | Age: 25
End: 2025-02-27
Payer: COMMERCIAL

## 2025-02-27 DIAGNOSIS — K50.818 CROHN'S DISEASE OF BOTH SMALL AND LARGE INTESTINE WITH OTHER COMPLICATION: Primary | ICD-10-CM

## 2025-02-27 NOTE — TELEPHONE ENCOUNTER
----- Message from BHAVYA Barrett sent at 1/25/2025 10:10 AM CST -----  Regarding: Labs Due in March  Labs due 3/3/25 -  CBC, CMP, CRP

## 2025-03-17 ENCOUNTER — LAB VISIT (OUTPATIENT)
Dept: LAB | Facility: HOSPITAL | Age: 25
End: 2025-03-17
Attending: INTERNAL MEDICINE
Payer: COMMERCIAL

## 2025-03-17 DIAGNOSIS — D84.821 IMMUNODEFICIENCY DUE TO LONG TERM IMMUNOSUPPRESSIVE DRUG THERAPY: ICD-10-CM

## 2025-03-17 DIAGNOSIS — T45.1X5A IMMUNODEFICIENCY DUE TO LONG TERM IMMUNOSUPPRESSIVE DRUG THERAPY: ICD-10-CM

## 2025-03-17 DIAGNOSIS — Z79.899 IMMUNODEFICIENCY DUE TO LONG TERM IMMUNOSUPPRESSIVE DRUG THERAPY: ICD-10-CM

## 2025-03-17 DIAGNOSIS — R23.3 EASY BRUISING: ICD-10-CM

## 2025-03-17 DIAGNOSIS — K50.818 CROHN'S DISEASE OF BOTH SMALL AND LARGE INTESTINE WITH OTHER COMPLICATION: ICD-10-CM

## 2025-03-17 LAB
ALBUMIN SERPL BCP-MCNC: 3.2 G/DL (ref 3.5–5.2)
ALP SERPL-CCNC: 83 U/L (ref 40–150)
ALT SERPL W/O P-5'-P-CCNC: 10 U/L (ref 10–44)
ANION GAP SERPL CALC-SCNC: 12 MMOL/L (ref 8–16)
AST SERPL-CCNC: 14 U/L (ref 10–40)
BASOPHILS # BLD AUTO: 0.05 K/UL (ref 0–0.2)
BASOPHILS NFR BLD: 0.7 % (ref 0–1.9)
BILIRUB SERPL-MCNC: 0.2 MG/DL (ref 0.1–1)
BUN SERPL-MCNC: 6 MG/DL (ref 6–20)
CALCIUM SERPL-MCNC: 8.8 MG/DL (ref 8.7–10.5)
CHLORIDE SERPL-SCNC: 105 MMOL/L (ref 95–110)
CO2 SERPL-SCNC: 24 MMOL/L (ref 23–29)
CREAT SERPL-MCNC: 0.6 MG/DL (ref 0.5–1.4)
CRP SERPL-MCNC: 16.9 MG/L (ref 0–8.2)
DIFFERENTIAL METHOD BLD: ABNORMAL
EOSINOPHIL # BLD AUTO: 0.1 K/UL (ref 0–0.5)
EOSINOPHIL NFR BLD: 1.6 % (ref 0–8)
ERYTHROCYTE [DISTWIDTH] IN BLOOD BY AUTOMATED COUNT: 15.2 % (ref 11.5–14.5)
EST. GFR  (NO RACE VARIABLE): >60 ML/MIN/1.73 M^2
GLUCOSE SERPL-MCNC: 76 MG/DL (ref 70–110)
HCT VFR BLD AUTO: 37 % (ref 37–48.5)
HGB BLD-MCNC: 11.1 G/DL (ref 12–16)
IMM GRANULOCYTES # BLD AUTO: 0.01 K/UL (ref 0–0.04)
IMM GRANULOCYTES NFR BLD AUTO: 0.1 % (ref 0–0.5)
LYMPHOCYTES # BLD AUTO: 2.2 K/UL (ref 1–4.8)
LYMPHOCYTES NFR BLD: 32 % (ref 18–48)
MCH RBC QN AUTO: 24.6 PG (ref 27–31)
MCHC RBC AUTO-ENTMCNC: 30 G/DL (ref 32–36)
MCV RBC AUTO: 82 FL (ref 82–98)
MONOCYTES # BLD AUTO: 0.7 K/UL (ref 0.3–1)
MONOCYTES NFR BLD: 10.6 % (ref 4–15)
NEUTROPHILS # BLD AUTO: 3.7 K/UL (ref 1.8–7.7)
NEUTROPHILS NFR BLD: 55 % (ref 38–73)
NRBC BLD-RTO: 0 /100 WBC
PLATELET # BLD AUTO: 422 K/UL (ref 150–450)
PMV BLD AUTO: 10 FL (ref 9.2–12.9)
POTASSIUM SERPL-SCNC: 3.5 MMOL/L (ref 3.5–5.1)
PROT SERPL-MCNC: 7.1 G/DL (ref 6–8.4)
RBC # BLD AUTO: 4.51 M/UL (ref 4–5.4)
SODIUM SERPL-SCNC: 141 MMOL/L (ref 136–145)
WBC # BLD AUTO: 6.81 K/UL (ref 3.9–12.7)

## 2025-03-17 PROCEDURE — 80053 COMPREHEN METABOLIC PANEL: CPT | Performed by: INTERNAL MEDICINE

## 2025-03-17 PROCEDURE — 85025 COMPLETE CBC W/AUTO DIFF WBC: CPT | Performed by: INTERNAL MEDICINE

## 2025-03-17 PROCEDURE — 86140 C-REACTIVE PROTEIN: CPT | Performed by: INTERNAL MEDICINE

## 2025-03-17 PROCEDURE — 36415 COLL VENOUS BLD VENIPUNCTURE: CPT | Mod: PO | Performed by: INTERNAL MEDICINE

## 2025-03-19 ENCOUNTER — PATIENT MESSAGE (OUTPATIENT)
Dept: GASTROENTEROLOGY | Facility: CLINIC | Age: 25
End: 2025-03-19
Payer: COMMERCIAL

## 2025-03-19 NOTE — TELEPHONE ENCOUNTER
Contacted [employer] for statu on Skyrizi OBI- pa on file authorized through 12/2025. Spoke with Isaiah.    Isiaah states claim for Skyrizi was ran today, but received rejection for PA required. Isaiah states under claim rejection for PA required it says for pharmacy to reach out to the help desk. Call placed on hold while rep reach out to help desk.     [facility] on [facility]. Isaiah confirms the pharmacy has record of the PA approval, but still can not override rejection for fill. The [facility] provider office must contact [phone].    Called phone # provided by [employer]. Connected with [email or web address]  Spoke with PA rep Harris.     Kimberly says pt opt into new plan 1/2025. The current PA is only honored under pt prior benefits plan. New PA required under new PBM.       Will initiate PA under updated coverage.

## 2025-03-20 ENCOUNTER — TELEPHONE (OUTPATIENT)
Dept: GASTROENTEROLOGY | Facility: CLINIC | Age: 25
End: 2025-03-20
Payer: COMMERCIAL

## 2025-03-20 NOTE — TELEPHONE ENCOUNTER
PA APPROVED (Skyrizi 360MG/2.4ML)   (Key: [id number])   Authorized : 3/20/2025- 3/20/2026        Awaiting approval letter via fax.

## 2025-03-20 NOTE — TELEPHONE ENCOUNTER
PA SUBMITTED  (Skyrizi 360MG/2.4ML)   (Key: [id number])          Will follow up on determination.

## 2025-04-10 ENCOUNTER — LAB VISIT (OUTPATIENT)
Dept: LAB | Facility: HOSPITAL | Age: 25
End: 2025-04-10
Payer: COMMERCIAL

## 2025-04-10 DIAGNOSIS — Z79.60 IMMUNODEFICIENCY DUE TO LONG TERM IMMUNOSUPPRESSIVE DRUG THERAPY: ICD-10-CM

## 2025-04-10 DIAGNOSIS — T45.1X5A IMMUNODEFICIENCY DUE TO LONG TERM IMMUNOSUPPRESSIVE DRUG THERAPY: ICD-10-CM

## 2025-04-10 DIAGNOSIS — K50.818 CROHN'S DISEASE OF BOTH SMALL AND LARGE INTESTINE WITH OTHER COMPLICATION: ICD-10-CM

## 2025-04-10 DIAGNOSIS — D84.821 IMMUNODEFICIENCY DUE TO LONG TERM IMMUNOSUPPRESSIVE DRUG THERAPY: ICD-10-CM

## 2025-04-10 PROCEDURE — 83993 ASSAY FOR CALPROTECTIN FECAL: CPT

## 2025-04-11 LAB
CALPROTECTIN INTERP (OHS): ABNORMAL
CALPROTECTIN STOOL (OHS): 334

## 2025-06-16 ENCOUNTER — PATIENT MESSAGE (OUTPATIENT)
Dept: GASTROENTEROLOGY | Facility: CLINIC | Age: 25
End: 2025-06-16

## 2025-06-16 ENCOUNTER — TELEPHONE (OUTPATIENT)
Dept: GASTROENTEROLOGY | Facility: CLINIC | Age: 25
End: 2025-06-16
Payer: COMMERCIAL

## 2025-06-16 ENCOUNTER — OFFICE VISIT (OUTPATIENT)
Dept: GASTROENTEROLOGY | Facility: CLINIC | Age: 25
End: 2025-06-16
Payer: COMMERCIAL

## 2025-06-16 VITALS
WEIGHT: 170.19 LBS | DIASTOLIC BLOOD PRESSURE: 77 MMHG | BODY MASS INDEX: 26.71 KG/M2 | HEART RATE: 93 BPM | SYSTOLIC BLOOD PRESSURE: 116 MMHG | HEIGHT: 67 IN | TEMPERATURE: 98 F

## 2025-06-16 DIAGNOSIS — Z86.69 HISTORY OF EPISCLERITIS: ICD-10-CM

## 2025-06-16 DIAGNOSIS — Z79.60 IMMUNODEFICIENCY DUE TO LONG TERM IMMUNOSUPPRESSIVE DRUG THERAPY: ICD-10-CM

## 2025-06-16 DIAGNOSIS — T45.1X5A IMMUNODEFICIENCY DUE TO LONG TERM IMMUNOSUPPRESSIVE DRUG THERAPY: ICD-10-CM

## 2025-06-16 DIAGNOSIS — L52 ERYTHEMA NODOSUM: ICD-10-CM

## 2025-06-16 DIAGNOSIS — R11.0 NAUSEA: Primary | ICD-10-CM

## 2025-06-16 DIAGNOSIS — M07.60 ENTEROPATHIC ARTHROPATHY: ICD-10-CM

## 2025-06-16 DIAGNOSIS — D84.821 IMMUNODEFICIENCY DUE TO LONG TERM IMMUNOSUPPRESSIVE DRUG THERAPY: ICD-10-CM

## 2025-06-16 DIAGNOSIS — D64.9 ANEMIA, UNSPECIFIED TYPE: ICD-10-CM

## 2025-06-16 DIAGNOSIS — K50.818 CROHN'S DISEASE OF BOTH SMALL AND LARGE INTESTINE WITH OTHER COMPLICATION: ICD-10-CM

## 2025-06-16 DIAGNOSIS — K50.818 CROHN'S DISEASE OF BOTH SMALL AND LARGE INTESTINE WITH OTHER COMPLICATION: Primary | ICD-10-CM

## 2025-06-16 PROCEDURE — 3078F DIAST BP <80 MM HG: CPT | Mod: CPTII,S$GLB,, | Performed by: INTERNAL MEDICINE

## 2025-06-16 PROCEDURE — 3074F SYST BP LT 130 MM HG: CPT | Mod: CPTII,S$GLB,, | Performed by: INTERNAL MEDICINE

## 2025-06-16 PROCEDURE — G2211 COMPLEX E/M VISIT ADD ON: HCPCS | Mod: S$GLB,,, | Performed by: INTERNAL MEDICINE

## 2025-06-16 PROCEDURE — 1159F MED LIST DOCD IN RCRD: CPT | Mod: CPTII,S$GLB,, | Performed by: INTERNAL MEDICINE

## 2025-06-16 PROCEDURE — 99999 PR PBB SHADOW E&M-EST. PATIENT-LVL III: CPT | Mod: PBBFAC,,, | Performed by: INTERNAL MEDICINE

## 2025-06-16 PROCEDURE — 3008F BODY MASS INDEX DOCD: CPT | Mod: CPTII,S$GLB,, | Performed by: INTERNAL MEDICINE

## 2025-06-16 PROCEDURE — 99215 OFFICE O/P EST HI 40 MIN: CPT | Mod: S$GLB,,, | Performed by: INTERNAL MEDICINE

## 2025-06-16 RX ORDER — ONDANSETRON 4 MG/1
4 TABLET, ORALLY DISINTEGRATING ORAL EVERY 8 HOURS PRN
Qty: 30 TABLET | Refills: 0 | Status: SHIPPED | OUTPATIENT
Start: 2025-06-16

## 2025-06-16 NOTE — PROGRESS NOTES
[facility]             Inflammatory Bowel Disease   Follow-up Note              TODAY'S VISIT DATE:  6/16/2025    Chief Complaint:   Chief Complaint   Patient presents with    Crohn's Disease     PCP: Nicki, Primary Doctor    Previous History:  Harper CAMPA is a 24 y.o. female  Crohn's disease with ileo-transverse anastomosis (gastroduodenal, ileum, pancolonic S/P ileocolonic resection due to stricture) and history of EIMS including oral ulcers, episcleritis (6/2016), erythema nodosum (2384-9274). She was doing well until 4/2013 when she had diarrhea, N/V, wt loss, oral ulcers and underwent EGD and colonoscopy with finding c/w Crohn's disease (gastroduodenal, pan-colonic, ileum).  She was initially placed on corticosteroids and remicade from 5704-8304 and was a secondary nonresponder due to developing high abs despite adding 6MP in 2015 (noral TPM 30). In approximately 2014 or 2015 she had erythema nodosum LE and recalls prednisone helped symptoms and she had no recurrence. Even on remicade she feels like there was not a period of deep remission though likely a partial response. She was hospitalized in 2/2016 and underwent EGD significant for endoscopically linear erythema and white plaques in the entire esophagus with bx c/w mild acute esophagitis throughout the esophagus, HP neg moderate chronic active gastritis.  Colonoscopy significant for multiple perianal skin tags and active inflammation throughout the entire colon, ICV and ileum. She was discharged home with steroids (tapered off within 2 mos) and enteral nutrition through NGT. In approximately 2015 she was not compliant with 6MP because difficult to swallow pills due to painful LAD in neck.  She was then started on Humira and continued 6MP 50 mg/d in 3/2016. In 6/2016 she was diagnosed with episcleritis treated with steroid eye drops. In 9/2016 she was optimized to humira 40 mg SC weekly..  In 9/2016 pt started on budesonide 9  mg/d.  By 12/2016 her 6MP was decreased to 25 mg/d due to elevated 6TG 430. In 4/2017 EGD significanat for some focal gastric metaplasia on duodenal biopsies with moderate inflammation rectum, sigmoid, transverse, right colon and mild inflammation left colon. In 1/2017 calprotectin 891 and 2/2017 calprotectin 390. In 2/2017 MRE with diffuse inflammation in cecum and TI.   In 2/2017 humira levels 11.8/Abs neg.  In 3/2017 her 6TG 248/6MMP 2309, humira levels 11.6. In 8/2017 6mP changed from 50 mg/d to alternating 75 mg and 50 mg/d.  In 11/2017 calprotectin >1250 and in 5/2018 1030. In 6/2018 6TG 174, 6MMP 1847 and in 1/2019 6TG 287/6MMP 6350.  8/2018 humira 17.8. Overall she felt humira worked better than remicade and overall seemed to be close to remission.  In 6/2019 EGD normal except for pancreatic rest in the prepyloric area and colonoscopy normal with localized 4 mm inflammatory stricture at the transition zone from ascending to cecum though normal cecal tissue through the stricture.  Unable to evaluate ICV or ileum. In 6/2019 stool calprotectin 179.  MRE 7/2019 significant for circumferential wall thickening with transmural enhancement of the TI and cecum extending to the mid right colon.  Due to stricture she was referred to pediatric surgeon, Dr. Monteiro but surgery was deferred.  In 1/2020 colonoscopy significant for perianal skin tags with pan-colonic inflammation (right colon worse than left colon), previous stricture in proximal ascending improved, ICV stricture with ulceration and friability though unable to pass colonoscopy through stricture with TI inflammation.  In 1/2020 EGD significant for spontaneous antral granularity with oozing and inflammation/erythema of the fundus with normal duodenum. She met Dr. Daniels 2/2020 at which time she had normal BMs with nausea and intermittent vomiting q 2 weeks.  Colonoscopy 3/2020 significant for ulcerated Crohn's disease with moderate stricture in the ascending  colon not traversed and tatoo placed with distal colon with mild erythema without any ulcers and normal sigmoid and rectum. MRE 3/2020 significant for wall thickening of the distal ileum at the ICV with narrowing  with second 2.6 cm ileum with narrowing located 2.4 cm proximal to this area. In 2020 patient underwent laparoscopic right hemicolectomy (resection of TI, ccume, right colon) with surgical path c/w mild chronic active ileitis and colitis with transmural inflammation, focal granuloma, perineural inflammation.  Postoperatively she continued on Humira 40 mg SC weekly and 6MP alternating 50 mg/75 mg/d.  In 2020 EGD normal and colonoscopy significant for mild and patchy apthous ulcers in sigmoid colon with remainder of the colon normal with side to side ileocolonic anastomosis in the transverse colon and anastomosis ulcerated with blind end with ulcer and neoterminal ileum with >5 apthous ulcers c/w Rutgeert's score i2.  Fecal calprotectin 263 and CRP elevated at 1.6.  Humira with 6MP discontinued 2020.  She started stelara 9/10/20. Due to low stelara levels her dose was optimized to 90 mg SC q 4 weeks by 2021.  In 2021 fecal calprotectin 50 and CRP 1.9. She delivered health term baby by  21 and per notes prior to delivery she had slight elevation in her fecal calprotectin.  In 10/2021 EGD significant for LA Grade A esophagitis and colonoscopy significant for large perianal skin tags with side to side ileocolonic anastomosis in the transverse colon that is patent and 2 small ulcers at the ileotransverse anastomosis and normal neoterminal ileum. Dr. Marcum at [facility] referred patient to  2022 at which time she continued on stelara 90 mg SC q 4 weeks though mention of slightly elevated stool calprotectin 181. On 10/7/22 patient received reinduction with stelara IV and then continued on stelara 90 mg SC q 4 weeks.  In 3/2023 stool calprotectin 242 and CRP 21.  In  3/2023 she had wisdom teeth removed and was on intermittent NSAIDs.  On 5/12/23 colonoscopy significant for perianal skin tags, normal ileum with ileocolonic anastomotic ulceration, and erythematous/hemorrhagic/inflammed nodular ulcerated mucosa in rectum and sigmoid colon (bx rectum/sigmoid/descending with mild active inflammation). EGD 5/2023 significant for mild gastric fundus and antrum with inflammation (bx HP neg moderate chronic gastritis, rare non-necrotizing granuloma) and normal duodenum. In 6/21/23 patient started on entyvio and subsequently established care with Dr. Grier at [facility] in 7/2023. Stool calprotectin elevated in 7/2023 (145), 10/2023 (719), 11/2023 (293). I met patient initially 1/2024 when she was in her 2nd trimester of pregnancy and she had continued on entyvio q 8 weeks and reported intermittent upper abdominal cramping, RUQ that woke her up in the middle of the night, 2-3 loose to watery BMS/d with mucous and streaks of blood on toilet paper with perianal skin tags, oral ulcers that had resolved but were active 12/2023.  In 1/2024 C diff neg/PCR pos and took 10 day course of oral vancomycin w./o change in symptoms. She had trouble swallowing and mouth sores with had some benefit with magic mouthwash and colgate peroxyl.  Due to symptoms of loose stool, occ notruanl BMS we proceeded with uceris foam (1/23/24-2/20/24) and we planned to check stool calprotectin after course of uceris foam. On 2/22/24 stool calprotectin 579 and pt started on uceris foam qhs followed by medrol dose guy approximately 2/27/24-3/3/24 and repeat stool calprotectin 3/7/24 690. On 3/11/24 she reported dysphagia due to recurrent oral ulcers and 1-2 loose to formed BMs/d with 1 nocturnal BM once a twice/wk and due to this uceris foam increased to BID for 2 weeks but then she self discontinued due to running out and sx stable.  On 4/11/24 she called due to 2 weeks of worsening symptoms with 1-2 loose to formed  BMs/d with 1-2 nocturnal BMs each week, blood dripping in the toilet, upper abdominal pain, occ nausea and ongoing oral and throat ulcers.  She took 8 week course of entocort 9 mg daily (24-24). On 24 trough VDZ 3.1 on entyvio q 8 weeks. She delivered healthy term baby by   on 24. We then proceeded with stool calprotectin, EGD/colonoscopy and trough VDZ levels to restage her disease after delivery.  Stool calprotectin 2024 151.  On 24 EGD normal and colonoscopy significant for large perianal skin tags with mild to moderate distal rectum inflammation with a localized area of moderate inflammation at 25 cm, one localized area of inflammation characterized by erythema and apthous ulcer in the sigmoid colon. From 25 cm to the ileo-ascending anastomosis normal colon with severe inflammation at the ileoascending anastomosis and normal neoterminal ileum.  On 24 ALT elevated to 60 with 24 serological liver workup and repeat LFTs negative.  2024 MRE showed inflammatory CD without luminal narrowing involving 3 areas, including the terminal ileum, proximal colon, and distal rectum with associated enhancement, mild wall thickening, and diffusion restriction.  This is favored to represent subacute inflammation versus less likely acute inflammation. Abundant cystic lesions in the bilateral ovaries, which be seen in the setting of polycystic ovarian disease.  Hepatomegaly.  On 9/3/24 pt reported  3-5 loose to watery BMs/d with 1-2 with blood mixed in stool.  On 24, stool culture and C diff were negative and calprotectin increased to 3100.  24 Entyvio trough level resulted 18 (therapeutic) on 300 mg IV every 4 weeks.  By 10/7/24, her stool calprotectin decreased to 282 (near normal) without any intervention and patient reported overall stable symptoms around that time therefore it was difficult to know why the calprotectin decreased.  She had ongoing oral ulcers that did not  correlate with bowel habits.  In 10/2024 CRP elevated and stool calprotectin higher (4430) correlating with colonoscopy 11/2024 significant for moderate to severe inflammation in the rectum and rectosigmoid colon, mild localized area of very mild inflammation in the sigmoid colon with remainder of the colon normal, moderate to severe inflammation at the ileocolonic anastomsosi and involving entry of the neoterminal ileum with remainder of ileum normal.  The bx of ileocolonic anastomosis with inflammation intramucosal non-necrotizing granulomas, rare CMV immunopositive cells without evidence of viral cytopathic effect, AFB and BMS stains negative with remainder of bx correlating with endoscopic findings and neg for CMV. CMV DNA PCR neg (11/2024).  She took a course of prednisone 11/11/24 and tapered off 2/6/25).  We discontinued entyvio due to ineffectiveness on 11/27/24 and started skyrizi 1/7/25.    Interval History:  - current IBD meds: Skyrizi 360 mg SC every 8 weeks (started IV on 1/7/25, 2nd IV dose 2/4/25, 3rd IV dose due 3/4/25 but completed 3/6/25 due to Galileo Osborn, first OBI 4/3/25, LD: 5/29, ND 7/24)  - Recent IBD meds: prednisone (40 mg started 11/11/24, 40mg/d 11/18, 40mg/d 11/25, 30mg/d 12/2, 20mg/d 12/9, 10mg/d 12/16, 5mg/d 12/26, 20mg/d 12/31, 10mg/d 1/6, 20mg/d 1/7, 15 mg/d 1/14, 10 mg/d 1/23, 5 mg/d 1/30, stopped 2/6/25)  - 1-2 soft to formed BM/day.  No nocturnal.  Urgency with most BM.  Blood blood mixed in the stool and on toilet paper 2x per week.   - upper abd pain- 2-3 x per month typically at night- lasts for 30 mins to 1 hour and then goes away on its own- not new (ongoing)  - oral ulcers- rare since starting Skyrizi, last episode at the end of 5/2025 (before Skyrizi OBI dose due)- ulcers felt active in throat, lasted 3 days- now inactive  - 2/3/25: CRP 17.6 elevated but improved    - 2/12/25: calprotectin 456 elevated but improved    - 3/17/25: CRP 16.9- stable    - 4/10/25: calprotectin  334 elevated but improved  - heard about Evinature from influencer, completed online questionnaire and Evinature Blue Protocol delivered to home- has not started    - plans to stop breastfeeding in the next few weeks  - stool calprotectin: 24 151, 24 3100, 9/10/24 1180, 10/7/24 282, 10/30/24 4430, 1/3/24 1310, 25 456, 4/10/25 334    Past Medical History[1]    Prior Pertinent Surgeries:   2020 laparoscopic right hemicolectomy (resection of TI, ccume, right colon) with surgical path c/w mild chronic active ileitis and colitis with transmural inflammation, focal granuloma, perineural inflammation     Last pertinent Endoscopy/Imagin24 EGD:  normal esophagus, normal stomach, normal duodenum  2024 MRE:  Inflammatory CD without luminal narrowing involving 3 areas, including the terminal ileum, proximal colon, and distal rectum with associated enhancement, mild wall thickening, and diffusion restriction.  This is favored to represent subacute inflammation versus less likely acute inflammation. Abundant cystic lesions in the bilateral ovaries, which be seen in the setting of polycystic ovarian disease.  Hepatomegaly.   24 colonoscopy:  Perianal skin tags. Moderate to severe inflammation of rectum and rectosigmoid colon. Mild localized area of very mild inflammation in the sigmoid colon. Normal descending and transverse colon. Moderate to severe inflammation at the ileocolonic anastomosis and involving entry of the neoterminal ileum. The examined portion of the ileum was normal.  Biopsies christy-terminal ileum with patchy minimally active enteritis. Ileo-colonic anastomosis with severely active chronic enteritis with ulceration, focal minute intramucosal non-necrotizing granuloma, rare CMV immunopositive cells without evidence of viral cytopathic effect.  Transverse and descending colon normal. Sigmoid colon with focal minimally active colitis with Paneth cell metaplasia, suggestive of chronicity.   Rectosigmiod colon with severely active chronic colitis with extensive superficial erosion, intramucosal non-necrotizing granulomas, rare CMV immunopositive cells without evidence of viral cytopathic effect, AFB and BMS stains negative.  Rectum with mild-to-moderately active chronic proctitis with focal superficial erosion, CMV negative.     Therapeutic Drug Monitoring Labs:  12/2016 6TG 430  2/2017 humira levels 11.8/Abs neg  3/2017 her 6TG 248/6MMP 2309, humira levels 11.6  6/2018 6TG 174, 6MMP 1847   1/2019 6TG 287/6MMP 6350  8/2018 humira 17.8  5/9/24 trough VDZ 3.1  9/4/24- trough VDZ 18 (300 mg IV every 4 weeks)     Prior IBD Therapies:  Entocort- 2016, repeat course 4/2024-6/2024  6MP 5079-4619 (12/2016 25 mg/d, 8/2017 alternating 50 mg and 75 mg/d, stopped 9/2020)  Remicade 9789-6532- secondary nonresponder due to antibodies  Humira 40 mg SC weekly (3/2016-9/2020, weekly 9/2016)- secondary nonresponder  Stelara 90 mg SC q 4 weeks (started 9/10/20, 90 mg SC q 4 weeks Feb/March 2021)- effective and possibly had inflammation on scope due to NSAIDs  uceris foam BID (started 1/23/24-2/20/24, 3/11-  Entyvio 300 mg IV q 4 weeks (started 6/2023, 300 mg IV q 4 weeks 7/1/24, 10/30/24) - secondary nonresponder postpartum  Medrol dose guy 2/27/24-3/3/24- effective     Vaccinations:  Lab Results   Component Value Date    HEPBSURFABQU POSITIVE 01/09/2024    HEPBSURFABQU 68 01/09/2024     Lab Results   Component Value Date    HEPAIGG Non-reactive 01/09/2024     Lab Results   Component Value Date    VARICELLAZOS 77.38 01/09/2024    VARICELLAINT Negative 01/09/2024     Lab Results   Component Value Date    MUMPSIGGSCRE 63.00 01/09/2024    MUMPSIGGINTE Positive 01/09/2024      Lab Results   Component Value Date    RUBEOLAIGGAN >300.00 01/09/2024    RUBEOLAINTER Positive 01/09/2024     Immunization History   Administered Date(s) Administered    DTaP 2000, 01/05/2001, 03/08/2001, 10/04/2001, 05/04/2005    HIB 2000,  "01/05/2001, 03/18/2001, 10/04/2001    Hepatitis A, Adult 10/25/2024    Hepatitis B (recombinant) Adjuvanted, 2 dose 08/04/2020, 10/20/2020    Hepatitis B, Pediatric/Adolescent 2000, 2000, 03/08/2001    HiB PRP-T 03/08/2001    IPV 2000, 01/05/2001, 10/04/2001, 05/04/2005    MMR 10/04/2001, 05/04/2005    Meningococcal Conjugate (MCV4O) 1 Vial Dose(10yr-55yr) 01/10/2012    Meningococcal Conjugate (MCV4P) 08/09/2018    Pneumococcal Conjugate - 13 Valent 02/26/2020    Pneumococcal Conjugate - 7 Valent 03/08/2001    Pneumococcal Polysaccharide - 23 Valent 08/04/2020    Tdap 01/10/2012, 04/12/2022    Varicella 10/04/2001, 08/19/2008   Flu shot: recommended yearly, declines   COVID vaccine/booster:  per CDC recommendations  RSV: after age 59 yo  Tetanus (Tdap):  4/2032  PCV 20: 8/2025  HPV: not sure, NA     Hepatitis A: dose #2 offered today, prefers same day as colonoscopy in 7/2025  Shingrix: had not had chickenpox as a child, low risk, declined    Review of Systems: see pertinent review of systems above    Medications Ordered Prior to Encounter[2]    Physical Examination  /77 (BP Location: Left arm, Patient Position: Sitting)   Pulse 93   Temp 98.1 °F (36.7 °C) (Oral)   Ht 5' 7" (1.702 m)   Wt 77.2 kg (170 lb 3.1 oz)   BMI 26.66 kg/m²    Constitutional: well developed, no cough, no dyspnea, alert, and no acute distress    Head: Normocephalic, no lesions, without obvious abnormality  Eye: Normal external eye, conjunctiva, and lids  Cardiovascular: regular rate and regular rhythm  Respiratory: normal air entry, CTA B  Gastrointestinal: soft, non-distended, normal BS, mild tenderness upon palpation  Psychiatric: appropriate, normal mood    Labs:   Lab Results   Component Value Date    CRP 16.9 (H) 03/17/2025    CALPROTECTIN 334 (H) 04/10/2025     Lab Results   Component Value Date    HEPBSAG Non-reactive 09/04/2024    HEPBCAB Non-reactive 09/04/2024   ,   Lab Results   Component Value Date    " TBGOLDPLUS Negative 11/18/2024     Lab Results   Component Value Date    BFRNSUUK11MG 41 03/23/2023    XGUGHKIE30 731 10/25/2024     Lab Results   Component Value Date    WBC 6.81 03/17/2025    HGB 11.1 (L) 03/17/2025    HCT 37.0 03/17/2025    MCV 82 03/17/2025     03/17/2025     Lab Results   Component Value Date    CREATININE 0.6 03/17/2025    ALBUMIN 3.2 (L) 03/17/2025    BILITOT 0.2 03/17/2025    ALKPHOS 83 03/17/2025    AST 14 03/17/2025    ALT 10 03/17/2025     Assessment/Plan:  Harper CAMPA is a 24 y.o. female with Crohn's disease with ileo-transverse anastomosis (gastroduodenal, ileum, pancolonic S/P ileocolonic resection due to stricture).     Patient is doing well clinically on Skyrizi and has tapered off prednisone as of 2/6/25 without any worsening symptoms.  Patient also shows biochemical improvement on Skyrizi.  Repeat calprotectin in 2/2025 and 4/2025 elevated but improved compared to prior.  Repeat CRP in 2/2025 and 3/2025 also elevated but mildly improved compared to prior.  Plan for colonoscopy around 7/2025 to reassess disease activity 6 months after start of Skyrizi.  Will continue to monitor calprotectin with next repeat in about 2-3 weeks (will also correlate with 7/2025 colonoscopy).  Given patient reported an episode that felt like active throat ulcers in 5/2025 and history of gastroduodenal Crohn's, will do EGD same day as colonoscopy.  Patient reports oral ulcers are rare on Skyrizi and isolated episode in 5/2025 was at the end of Skyrizi dosing interval.  She was advised to keep diary of her symptoms so we can continue to monitor timing of oral ulcer outbreaks in relation to Skyrizi doses moving forward.  Patient recently ordered Evinature Blue protocol after hearing about it online but has not started it yet. We reviewed that there is published literature to support Evinature benefit in IBD but recommend for patient to hold off on starting Evinature until after colonoscopy in  7/2025 so that we can assess Skyrizi effectiveness first.  Patient's colonoscopy results may also influence recommended Evinature protocol.        # Crohn's disease with ileo-transverse anastomosis (gastroduodenal, ileum, pancolonic S/P ileocolonic resection due to stricture):  - oral ulcers- one episode in 5/2025 at the end of Skyrizi dosing interval, no issues since- continue to monitor   - mild MYAH-  CBC with iron studies 9/2025  - continue Skyrizi 360 mg SC every 8 weeks  - hold off on starting Evinature until after colonoscopy   - stool calprotectin in the next 2-3 weeks  - colonoscopy 7/2025- Prep: Miralax + Gatoraid.  Zofran 4 mg 30 mins prior to prep to reduce nausea.   - EGD same day as colonoscopy    - B12: normal 10/2024, stopped B12 supplement in 4/2025 issues remembering.  Recheck B12 with next labs in 9/2025.  If normal, repeat again 1 year later.   - basic labs: CRP 9/2025  - drug monitoring labs: CBC/CMP every 6 months (9/2025), TPMT (normal 1/2024), TB quantiferon (9/2025), Hep B testing (9/2025)     # Elevated ALT- resolved  - repeat LFTs and serological workup with labs 9/2024 negative      # Immunodeficiency due to long term immunosuppressive drug therapy  and IBD specific health maintenance:  CRC risk- RFs-sx 4/2013, pancolonic, surveillance colonoscopy   Skin exam yearly - normal 3/2025, due 3/2026 (external Dermatology)  Risk for osteopenia/osteoporosis- none    Pap smear- yearly- normal 1/2025 (external OBGYN)  Vitamin D- normal 3/2023 while on high dose vit D weekly- stopped D2 in 4/2025 due to issues remembering weekly dose. Start OTC Vitamin D3 2000 IU/day  Lab Results   Component Value Date    [id number] 41 03/23/2023   Vaccines- no live vaccines, declines flu and shingrix. Hep A #2 same day as scope in 7/2025, check HepA immunity with routine labs in 9/2025    I personally examined the patient and discussed above plan in collaboration with Teri Franklin, PharmD.      Visit today is  associated with current or anticipated ongoing medical care related to this patient's single serious condition/complex condition Crohn's disease.     Follow up in about 6 months (around 12/16/2025) for In-Person.    Antonio Montgomery MD, FACG, [facility]  Department of Gastroenterology  Medical Director, Inflammatory Bowel Disease                    [1]   Past Medical History:  Diagnosis Date    Crohn's disease with ileo-transverse anastomosis (gastroduodenal, pancolonic, ileum S/P ileal/right colon resection due to stricture)     History of Clostridioides difficile infection     1/2024- oral vancomycin    History of episcleritis 01/12/2024    History of Erythema nodosum     Hypertension     Immunodeficiency due to long term immunosuppressive drug therapy     Oral ulcer     Osteopenia     PCOS (polycystic ovarian syndrome)    [2]   Current Outpatient Medications on File Prior to Visit   Medication Sig Dispense Refill    buPROPion (WELLBUTRIN XL) 300 MG 24 hr tablet Take 300 mg by mouth once daily.      risankizumab-rzaa (SKYRIZI) 360 mg/2.4 mL (150 mg/mL) Injt Inject 360 mg into the skin every 8 weeks. 2.4 mL 2    hydrocortisone 2.5 % cream Apply topically. (Patient not taking: Reported on 6/16/2025)      ketoconazole (NIZORAL) 2 % cream Apply 1 application  topically 2 (two) times daily. (Patient not taking: Reported on 6/16/2025)      [DISCONTINUED] CYANOCOBALAMIN, VITAMIN B-12, ORAL Take 100 mcg by mouth every other day. 2,000 every other day (Patient not taking: Reported on 6/16/2025)      [DISCONTINUED] ergocalciferol (ERGOCALCIFEROL) 50,000 unit Cap TAKE 1 CAPSULE BY MOUTH ONE TIME PER WEEK (Patient not taking: Reported on 6/16/2025) 4 capsule 5    [DISCONTINUED] predniSONE (DELTASONE) 10 MG tablet Take 1 tablet (10 mg total) by mouth once daily. Dose per Dr. Montgomery's instruction. (Patient not taking: Reported on 6/16/2025) 30 tablet 0    [DISCONTINUED] prenatal 21/iron fu/folic acid (PRENATAL COMPLETE ORAL) Take 1  tablet by mouth once daily. (Patient not taking: Reported on 6/16/2025)       No current facility-administered medications on file prior to visit.

## 2025-06-16 NOTE — TELEPHONE ENCOUNTER
Patient is scheduled for a Colonoscopy/EGD on 8/11/2025 with Dr. ERWIN Montgomery  Referral for procedure from Clinic visit

## 2025-06-16 NOTE — PATIENT INSTRUCTIONS
Continue Skyrizi  Hold off on starting Evinature for now.  Get the colonoscopy done first.  Colonoscopy + EGD (indication: gastroduodenal Crohn's) same day in 7/2025 with Dr Montgomery.  Prep: Miralax + Gatoraid.   Calprotectin in 2-3 weeks- will use [facility]  Keep diary of symptoms including oral ulcers  Labs 9/2025- CBC, CMP, TB, HepB Core Ab, HepB surface Ag, CRP, hepA antibody, iron studies (iron, TIBC, ferritin), B12  HepA #2 vaccine same day as scope in 7/2025- RN visit  Start Vitamin D3 2000 IU/day

## 2025-07-24 ENCOUNTER — RESULTS FOLLOW-UP (OUTPATIENT)
Dept: GASTROENTEROLOGY | Facility: CLINIC | Age: 25
End: 2025-07-24
Payer: COMMERCIAL

## 2025-07-24 NOTE — TELEPHONE ENCOUNTER
Elevated stool calprotectin w/ associated diarrhea, abdominal cramping, & oral ulcers  Called & spoke to pt  Reviewed stool calprotectin higher than 3 months ago   Reports worsening bowel movements starting last week & oral ulcers starting this past weekend   Symptoms:  2-3 liquid- loose/soft BM/ QD w/ worsening urgency  Nocturnal BM 2x/ wk  Mid-upper abdominal cramping 6-7/10 occurring 4-5x in last week & typically lasting 1 hour  Notes blood in the toilet bowl ~1x/d which pt feels may be related to hemorrhoids; stool yuliana sample had some blood mixed in the specimen  Oral ulcers starting this weekend; stable  Current IBD meds:   Skyrizi 360 mg SC every 8 weeks (started 1/7/25, first OBI 4/3/25, LD: 5/29, ND 7/24)   Pt's son had diarrhea/vomiting over the weekend; resolved   Has EGD/colonoscopy scheduled 8/11/25; no sooner availability at this time though pt is open to sooner scope if slot opens  Dr. Montgomery updated

## 2025-07-24 NOTE — PROGRESS NOTES
LM for callback [id number]  - Stool calprotectin higher than 3 months ago & need symptom update  - EGD/ colonoscopy scheduled 8/11/25

## 2025-07-25 NOTE — TELEPHONE ENCOUNTER
LM for callback [id number]  - Sooner scope (slot held 7/28)  - May consider dose escalation of Skyrizi to [id number] pending scope findings  - If unable to come for sooner colonoscopy then plan for 1 week symptom update on 7/31/25

## 2025-08-04 DIAGNOSIS — K50.818 CROHN'S DISEASE OF BOTH SMALL AND LARGE INTESTINE WITH OTHER COMPLICATION: ICD-10-CM

## 2025-08-04 RX ORDER — RISANKIZUMAB-RZAA 360 MG/2.4
WEARABLE INJECTOR SUBCUTANEOUS
Qty: 2.4 ML | Refills: 0 | Status: SHIPPED | OUTPATIENT
Start: 2025-08-04

## 2025-08-04 NOTE — TELEPHONE ENCOUNTER
"Primary provider: Dr. Antonio Montgomery    IBD medications: Skyrizi 360 mg SC every 8 weeks     Refill request for:      Pended Medication(s)   Requested Prescriptions     Pending Prescriptions Disp Refills    SKYRIZI 360 mg/2.4 mL (150 mg/mL) Injt [Pharmacy Med Name: SKYRIZI CARTRIDGE 360MG/2.4M]  2     Sig: INSERT 1 CARTRIDGE INTO ON-BODY INJECTOR AND INJECT 360 MG UNDER THE SKIN EVERY 8 WEEKS           Allergies reviewed: Yes    Drug Monitoring labs/frequency for all IBD meds:    CBC and CMP every 6 months  TB and Hep B every 12 months    Lab Results   Component Value Date    HEPBSAG Non-reactive 09/04/2024    HEPBCAB Non-reactive 09/04/2024     Lab Results   Component Value Date    TBGOLDPLUS Negative 11/18/2024     No results found for: "QUANTNILVALU", "QUANTIFERON", "QUANTTBGDPL"   No results found for: "TSPOTSCREN"  Lab Results   Component Value Date    [id number] 41 03/23/2023    [id number] 731 10/25/2024     Lab Results   Component Value Date    WBC 6.81 03/17/2025    HGB 11.1 (L) 03/17/2025    HCT 37.0 03/17/2025    MCV 82 03/17/2025     03/17/2025     Lab Results   Component Value Date    CREATININE 0.6 03/17/2025    ALBUMIN 3.2 (L) 03/17/2025    BILITOT 0.2 03/17/2025    ALKPHOS 83 03/17/2025    AST 14 03/17/2025    ALT 10 03/17/2025     No results found for: "CHOL", "HDL", "LDLCALC", "TRIG"    Lab due date (mo/yr):  9/2025    Labs scheduled: Yes     Next appt: 8/25/2025    RX refill sent to provider for amount until next labs: Yes       "

## 2025-08-10 PROBLEM — K50.00: Status: ACTIVE | Noted: 2025-08-10

## 2025-08-11 ENCOUNTER — ANESTHESIA (OUTPATIENT)
Dept: ENDOSCOPY | Facility: HOSPITAL | Age: 25
End: 2025-08-11
Payer: COMMERCIAL

## 2025-08-11 ENCOUNTER — HOSPITAL ENCOUNTER (OUTPATIENT)
Facility: HOSPITAL | Age: 25
Discharge: HOME | End: 2025-08-11
Attending: INTERNAL MEDICINE | Admitting: INTERNAL MEDICINE
Payer: COMMERCIAL

## 2025-08-11 ENCOUNTER — ANESTHESIA EVENT (OUTPATIENT)
Dept: ENDOSCOPY | Facility: HOSPITAL | Age: 25
End: 2025-08-11
Payer: COMMERCIAL

## 2025-08-11 VITALS
HEART RATE: 73 BPM | TEMPERATURE: 97 F | RESPIRATION RATE: 16 BRPM | DIASTOLIC BLOOD PRESSURE: 58 MMHG | SYSTOLIC BLOOD PRESSURE: 108 MMHG | OXYGEN SATURATION: 100 %

## 2025-08-11 DIAGNOSIS — K50.00 GASTRODUODENAL CROHN'S DISEASE WITHOUT COMPLICATION: ICD-10-CM

## 2025-08-11 DIAGNOSIS — K50.818 CROHN'S DISEASE OF BOTH SMALL AND LARGE INTESTINE WITH OTHER COMPLICATION: ICD-10-CM

## 2025-08-11 DIAGNOSIS — K50.90 CROHN DISEASE: ICD-10-CM

## 2025-08-11 DIAGNOSIS — K50.818 CROHN'S DISEASE OF BOTH SMALL AND LARGE INTESTINE WITH OTHER COMPLICATION: Primary | ICD-10-CM

## 2025-08-11 DIAGNOSIS — K13.79 RECURRENT ORAL ULCERS: ICD-10-CM

## 2025-08-11 LAB
B-HCG UR QL: NEGATIVE
CTP QC/QA: YES

## 2025-08-11 PROCEDURE — 37000009 HC ANESTHESIA EA ADD 15 MINS: Performed by: INTERNAL MEDICINE

## 2025-08-11 PROCEDURE — 37000008 HC ANESTHESIA 1ST 15 MINUTES: Performed by: INTERNAL MEDICINE

## 2025-08-11 PROCEDURE — 43239 EGD BIOPSY SINGLE/MULTIPLE: CPT | Mod: 51,,, | Performed by: INTERNAL MEDICINE

## 2025-08-11 PROCEDURE — 88342 IMHCHEM/IMCYTCHM 1ST ANTB: CPT | Mod: TC,91 | Performed by: INTERNAL MEDICINE

## 2025-08-11 PROCEDURE — 27201012 HC FORCEPS, HOT/COLD, DISP: Performed by: INTERNAL MEDICINE

## 2025-08-11 PROCEDURE — 43239 EGD BIOPSY SINGLE/MULTIPLE: CPT | Performed by: INTERNAL MEDICINE

## 2025-08-11 PROCEDURE — 63600175 PHARM REV CODE 636 W HCPCS: Performed by: NURSE ANESTHETIST, CERTIFIED REGISTERED

## 2025-08-11 PROCEDURE — 45380 COLONOSCOPY AND BIOPSY: CPT | Performed by: INTERNAL MEDICINE

## 2025-08-11 PROCEDURE — 25000003 PHARM REV CODE 250: Performed by: INTERNAL MEDICINE

## 2025-08-11 PROCEDURE — 81025 URINE PREGNANCY TEST: CPT | Performed by: INTERNAL MEDICINE

## 2025-08-11 PROCEDURE — 45380 COLONOSCOPY AND BIOPSY: CPT | Mod: ,,, | Performed by: INTERNAL MEDICINE

## 2025-08-11 RX ORDER — PROPOFOL 10 MG/ML
VIAL (ML) INTRAVENOUS
Status: DISCONTINUED | OUTPATIENT
Start: 2025-08-11 | End: 2025-08-11

## 2025-08-11 RX ORDER — SODIUM CHLORIDE 9 MG/ML
INJECTION, SOLUTION INTRAVENOUS CONTINUOUS
Status: DISCONTINUED | OUTPATIENT
Start: 2025-08-11 | End: 2025-08-11 | Stop reason: HOSPADM

## 2025-08-11 RX ORDER — LIDOCAINE HYDROCHLORIDE 20 MG/ML
INJECTION, SOLUTION EPIDURAL; INFILTRATION; INTRACAUDAL; PERINEURAL
Status: DISCONTINUED | OUTPATIENT
Start: 2025-08-11 | End: 2025-08-11

## 2025-08-11 RX ADMIN — LIDOCAINE HYDROCHLORIDE 100 MG: 20 INJECTION, SOLUTION EPIDURAL; INFILTRATION; INTRACAUDAL; PERINEURAL at 08:08

## 2025-08-11 RX ADMIN — SODIUM CHLORIDE: 0.9 INJECTION, SOLUTION INTRAVENOUS at 08:08

## 2025-08-11 RX ADMIN — PROPOFOL 200 MCG/KG/MIN: 10 INJECTION, EMULSION INTRAVENOUS at 08:08

## 2025-08-11 RX ADMIN — PROPOFOL 100 MG: 10 INJECTION, EMULSION INTRAVENOUS at 08:08

## 2025-08-13 LAB
ESTROGEN SERPL-MCNC: NORMAL PG/ML
INSULIN SERPL-ACNC: NORMAL U[IU]/ML
LAB AP CLINICAL INFORMATION: NORMAL
LAB AP GROSS DESCRIPTION: NORMAL
LAB AP PERFORMING LOCATION(S): NORMAL
LAB AP REPORT FOOTNOTES: NORMAL
T3RU NFR SERPL: NORMAL %

## 2025-08-25 ENCOUNTER — PATIENT MESSAGE (OUTPATIENT)
Dept: GASTROENTEROLOGY | Facility: CLINIC | Age: 25
End: 2025-08-25

## 2025-08-25 ENCOUNTER — OFFICE VISIT (OUTPATIENT)
Dept: GASTROENTEROLOGY | Facility: CLINIC | Age: 25
End: 2025-08-25
Payer: COMMERCIAL

## 2025-08-25 VITALS
HEART RATE: 92 BPM | TEMPERATURE: 98 F | WEIGHT: 175.69 LBS | DIASTOLIC BLOOD PRESSURE: 77 MMHG | OXYGEN SATURATION: 99 % | BODY MASS INDEX: 27.57 KG/M2 | HEIGHT: 67 IN | SYSTOLIC BLOOD PRESSURE: 116 MMHG

## 2025-08-25 DIAGNOSIS — T45.1X5A IMMUNODEFICIENCY DUE TO LONG TERM IMMUNOSUPPRESSIVE DRUG THERAPY: ICD-10-CM

## 2025-08-25 DIAGNOSIS — Z79.60 IMMUNODEFICIENCY DUE TO LONG TERM IMMUNOSUPPRESSIVE DRUG THERAPY: ICD-10-CM

## 2025-08-25 DIAGNOSIS — D84.821 IMMUNODEFICIENCY DUE TO LONG TERM IMMUNOSUPPRESSIVE DRUG THERAPY: ICD-10-CM

## 2025-08-25 DIAGNOSIS — K50.818 CROHN'S DISEASE OF BOTH SMALL AND LARGE INTESTINE WITH OTHER COMPLICATION: Primary | ICD-10-CM

## 2025-08-25 DIAGNOSIS — K12.1 ORAL ULCER: ICD-10-CM

## 2025-08-25 DIAGNOSIS — R10.11 RIGHT UPPER QUADRANT ABDOMINAL PAIN: ICD-10-CM

## 2025-08-25 DIAGNOSIS — R74.01 ELEVATED ALT MEASUREMENT: ICD-10-CM

## 2025-08-25 PROCEDURE — G2211 COMPLEX E/M VISIT ADD ON: HCPCS | Mod: S$GLB,,, | Performed by: INTERNAL MEDICINE

## 2025-08-25 PROCEDURE — 1159F MED LIST DOCD IN RCRD: CPT | Mod: CPTII,S$GLB,, | Performed by: INTERNAL MEDICINE

## 2025-08-25 PROCEDURE — 99999 PR PBB SHADOW E&M-EST. PATIENT-LVL IV: CPT | Mod: PBBFAC,,, | Performed by: INTERNAL MEDICINE

## 2025-08-25 PROCEDURE — 3078F DIAST BP <80 MM HG: CPT | Mod: CPTII,S$GLB,, | Performed by: INTERNAL MEDICINE

## 2025-08-25 PROCEDURE — 99215 OFFICE O/P EST HI 40 MIN: CPT | Mod: S$GLB,,, | Performed by: INTERNAL MEDICINE

## 2025-08-25 PROCEDURE — 3008F BODY MASS INDEX DOCD: CPT | Mod: CPTII,S$GLB,, | Performed by: INTERNAL MEDICINE

## 2025-08-25 PROCEDURE — 3074F SYST BP LT 130 MM HG: CPT | Mod: CPTII,S$GLB,, | Performed by: INTERNAL MEDICINE

## 2025-08-25 RX ORDER — MESALAMINE 1000 MG/1
1000 SUPPOSITORY RECTAL NIGHTLY
Qty: 30 SUPPOSITORY | Refills: 1 | Status: SHIPPED | OUTPATIENT
Start: 2025-08-25

## 2025-08-26 ENCOUNTER — TELEPHONE (OUTPATIENT)
Dept: GASTROENTEROLOGY | Facility: CLINIC | Age: 25
End: 2025-08-26
Payer: COMMERCIAL

## 2025-08-26 DIAGNOSIS — K50.818 CROHN'S DISEASE OF BOTH SMALL AND LARGE INTESTINE WITH OTHER COMPLICATION: Primary | ICD-10-CM
